# Patient Record
Sex: MALE | Race: WHITE
[De-identification: names, ages, dates, MRNs, and addresses within clinical notes are randomized per-mention and may not be internally consistent; named-entity substitution may affect disease eponyms.]

---

## 2019-02-05 ENCOUNTER — HOSPITAL ENCOUNTER (EMERGENCY)
Dept: HOSPITAL 33 - ED | Age: 74
Discharge: HOME | End: 2019-02-05
Payer: MEDICARE

## 2019-02-05 VITALS — OXYGEN SATURATION: 98 % | SYSTOLIC BLOOD PRESSURE: 132 MMHG | HEART RATE: 67 BPM | DIASTOLIC BLOOD PRESSURE: 67 MMHG

## 2019-02-05 DIAGNOSIS — R55: Primary | ICD-10-CM

## 2019-02-05 DIAGNOSIS — Z79.01: ICD-10-CM

## 2019-02-05 DIAGNOSIS — I25.10: ICD-10-CM

## 2019-02-05 DIAGNOSIS — M79.89: ICD-10-CM

## 2019-02-05 DIAGNOSIS — Z79.899: ICD-10-CM

## 2019-02-05 DIAGNOSIS — E11.9: ICD-10-CM

## 2019-02-05 DIAGNOSIS — F32.9: ICD-10-CM

## 2019-02-05 DIAGNOSIS — I10: ICD-10-CM

## 2019-02-05 DIAGNOSIS — S80.212A: ICD-10-CM
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
## 2019-02-05 DIAGNOSIS — J44.9: ICD-10-CM

## 2019-02-05 LAB
ALBUMIN SERPL-MCNC: 4.3 G/DL (ref 3.5–5)
ALP SERPL-CCNC: 96 U/L (ref 38–126)
ALT SERPL-CCNC: 23 U/L (ref 0–50)
ANION GAP SERPL CALC-SCNC: 15.3 MEQ/L (ref 5–15)
AST SERPL QL: 27 U/L (ref 17–59)
BASOPHILS # BLD AUTO: 0.04 10*3/UL (ref 0–0.4)
BASOPHILS NFR BLD AUTO: 0.5 % (ref 0–0.4)
BILIRUB BLD-MCNC: 0.9 MG/DL (ref 0.2–1.3)
BUN SERPL-MCNC: 12 MG/DL (ref 9–20)
CALCIUM SPEC-MCNC: 9.3 MG/DL (ref 8.4–10.2)
CHLORIDE SERPL-SCNC: 105 MMOL/L (ref 98–107)
CO2 SERPL-SCNC: 26 MMOL/L (ref 22–30)
CREAT SERPL-MCNC: 1.16 MG/DL (ref 0.66–1.25)
EOSINOPHIL # BLD AUTO: 0.33 10*3/UL (ref 0–0.5)
GLUCOSE SERPL-MCNC: 179 MG/DL (ref 74–106)
GRANULOCYTES # BLD AUTO: 4.47 10*3/UL (ref 1.4–6.9)
HCT VFR BLD AUTO: 36.5 % (ref 42–50)
HGB BLD-MCNC: 10.8 GM/DL (ref 12.5–18)
LYMPHOCYTES # SPEC AUTO: 1.64 10*3/UL (ref 1–4.6)
MCH RBC QN AUTO: 24.6 PG (ref 26–32)
MCHC RBC AUTO-ENTMCNC: 29.6 G/DL (ref 32–36)
MONOCYTES # BLD AUTO: 0.86 10*3/UL (ref 0–1.3)
NEUTROPHILS NFR BLD AUTO: 61 % (ref 36–66)
PLATELET # BLD AUTO: 302 K/MM3 (ref 150–450)
POTASSIUM SERPLBLD-SCNC: 3.9 MMOL/L (ref 3.5–5.1)
PROT SERPL-MCNC: 7.3 G/DL (ref 6.3–8.2)
RBC # BLD AUTO: 4.39 M/MM3 (ref 4.1–5.6)
SODIUM SERPL-SCNC: 143 MMOL/L (ref 137–145)
WBC # BLD AUTO: 7.3 K/MM3 (ref 4–10.5)

## 2019-02-05 PROCEDURE — 84484 ASSAY OF TROPONIN QUANT: CPT

## 2019-02-05 PROCEDURE — 80053 COMPREHEN METABOLIC PANEL: CPT

## 2019-02-05 PROCEDURE — 99284 EMERGENCY DEPT VISIT MOD MDM: CPT

## 2019-02-05 PROCEDURE — 96361 HYDRATE IV INFUSION ADD-ON: CPT

## 2019-02-05 PROCEDURE — 96360 HYDRATION IV INFUSION INIT: CPT

## 2019-02-05 PROCEDURE — 85025 COMPLETE CBC W/AUTO DIFF WBC: CPT

## 2019-02-05 PROCEDURE — 93041 RHYTHM ECG TRACING: CPT

## 2019-02-05 PROCEDURE — 36415 COLL VENOUS BLD VENIPUNCTURE: CPT

## 2019-02-05 PROCEDURE — 85379 FIBRIN DEGRADATION QUANT: CPT

## 2019-02-05 PROCEDURE — 93005 ELECTROCARDIOGRAM TRACING: CPT

## 2019-02-05 NOTE — ERPHSYRPT
- History of Present Illness


Time Seen by Provider: 02/05/19 10:25


Source: patient


Exam Limitations: no limitations


Patient Subjective Stated Complaint: pt arrived per ambulance from drs office 

for dizzines after getting up from a chair.he fell to the ground on knees, ems 

had pt walk from cot to bed, and pt denies any dizziness now.


Triage Nursing Assessment: pt alert, resp easy, skin w/d/p. chest clear, slight 

swelling to lower legs. pt has abrasion to left knee from fall. pt denies any 

other injury


Physician History: 





73-year-old white male with a history of syncopal episodes in the past.


Patient arrives with complaint that he was at his doctor's office apparently 

stepped off the scale went to walk into a room felt weak dizzy and felt to his 

hands and knees.


He did not have any problems speaking he states he did not have any loss of 

consciousness he has no chest pain no shortness of breath.


He is not having any focal weaknesses and is moving all of his extremities.  He 

does state that he had a syncopal episode in the distant past where he actually 

needed to be  resuscitated past medical history includes COPD, syncope, high 

blood pressure, diabetes type 2, coronary artery disease, hypertension, 

gallbladder disease, prostate problems, depression





Past surgical history includes left knee surgery, dental extraction, cataracts





Social history patient is a former smoker he denies alcohol or illicit drug use.














Timing/Duration: today


Severity: moderate


Modifying Factors: Improves With: nothing


Associated Symptoms: weakness, other (generalized weakness with near syncopal 

episode), No nausea, No vomiting, No abdominal pain, No shortness of breath, No 

heartburn, No diaphoresis, No cough, No chills, No chest pain, No fever, No 

headaches, No loss of appetite, No malaise, No rash, No syncope, No seizure


Allergies/Adverse Reactions: 








codeine Allergy (Verified 02/05/19 10:21)


 


Sulfa (Sulfonamide Antibiotics) Allergy (Verified 02/05/19 10:21)


 





Home Medications: 








Amlodipine Besylate 5 mg PO DAILY 01/17/16 [History]


Aspirin [Aspir-Low] 81 mg PO DAILY 01/17/16 [History]


Losartan Potassium 100 mg PO DAILY 01/17/16 [History]


Metformin HCl 500 mg*** [Glucophage 500 MG***] 500 mg PO HS 01/17/16 [History]


Metoprolol Succinate 50 mg PO HS 01/17/16 [History]


Multivitamin [Multi-Vitamin Daily] 1 each PO DAILY 01/17/16 [History]


Nabumetone [Relafen] 500 mg PO BID 01/17/16 [History]


Pyridoxine HCl [Vitamin B-6] 100 mg PO DAILY 01/17/16 [History]


Ranitidine HCl 150 mg PO HS 01/17/16 [History]


Senna 8.6 mg*** [Senokot 8.6 mg***] 8.6 mg PO DAILY PRN PRN 01/17/16 [History]


Tamsulosin HCl 0.4 mg*** [Flomax 0.4 MG***] 0.4 mg PO HS 01/17/16 [History]


Vitamin E 400 Units*** [Vitamin E 400 UNIT SOFTGEL***] 400 unit PO DAILY 01/17/ 16 [History]


Omeprazole 40 mg PO DAILY 02/29/16 [History]


Budesonide 0.5 mg/2 ml*** [Pulmicort 0.5 mg/2 ml Respules***] 0.5 ml IH  08/24 /16 [History]


Rivaroxaban [Xarelto] 15 mg PO DAILY 08/24/16 [History]





Hx Tetanus, Diphtheria Vaccination/Date Given: Yes (unknown)


Hx Influenza Vaccination/Date Given: Yes


Hx Pneumococcal Vaccination/Date Given: Yes


Immunizations Up to Date: Yes





- Review of Systems


Constitutional: No Fever, No Chills


Eyes: No Symptoms


Ears, Nose, & Throat: No Symptoms


Respiratory: No Cough, No Dyspnea


Cardiac: Other (generalized weakness with near syncopal episode and fall at 

physician's office), No Chest Pain, No Edema, No Syncope


Abdominal/Gastrointestinal: No Abdominal Pain, No Nausea, No Vomiting, No 

Diarrhea


Genitourinary Symptoms: No Dysuria


Musculoskeletal: Other (Slight pain left anterior knee), No Back Pain, No Neck 

Pain


Skin: Other (abrasion left anterior k)


Neurological: Dizziness, Other (generalized weakness with near syncopal episode)

, No Focal Weakness, No Gait Changes, No Headache, No Irritability, No Lethargy

, No Paralysis, No Parasthesia, No Seizure, No Sensory Changes, No Speech 

Changes, No Tics, No Tremors, No Vertigo


Psychological: No Symptoms


Endocrine: No Symptoms


All Other Systems: Reviewed and Negative





- Past Medical History


Pertinent Past Medical History: Yes


Neurological History: No Pertinent History


ENT History: No Pertinent History


Cardiac History: Coronary Artery Disease, Hypertension, Other


Respiratory History: COPD


Endocrine Medical History: Diabetes Type II


Musculoskeletal History: No Pertinent History


GI Medical History: Gallbladder Disease


 History: No Pertinent History


Psycho-Social History: Depression


Male Reproductive Disorders: Prostate Problems





- Past Surgical History


Past Surgical History: Yes


Neuro Surgical History: No Pertinent History


Cardiac: No Pertinent History


Respiratory: No Pertinent History


Gastrointestinal: Cholecystectomy


Musculoskeletal: Orthopedic Surgery


Male Surgical History: Other


Other Surgical History: dental surgical extractions,left knee feb after fall on 

ice,tear of tendons repaired.





- Social History


Smoking Status: Former smoker


Exposure to second hand smoke: Yes


Alcohol Use: None


Drug Use: none


Patient Lives Alone: No


Significant Family History: diabetes, hypertension





- Nursing Vital Signs


Nursing Vital Signs: 


 Initial Vital Signs











Temperature  97.0 F   02/05/19 10:14


 


Pulse Rate  77   02/05/19 10:14


 


Respiratory Rate  18   02/05/19 10:14


 


Blood Pressure  158/92   02/05/19 10:14


 


O2 Sat by Pulse Oximetry  96   02/05/19 10:14








 Pain Scale











Pain Intensity                 0

















- Physical Exam


General Appearance: no apparent distress, alert


Eye Exam: PERRL/EOMI, eyes nml inspection


Ears, Nose, Throat Exam: normal ENT inspection, TMs normal, pharynx normal, 

moist mucous membranes


Neck Exam: normal inspection, non-tender, supple, full range of motion


Respiratory Exam: normal breath sounds, lungs clear, No respiratory distress


Cardiovascular Exam: regular rate/rhythm, normal heart sounds, normal 

peripheral pulses, capillary refill <2 sec


Gastrointestinal/Abdomen Exam: soft, normal bowel sounds, No tenderness, No mass


Back Exam: normal inspection, normal range of motion, No CVA tenderness, No 

vertebral tenderness


Extremity Exam: normal range of motion, pelvis stable, other (slight edema left 

anterior knee,abrasion left anterior knee), No amputations, No burns, No 

contusions, No calf tenderness, No deformities, No lacerations, No penetrations

, No parasthesia, No paralysis, No shagufta's sign, No inflammation, No joint 

swelling, No limited range of motion


Neurologic Exam: alert, oriented x 3, cooperative, CNs II-XII nml as tested, 

normal mood/affect, nml cerebellar function, nml station & gait, sensation nml, 

other (patient alert, oriented 3, cranial nerves II through XII intact, speech 

normal, no facial droop, normal finger to nose, no pronator drift,  equal 

and symmetrical 5/5, full range of motion all extremities, sensation intact to 

all extremities GCS equals 15), No motor deficits, No sensory deficit, No 

disoriented, No confusion, No agitation, No uncooperative, No intoxicated 

appearance, No depressed mood/affect, No motor weakness, No facial droop, No 

slurred speech, No aphasia, No dysarthria, No abnormal gait, No abnormal 

cerebellar tests, No abnormal CNs II-XII, No EOM palsy


Skin Exam: other (abrasion  left anterior knee)


Lymphatic Exam: adenopathy


**SpO2 Interpretation**: normal (96%)


SpO2: 96





- Course


Nursing assessment & vital signs reviewed: Yes


EKG Interpreted by Me: RATE (65 bpm), Sinus Rhythm, NORMAL AXIS, Other (EKG: 

Sinus rhythm with PVC, 73 bpm, normal axis, complete right bundle block., No 

acute ST or T wave changes noted. Compared to February 29, 2016)


Ordered Tests: 


 Active Orders 24 hr











 Category Date Time Status


 


 Cardiac Monitor STAT Care  02/05/19 10:33 Active


 


 EKG-ER Only STAT Care  02/05/19 10:32 Active


 


 IV Insertion STAT Care  02/05/19 10:32 Active


 


 Orthostatic Vital Signs STAT Care  02/05/19 10:33 Active


 


 Pulse Oximetry (ED) STAT Care  02/05/19 10:32 Active


 


 Wound Care STAT Care  02/05/19 10:33 Active


 


 CBC W DIFF Stat Lab  02/05/19 11:00 Completed


 


 CMP Stat Lab  02/05/19 11:00 Completed


 


 D-DIMER QUANTITATION Stat Lab  02/05/19 11:00 Completed


 


 TROPONIN Q3H Lab  02/05/19 11:00 Completed


 


 TROPONIN Q3H Lab  02/05/19 13:50 Completed


 


 TROPONIN Q3H Lab  02/05/19 16:45 Ordered


 


 TROPONIN Q3H Lab  02/05/19 19:45 Ordered


 


 TROPONIN Q3H Lab  02/05/19 22:45 Ordered








Medication Summary











Generic Name Dose Route Start Last Admin





  Trade Name Freq  PRN Reason Stop Dose Admin


 


Sodium Chloride  1,000 mls @ 100 mls/hr  02/05/19 10:45  02/05/19 10:42





  Sodium Chloride 0.9% 1000 Ml  IV  03/07/19 10:44  100 mls/hr





  .Q10H JOYCELYN   Administration





     





     





     





     














Discontinued Medications














Generic Name Dose Route Start Last Admin





  Trade Name Cyndi  PRN Reason Stop Dose Admin


 


Bacitracin Zinc  0.9 gm  02/05/19 10:33  02/05/19 10:42





  Baciguent Packet***  TP  02/05/19 10:34  1 gm





  STAT ONE   Administration





     





     





     





     


 


Bacitracin Zinc  Confirm  02/05/19 10:40  





  Baciguent Packet***  Administered  02/05/19 10:41  





  Dose   





  1 gm   





  .ROUTE   





  .[place]   





     





     





     





     











Lab/Rad Data: 


 Laboratory Result Diagrams





 02/05/19 11:00 





 02/05/19 11:00 





 Laboratory Results











  02/05/19 02/05/19 02/05/19 Range/Units





  13:50 11:00 11:00 


 


WBC     (4.0-10.5)  K/mm3


 


RBC     (4.1-5.6)  M/mm3


 


Hgb     (12.5-18.0)  gm/dl


 


Hct     (42-50)  %


 


MCV     ()  fl


 


MCH     (26-32)  pg


 


MCHC     (32-36)  g/dl


 


RDW     (11.5-14.0)  %


 


Plt Count     (150-450)  K/mm3


 


MPV     (6-9.5)  fl


 


Gran %     (36.0-66.0)  %


 


Eos # (Auto)     (0-0.5)  


 


Absolute Lymphs (auto)     (1.0-4.6)  


 


Absolute Monos (auto)     (0.0-1.3)  


 


Lymphocytes %     (24.0-44.0)  %


 


Monocytes %     (0.0-12.0)  %


 


Eosinophils %     (0.00-5.0)  %


 


Basophils %     (0.0-0.4)  %


 


Absolute Granulocytes     (1.4-6.9)  


 


Basophils #     (0-0.4)  


 


D-Dimer    392  (215-500)  ng/mL


 


Sodium     (137-145)  mmol/L


 


Potassium     (3.5-5.1)  mmol/L


 


Chloride     ()  mmol/L


 


Carbon Dioxide     (22-30)  mmol/L


 


Anion Gap     (5-15)  MEQ/L


 


BUN     (9-20)  mg/dL


 


Creatinine     (0.66-1.25)  mg/dL


 


Estimated GFR     ML/MIN


 


Glucose     ()  mg/dL


 


Calcium     (8.4-10.2)  mg/dL


 


Total Bilirubin     (0.2-1.3)  mg/dL


 


AST     (17-59)  U/L


 


ALT     (0-50)  U/L


 


Alkaline Phosphatase     ()  U/L


 


Troponin I  < 0.012  < 0.012   (0.000-0.034)  ng/mL


 


Serum Total Protein     (6.3-8.2)  g/dL


 


Albumin     (3.5-5.0)  g/dL














  02/05/19 02/05/19 Range/Units





  11:00 11:00 


 


WBC   7.3  (4.0-10.5)  K/mm3


 


RBC   4.39  (4.1-5.6)  M/mm3


 


Hgb   10.8 L  (12.5-18.0)  gm/dl


 


Hct   36.5 L  (42-50)  %


 


MCV   83.1  ()  fl


 


MCH   24.6 L  (26-32)  pg


 


MCHC   29.6 L  (32-36)  g/dl


 


RDW   16.2 H  (11.5-14.0)  %


 


Plt Count   302  (150-450)  K/mm3


 


MPV   11.6 H  (6-9.5)  fl


 


Gran %   61.0  (36.0-66.0)  %


 


Eos # (Auto)   0.33  (0-0.5)  


 


Absolute Lymphs (auto)   1.64  (1.0-4.6)  


 


Absolute Monos (auto)   0.86  (0.0-1.3)  


 


Lymphocytes %   22.3 L  (24.0-44.0)  %


 


Monocytes %   11.7  (0.0-12.0)  %


 


Eosinophils %   4.5  (0.00-5.0)  %


 


Basophils %   0.5  (0.0-0.4)  %


 


Absolute Granulocytes   4.47  (1.4-6.9)  


 


Basophils #   0.04  (0-0.4)  


 


D-Dimer    (215-500)  ng/mL


 


Sodium  143   (137-145)  mmol/L


 


Potassium  3.9   (3.5-5.1)  mmol/L


 


Chloride  105   ()  mmol/L


 


Carbon Dioxide  26   (22-30)  mmol/L


 


Anion Gap  15.3 H   (5-15)  MEQ/L


 


BUN  12   (9-20)  mg/dL


 


Creatinine  1.16   (0.66-1.25)  mg/dL


 


Estimated GFR  > 60.0   ML/MIN


 


Glucose  179 H   ()  mg/dL


 


Calcium  9.3   (8.4-10.2)  mg/dL


 


Total Bilirubin  0.90   (0.2-1.3)  mg/dL


 


AST  27   (17-59)  U/L


 


ALT  23   (0-50)  U/L


 


Alkaline Phosphatase  96   ()  U/L


 


Troponin I    (0.000-0.034)  ng/mL


 


Serum Total Protein  7.3   (6.3-8.2)  g/dL


 


Albumin  4.3   (3.5-5.0)  g/dL














- Progress


Progress: improved


Progress Note: 





02/05/19 12:39


73-year-old white male arrives with complaint of a near syncopal episode at his 

physician's office today.


Patient apparently had stepped off the scale of walked into the other room and 

then felt "numb all over".


States he fell to his knees did not pass out did not hit his head no chest pain 

no shortness of breath.


Patient to with the abrasion to the left anterior knee.


Patient in no acute distress vitals are stable.


CBC CMP troponin and d-dimer are all normal EKG remarkable for sinus rhythm 

with PVC right bundle branch block no acute ST or T wave changes are noted


Orthostatic vital signs are stable.


I've discussed the patient's case with Dr. Chapman.


He would like to repeat troponins 3 hours after last draw.. Consider discharge 

if within normal limits however patient is to follow-up with Dr. Chapman 

tomorrow in the [place] office at 10:30 AM.


02/05/19 15:03


Patient's repeat troponin within normal limits.


Patient's vitals have been stable.


Patient in no distress.


Will discharge.





- Departure


Time of Disposition: 15:04


Departure Disposition: Home


Clinical Impression: 


 near syncopal episode, Abrasion, left knee, initial encounter





Condition: Fair


Critical Care Time: No


Referrals: 


RAJANI CHAPMAN MD [Primary Care Provider] - 


Additional Instructions: 


Return home rest.


No driving, no heights no hazardous activity.


Take showers instead of baths.


Do not engage in any activity that might harming yourself or others.


Follow-up with Dr. Chapman tomorrow morning at 10:30 AM.([place] office)


Return for acute distress severe symptoms or for any problems.

## 2020-06-15 ENCOUNTER — HOSPITAL ENCOUNTER (EMERGENCY)
Dept: HOSPITAL 33 - ED | Age: 75
Discharge: HOME | End: 2020-06-15
Payer: MEDICARE

## 2020-06-15 VITALS — SYSTOLIC BLOOD PRESSURE: 218 MMHG | DIASTOLIC BLOOD PRESSURE: 74 MMHG | HEART RATE: 60 BPM | OXYGEN SATURATION: 97 %

## 2020-06-15 DIAGNOSIS — J44.9: ICD-10-CM
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
## 2020-06-15 DIAGNOSIS — S61.211A: Primary | ICD-10-CM

## 2020-06-15 DIAGNOSIS — E11.9: ICD-10-CM

## 2020-06-15 DIAGNOSIS — I25.10: ICD-10-CM

## 2020-06-15 DIAGNOSIS — Z79.899: ICD-10-CM
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
## 2020-06-15 DIAGNOSIS — Z79.82: ICD-10-CM

## 2020-06-15 DIAGNOSIS — Z79.01: ICD-10-CM

## 2020-06-15 DIAGNOSIS — I10: ICD-10-CM

## 2020-06-15 PROCEDURE — 99284 EMERGENCY DEPT VISIT MOD MDM: CPT

## 2020-06-15 PROCEDURE — 12001 RPR S/N/AX/GEN/TRNK 2.5CM/<: CPT

## 2020-06-15 PROCEDURE — 96372 THER/PROPH/DIAG INJ SC/IM: CPT

## 2020-06-15 PROCEDURE — 90471 IMMUNIZATION ADMIN: CPT

## 2020-06-15 PROCEDURE — 90715 TDAP VACCINE 7 YRS/> IM: CPT

## 2020-06-15 NOTE — ERPHSYRPT
- History of Present Illness


Time Seen by Provider: 06/15/20 10:40


Source: patient


Exam Limitations: no limitations


Patient Subjective Stated Complaint: pt to ER with complaints of L 2nd digit 

laceration this morning from  knife. pt states he is on blood thinners.


Triage Nursing Assessment: pt with laceration to L 2nd digit. bleeding 

controlled. pt on blood thinners. pt A&Ox4.


Physician History: 





Is a 74-year-old male who was trying to on block a obstruction from in a 

icemaker with a  knife when it slipped and cut his right index finger.  

He is on Eliquis.  The laceration is at the distal phalanx radial aspect 2 cm 

in length.  No other injury this occurred just prior to arrival by private 

vehicle.


Timing/Duration: today


Quality: painful


Severity: moderate


Location: hands (Next finger)


Associated Symptoms: denies symptoms


Allergies/Adverse Reactions: 








codeine Allergy (Verified 06/15/20 10:34)


 


Sulfa (Sulfonamide Antibiotics) Allergy (Verified 06/15/20 10:34)


 





Home Medications: 








Amlodipine Besylate 5 mg PO DAILY 01/17/16 [History]


Aspirin [Aspir-Low] 81 mg PO DAILY 01/17/16 [History]


Losartan Potassium 100 mg PO DAILY 01/17/16 [History]


Metformin HCl 500 mg*** [Glucophage 500 MG***] 500 mg PO HS 01/17/16 [History]


Metoprolol Succinate 50 mg PO HS 01/17/16 [History]


Multivitamin [Multi-Vitamin Daily] 1 each PO DAILY 01/17/16 [History]


Nabumetone [Relafen] 500 mg PO BID 01/17/16 [History]


Pyridoxine HCl [Vitamin B-6] 100 mg PO DAILY 01/17/16 [History]


Ranitidine HCl 150 mg PO HS 01/17/16 [History]


Senna 8.6 mg*** [Senokot 8.6 mg***] 8.6 mg PO DAILY PRN PRN 01/17/16 [History]


Tamsulosin HCl 0.4 mg*** [Flomax 0.4 MG***] 0.4 mg PO HS 01/17/16 [History]


Vitamin E 400 Units*** [Vitamin E 400 UNIT SOFTGEL***] 400 unit PO DAILY 01/17/ 16 [History]


Omeprazole 40 mg PO DAILY 02/29/16 [History]


Budesonide 0.5 mg/2 ml*** [Pulmicort 0.5 mg/2 ml Respules***] 0.5 ml IH UD 08/24 /16 [History]


Rivaroxaban [Xarelto] 15 mg PO DAILY 08/24/16 [History]





Hx Tetanus, Diphtheria Vaccination/Date Given: No


Hx Influenza Vaccination/Date Given: Yes


Hx Pneumococcal Vaccination/Date Given: Yes


Immunizations Up to Date: Yes





Travel Risk





- International Travel


Have you traveled outside of the country in past 3 weeks: No (N)


If Yes, where;: N





- Coronavirus Screening


Close contact with a COVID-19 positive Pt in past 14-21 Days: No





- Review of Systems


Constitutional: No Fever, No Chills


Eyes: No Symptoms


Ears, Nose, & Throat: No Symptoms


Respiratory: No Cough, No Dyspnea


Cardiac: No Chest Pain, No Edema, No Syncope


Abdominal/Gastrointestinal: No Abdominal Pain, No Nausea, No Vomiting, No 

Diarrhea


Genitourinary Symptoms: No Dysuria


Musculoskeletal: No Back Pain, No Neck Pain


Skin: No Rash


Neurological: No Dizziness, No Focal Weakness, No Sensory Changes


Psychological: No Symptoms


Endocrine: No Symptoms


All Other Systems: Reviewed and Negative





- Past Medical History


Pertinent Past Medical History: Yes


Neurological History: No Pertinent History


ENT History: No Pertinent History


Cardiac History: Coronary Artery Disease, Hypertension, Other


Respiratory History: COPD


Endocrine Medical History: Diabetes Type II


Musculoskeletal History: No Pertinent History


GI Medical History: Gallbladder Disease


 History: No Pertinent History


Psycho-Social History: Depression


Male Reproductive Disorders: Prostate Problems





- Past Surgical History


Past Surgical History: Yes


Neuro Surgical History: No Pertinent History


Cardiac: No Pertinent History


Respiratory: No Pertinent History


Gastrointestinal: Cholecystectomy


Musculoskeletal: Orthopedic Surgery


Male Surgical History: Other


Other Surgical History: dental surgical extractions,left knee feb after fall on 

ice,tear of tendons repaired.





- Social History


Smoking Status: Former smoker


Exposure to second hand smoke: No


Alcohol Use: None


Drug Use: none


Patient Lives Alone: No


Significant Family History: diabetes, hypertension





- Nursing Vital Signs


Nursing Vital Signs: 





 Initial Vital Signs











Temperature  97.4 F   06/15/20 10:26


 


Pulse Rate  68   06/15/20 10:26


 


Respiratory Rate  16   06/15/20 10:26


 


Blood Pressure  150/81   06/15/20 10:26


 


O2 Sat by Pulse Oximetry  98   06/15/20 10:26








 Pain Scale











Pain Intensity                 2

















- Physical Exam


General Appearance: no apparent distress, alert


Eye Exam: PERRL/EOMI, eyes nml inspection


Ears, Nose, Throat Exam: normal ENT inspection, pharynx normal, moist mucous 

membranes


Neck Exam: normal inspection, non-tender, supple, full range of motion


Extremity Exam: lacerations, limited range of motion, other (Extremity 

examination shows normal inspection normal range of motion normal neurovascular 

tendon function the only abnormality is a 2 cm laceration distal phalanx left 

index finger radial aspect.)


SpO2: 98


Ordered Tests: 





Medication Summary














Discontinued Medications














Generic Name Dose Route Start Last Admin





  Trade Name Freq  PRN Reason Stop Dose Admin


 


Diphtheria/Tetanus/Acell Pertussis  0.5 ml  06/15/20 10:29  





  Adacel Vial***  IM  06/15/20 10:30  





  .ONCE ONE   





     





     





     





     


 


Diphtheria/Tetanus/Acell Pertussis  Confirm  06/15/20 10:45  





  Adacel Vial***  Administered  06/15/20 10:46  





  Dose   





  0.5 ml   





  IM   





  .STK-MED ONE   





     





     





     





     


 


Lidocaine HCl  5 ml  06/15/20 10:29  





  Xylocaine 1% Hcl 20 Ml Mdv***  IJ  06/15/20 10:30  





  STAT ONE   





     





     





     





     


 


Lidocaine HCl  Confirm  06/15/20 10:32  





  Xylocaine 1% Hcl 20 Ml Mdv***  Administered  06/15/20 10:33  





  Dose   





  5 ml   





  .ROUTE   





  .STK-MED ONE   





     





     





     





     














- Progress


Progress: improved





- Departure


Departure Disposition: Home


Clinical Impression: 


 Laceration





Condition: Stable


Critical Care Time: No


Referrals: 


RAJANI CHAPMAN MD [Primary Care Provider] - 


Instructions:  Laceration Repair With Stitches (DC)

## 2020-08-23 ENCOUNTER — HOSPITAL ENCOUNTER (EMERGENCY)
Dept: HOSPITAL 33 - ED | Age: 75
Discharge: HOME | End: 2020-08-23
Payer: MEDICARE

## 2020-08-23 VITALS — OXYGEN SATURATION: 98 %

## 2020-08-23 VITALS — HEART RATE: 72 BPM | DIASTOLIC BLOOD PRESSURE: 76 MMHG | SYSTOLIC BLOOD PRESSURE: 130 MMHG

## 2020-08-23 DIAGNOSIS — S00.412A: ICD-10-CM

## 2020-08-23 DIAGNOSIS — S40.012A: ICD-10-CM

## 2020-08-23 DIAGNOSIS — S00.432A: ICD-10-CM

## 2020-08-23 DIAGNOSIS — Z79.899: ICD-10-CM

## 2020-08-23 DIAGNOSIS — S09.90XA: Primary | ICD-10-CM

## 2020-08-23 DIAGNOSIS — I25.10: ICD-10-CM

## 2020-08-23 DIAGNOSIS — R55: ICD-10-CM

## 2020-08-23 DIAGNOSIS — J44.9: ICD-10-CM

## 2020-08-23 DIAGNOSIS — I10: ICD-10-CM

## 2020-08-23 DIAGNOSIS — E11.9: ICD-10-CM

## 2020-08-23 LAB
ALBUMIN SERPL-MCNC: 4.4 G/DL (ref 3.5–5)
ALP SERPL-CCNC: 61 U/L (ref 38–126)
ALT SERPL-CCNC: 20 U/L (ref 0–50)
ANION GAP SERPL CALC-SCNC: 13.9 MEQ/L (ref 5–15)
AST SERPL QL: 27 U/L (ref 17–59)
BASOPHILS # BLD AUTO: 0.03 10*3/UL (ref 0–0.4)
BASOPHILS NFR BLD AUTO: 0.4 % (ref 0–0.4)
BILIRUB BLD-MCNC: 1.5 MG/DL (ref 0.2–1.3)
BUN SERPL-MCNC: 15 MG/DL (ref 9–20)
CALCIUM SPEC-MCNC: 9.1 MG/DL (ref 8.4–10.2)
CHLORIDE SERPL-SCNC: 104 MMOL/L (ref 98–107)
CO2 SERPL-SCNC: 26 MMOL/L (ref 22–30)
CREAT SERPL-MCNC: 1.3 MG/DL (ref 0.66–1.25)
EOSINOPHIL # BLD AUTO: 0.2 10*3/UL (ref 0–0.5)
GLUCOSE SERPL-MCNC: 185 MG/DL (ref 74–106)
GLUCOSE UR-MCNC: >=500 MG/DL
HCT VFR BLD AUTO: 40.1 % (ref 42–50)
HGB BLD-MCNC: 12.1 GM/DL (ref 12.5–18)
LIPASE SERPL-CCNC: 144 U/L (ref 23–300)
LYMPHOCYTES # SPEC AUTO: 1.1 10*3/UL (ref 1–4.6)
MCH RBC QN AUTO: 28.5 PG (ref 26–32)
MCHC RBC AUTO-ENTMCNC: 30.2 G/DL (ref 32–36)
MONOCYTES # BLD AUTO: 0.73 10*3/UL (ref 0–1.3)
PLATELET # BLD AUTO: 235 K/MM3 (ref 150–450)
POTASSIUM SERPLBLD-SCNC: 4 MMOL/L (ref 3.5–5.1)
PROT SERPL-MCNC: 7 G/DL (ref 6.3–8.2)
PROT UR STRIP-MCNC: NEGATIVE MG/DL
RBC # BLD AUTO: 4.25 M/MM3 (ref 4.1–5.6)
RBC #/AREA URNS HPF: (no result) /HPF (ref 0–2)
SODIUM SERPL-SCNC: 140 MMOL/L (ref 137–145)
WBC # BLD AUTO: 7 K/MM3 (ref 4–10.5)
WBC #/AREA URNS HPF: (no result) /HPF (ref 0–5)

## 2020-08-23 PROCEDURE — 85025 COMPLETE CBC W/AUTO DIFF WBC: CPT

## 2020-08-23 PROCEDURE — 80076 HEPATIC FUNCTION PANEL: CPT

## 2020-08-23 PROCEDURE — 81001 URINALYSIS AUTO W/SCOPE: CPT

## 2020-08-23 PROCEDURE — 84484 ASSAY OF TROPONIN QUANT: CPT

## 2020-08-23 PROCEDURE — 83690 ASSAY OF LIPASE: CPT

## 2020-08-23 PROCEDURE — 36000 PLACE NEEDLE IN VEIN: CPT

## 2020-08-23 PROCEDURE — 70450 CT HEAD/BRAIN W/O DYE: CPT

## 2020-08-23 PROCEDURE — 36415 COLL VENOUS BLD VENIPUNCTURE: CPT

## 2020-08-23 PROCEDURE — 93005 ELECTROCARDIOGRAM TRACING: CPT

## 2020-08-23 PROCEDURE — 99284 EMERGENCY DEPT VISIT MOD MDM: CPT

## 2020-08-23 PROCEDURE — 80048 BASIC METABOLIC PNL TOTAL CA: CPT

## 2020-08-23 PROCEDURE — 73030 X-RAY EXAM OF SHOULDER: CPT

## 2020-08-23 NOTE — XRAY
Indication: Headache following fall.  Current blood thinner therapy.



Multiple contiguous axial images obtained through the head without contrast.



Comparison: None



Age-appropriate global atrophy and minimal periventricular degenerative

micro-ischemia bilaterally.  No acute intracranial hemorrhage, abnormal

extra-axial fluid collection, or mass effect.  Fourth ventricle is midline.

Bony calvarium intact.  Remaining visualized paranasal sinuses and mastoid air

cells are clear.



Impression: Nonacute senile brain.  Minimal paranasal sinus disease.



Comment: Preliminary interpretation was made by [facility].  No critical discrepancy.

## 2020-08-23 NOTE — XRAY
Pain following fall.



Comparison: None



3 view left shoulder demonstrates mild osteopenia and moderate degenerative

arthropathy of the AC and glenohumeral joints.  Incompletely visualized left

pacemaker and small left hilar calcified nodes.  Remaining shoulder

unremarkable.

## 2020-08-23 NOTE — ERPHSYRPT
- History of Present Illness


Time Seen by Provider: 08/23/20 11:21


Historian: patient


Exam Limitations: no limitations


Physician History: 





The patient is a 74-year-old male with a past medical history significant for 

CAD, atrial fibrillation who currently takes apixaban for anticoagulation 

presents with a chief complaint of a headache and persistent nausea in addition 

to a left shoulder injury that started last Tuesday after he had a syncopal 

episode.


The patient reportedly was working outside and moving a swing set when he 

suddenly felt dizzy and felt as if he was going to pass out before losing 

consciousness and impacting the ground.


Patient states that he lost control of his bowels during the syncopal episode 

but as soon as he hit the ground he immediately awoke and got up and continue to

work.


He denies chest pain, shortness of breath but reportedly over the ongoing week 

he is continued to feel nauseous in addition to having a dull headache that is 

diffuse nonradiating and constant.


He endorsed obtaining some bruising and a superficial abrasion to the left ear 

and a abrasion to the left shoulder as a result of falling.


He denies vomiting, fever, chills, chest pain, palpitations, shortness of 

breath, neck pain and has been able to walk without difficulty since that time.


The reason he comes to the emergency department today was due to his daughter 

concerned of his ongoing headache and nausea with a recently reported head 

injury in the context of taking apixaban and was concerned that he might have 

some "bleeding" in his brain.


Has not taken anything for his symptoms prior to arrival.


He was offered Tylenol for his headache in addition to antiemetics but declined.


Allergies/Adverse Reactions: 








Sulfa (Sulfonamide Antibiotics) Allergy (Severe, Verified 08/23/20 11:30)


   


   hallucinations 


codeine Allergy (Intermediate, Verified 08/23/20 11:30)


   Nausea and Vomiting





Home Medications: 








Aspirin [Aspir-Low] 81 mg PO DAILY 01/17/16 [History]


Losartan Potassium 100 mg PO DAILY 01/17/16 [History]


Metformin HCl 500 mg*** [Glucophage 500 MG***] 500 mg PO HS 01/17/16 [History]


Metoprolol Succinate 50 mg PO HS 01/17/16 [History]


Multivitamin [Multi-Vitamin Daily] 1 each PO DAILY 01/17/16 [History]


Nabumetone [Relafen] 500 mg PO BID 01/17/16 [History]


Pyridoxine HCl [Vitamin B-6] 100 mg PO DAILY 01/17/16 [History]


Senna 8.6 mg*** [Senokot 8.6 mg***] 8.6 mg PO DAILY PRN PRN 01/17/16 [History]


Tamsulosin HCl 0.4 mg*** [Flomax 0.4 MG***] 0.4 mg PO HS 01/17/16 [History]


Vitamin E 400 Units*** [Vitamin E 400 UNIT SOFTGEL***] 400 unit PO DAILY 

01/17/16 [History]


Omeprazole 40 mg PO DAILY 02/29/16 [History]


Budesonide 0.5 mg/2 ml*** [Pulmicort 0.5 mg/2 ml Respules***] 0.5 ml IH UD 

08/24/16 [History]


Apixaban [Eliquis] 5 mg PO BID 06/26/20 [History]


Famotidine 20 mg*** [Pepcid 20 MG***] 20 mg PO DAILY 07/03/20 [History]





Hx Tetanus, Diphtheria Vaccination/Date Given: No


Hx Influenza Vaccination/Date Given: Yes


Hx Pneumococcal Vaccination/Date Given: Yes





- Review of Systems


Constitutional: No Symptoms


Eyes: No Symptoms


Ears, Nose, & Throat: Other (Abrasion and contusion to left ear)


Respiratory: No Symptoms


Cardiac: Syncope


Abdominal/Gastrointestinal: Nausea, No Abdominal Pain, No Vomiting, No Diarrhea,

 No Constipation


Genitourinary Symptoms: No Symptoms


Musculoskeletal: Fall, Other (Left shoulder pain)


Neurological: Headache, No Focal Weakness, No Gait Changes, No Seizure, No 

Sensory Changes, No Speech Changes, No Vertigo


Psychological: No Symptoms


Endocrine: No Symptoms


Hematologic/Lymphatic: Anemia


All Other Systems: Reviewed and Negative





- Past Medical History


Pertinent Past Medical History: Yes


Neurological History: No Pertinent History


ENT History: No Pertinent History


Cardiac History: Arrhythmia, Coronary Artery Disease, Hypertension, Other


Respiratory History: COPD


Endocrine Medical History: Diabetes Type II


Musculoskeletal History: No Pertinent History


GI Medical History: Gallbladder Disease


 History: No Pertinent History


Psycho-Social History: Depression


Male Reproductive Disorders: Prostate Problems


Other Medical History: a fib, pacer/defib





- Past Surgical History


Past Surgical History: Yes


Neuro Surgical History: No Pertinent History


Cardiac: No Pertinent History


Respiratory: No Pertinent History


Gastrointestinal: Cholecystectomy


Genitourinary: No Pertinent History


Musculoskeletal: Orthopedic Surgery


Male Surgical History: Other


Other Surgical History: dental surgical extractions,left knee feb after fall on 

ice,tear of tendons repaired.





- Social History


Smoking Status: Former smoker


How long have you smoked: 42 years


Exposure to second hand smoke: No


Alcohol Use: None


Drug Use: none


Patient Lives Alone: No


Significant Family History: diabetes, hypertension





- Nursing Vital Signs


Nursing Vital Signs: 


                               Initial Vital Signs











Temperature  98.1 F   08/23/20 11:19


 


Pulse Rate  69   08/23/20 11:19


 


Respiratory Rate  18   08/23/20 11:19


 


O2 Sat by Pulse Oximetry  98   08/23/20 11:19








                                   Pain Scale











Pain Intensity                 0

















- Physical Exam


General Appearance: no apparent distress, alert, obese


Eye Exam: PERRL/EOMI, eyes nml inspection, No scleral icterus, No pale 

conjunctivae, No photophobia, No EOM palsy/anisocoria


Ears, Nose, Throat Exam: normal ENT inspection, TMs normal, pharynx normal, 

moist mucous membranes, pharyngeal erythema, other (Superficial abrasion and 

contusion noted to the left external ear that appeared to be healing with no 

evidence of cauliflower ear)


Neck Exam: normal inspection, non-tender, supple, other (No midline cervical 

spine tenderness, crepitus, or step-off)


Respiratory Exam: normal breath sounds, lungs clear, airway intact, No chest 

tenderness, No respiratory distress, No accessory muscle use


Cardiovascular Exam: regular rate/rhythm, normal peripheral pulses, capillary 

refill <2 sec, other (Pacemaker noted to the anterior left chest wall), No 

murmur, No friction rub, No gallop


Gastrointestinal/Abdomen Exam: soft, No tenderness, No mass, No guarding


Rectal Exam: deferred


Back Exam: normal inspection, No CVA tenderness, No vertebral tenderness, No 

point tenderness


Extremity Exam: tenderness, other (Contusion and tenderness noted to the left 

shoulder), No deformities


Neurologic Exam: alert, oriented x 3, cooperative, other (GCS 15)


Skin Exam: warm, dry, other (Contusion/ecchymosis noted to the left shouled), No

 petechiae, No jaundice





- Course


Nursing assessment & vital signs reviewed: Yes


EKG Interpreted by Me: RATE, Other (Ventricular-paced complexes, Vent rate 64 

bpm, WV interval 209 ms, QRS duration 152 ms, QT/QTc 505/521 ms)





- Radiology Exams


  ** Shoulder


X-ray Interpretation: Interpreted by me, Reviewed by me, Negative





- CT Exams


  ** Head


CT Interpretation: Negative, Tele-radiologist Report, Other (No acute 

intracranial process)


Ordered Tests: 


                               Active Orders 24 hr











 Category Date Time Status


 


 EKG-ER Only STAT Care  08/23/20 11:21 Completed


 


 IV Insertion STAT Care  08/23/20 11:21 Completed


 


 HEAD WITHOUT CONTRAST [CT] Stat Exams  08/23/20 12:28 Completed


 


 SHOULDER Stat Exams  08/23/20 12:44 Completed


 


 BMP Stat Lab  08/23/20 12:00 Completed


 


 CBC W DIFF Stat Lab  08/23/20 12:00 Completed


 


 Hepatic Function Panel Stat Lab  08/23/20 12:00 Completed


 


 LIPASE Stat Lab  08/23/20 12:00 Completed


 


 TROPONIN Q3H Lab  08/23/20 12:00 Completed


 


 UA W/RFX UR CULTURE Stat Lab  08/23/20 13:36 Completed











Lab/Rad Data: 


                           Laboratory Result Diagrams





                                 08/23/20 12:00 





                                 08/23/20 12:00 





                               Laboratory Results











  08/23/20 08/23/20 08/23/20 Range/Units





  13:36 12:00 12:00 


 


WBC     (4.0-10.5)  K/mm3


 


RBC     (4.1-5.6)  M/mm3


 


Hgb     (12.5-18.0)  gm/dl


 


Hct     (42-50)  %


 


MCV     ()  fl


 


MCH     (26-32)  pg


 


MCHC     (32-36)  g/dl


 


RDW     (11.5-14.0)  %


 


Plt Count     (150-450)  K/mm3


 


MPV     (7.5-11.0)  fl


 


Gran %     (36.0-66.0)  %


 


Eos # (Auto)     (0-0.5)  


 


Absolute Lymphs (auto)     (1.0-4.6)  


 


Absolute Monos (auto)     (0.0-1.3)  


 


Lymphocytes %     (24.0-44.0)  %


 


Monocytes %     (0.0-12.0)  %


 


Eosinophils %     (0.00-5.0)  %


 


Basophils %     (0.0-0.4)  %


 


Absolute Granulocytes     (1.4-6.9)  


 


Basophils #     (0-0.4)  


 


Sodium    140  (137-145)  mmol/L


 


Potassium    4.0  (3.5-5.1)  mmol/L


 


Chloride    104  ()  mmol/L


 


Carbon Dioxide    26  (22-30)  mmol/L


 


Anion Gap    13.9  (5-15)  MEQ/L


 


BUN    15  (9-20)  mg/dL


 


Creatinine    1.30 H  (0.66-1.25)  mg/dL


 


Estimated GFR    57.4  ML/MIN


 


Glucose    185 H  ()  mg/dL


 


Calcium    9.1  (8.4-10.2)  mg/dL


 


Total Bilirubin    1.50 H  (0.2-1.3)  mg/dL


 


Direct Bilirubin    0.1  (0.0-0.4)  mg/dL


 


AST    27  (17-59)  U/L


 


ALT    20  (0-50)  U/L


 


Alkaline Phosphatase    61  ()  U/L


 


Troponin I   < 0.012   (0.000-0.034)  ng/mL


 


Serum Total Protein    7.0  (6.3-8.2)  g/dL


 


Albumin    4.4  (3.5-5.0)  g/dL


 


Lipase    144  ()  U/L


 


Urine Color  [place]    (YELLOW)  


 


Urine Appearance  SLIGHTLY CLOUDY    (CLEAR)  


 


Urine pH  6.0    (5-6)  


 


Ur Specific Gravity  1.013    (1.005-1.025)  


 


Urine Protein  NEGATIVE    (Negative)  


 


Urine Ketones  NEGATIVE    (NEGATIVE)  


 


Urine Blood  NEGATIVE    (0-5)  Jose F/ul


 


Urine Nitrite  NEGATIVE    (NEGATIVE)  


 


Urine Bilirubin  NEGATIVE    (NEGATIVE)  


 


Urine Urobilinogen  NEGATIVE    (0-1)  mg/dL


 


Ur Leukocyte Esterase  NEGATIVE    (NEGATIVE)  


 


Urine WBC (Auto)  NONE    (0-5)  /HPF


 


Urine RBC (Auto)  NONE    (0-2)  /HPF


 


U Epithel Cells (Auto)  NONE    (FEW)  /HPF


 


Urine Bacteria (Auto)  NONE    (NEGATIVE)  /HPF


 


Urine Mucus (Auto)  SLIGHT    (NEGATIVE)  /HPF


 


Urine Culture Reflexed  NO    (NO)  


 


Urine Glucose  >=500    (NEGATIVE)  mg/dL














  08/23/20 Range/Units





  12:00 


 


WBC  7.0  (4.0-10.5)  K/mm3


 


RBC  4.25  (4.1-5.6)  M/mm3


 


Hgb  12.1 L  (12.5-18.0)  gm/dl


 


Hct  40.1 L  (42-50)  %


 


MCV  94.4  ()  fl


 


MCH  28.5  (26-32)  pg


 


MCHC  30.2 L  (32-36)  g/dl


 


RDW  15.7 H  (11.5-14.0)  %


 


Plt Count  235  (150-450)  K/mm3


 


MPV  11.9 H  (7.5-11.0)  fl


 


Gran %  70.6 H  (36.0-66.0)  %


 


Eos # (Auto)  0.20  (0-0.5)  


 


Absolute Lymphs (auto)  1.10  (1.0-4.6)  


 


Absolute Monos (auto)  0.73  (0.0-1.3)  


 


Lymphocytes %  15.7 L  (24.0-44.0)  %


 


Monocytes %  10.4  (0.0-12.0)  %


 


Eosinophils %  2.9  (0.00-5.0)  %


 


Basophils %  0.4  (0.0-0.4)  %


 


Absolute Granulocytes  4.93  (1.4-6.9)  


 


Basophils #  0.03  (0-0.4)  


 


Sodium   (137-145)  mmol/L


 


Potassium   (3.5-5.1)  mmol/L


 


Chloride   ()  mmol/L


 


Carbon Dioxide   (22-30)  mmol/L


 


Anion Gap   (5-15)  MEQ/L


 


BUN   (9-20)  mg/dL


 


Creatinine   (0.66-1.25)  mg/dL


 


Estimated GFR   ML/MIN


 


Glucose   ()  mg/dL


 


Calcium   (8.4-10.2)  mg/dL


 


Total Bilirubin   (0.2-1.3)  mg/dL


 


Direct Bilirubin   (0.0-0.4)  mg/dL


 


AST   (17-59)  U/L


 


ALT   (0-50)  U/L


 


Alkaline Phosphatase   ()  U/L


 


Troponin I   (0.000-0.034)  ng/mL


 


Serum Total Protein   (6.3-8.2)  g/dL


 


Albumin   (3.5-5.0)  g/dL


 


Lipase   ()  U/L


 


Urine Color   (YELLOW)  


 


Urine Appearance   (CLEAR)  


 


Urine pH   (5-6)  


 


Ur Specific Gravity   (1.005-1.025)  


 


Urine Protein   (Negative)  


 


Urine Ketones   (NEGATIVE)  


 


Urine Blood   (0-5)  Jose F/ul


 


Urine Nitrite   (NEGATIVE)  


 


Urine Bilirubin   (NEGATIVE)  


 


Urine Urobilinogen   (0-1)  mg/dL


 


Ur Leukocyte Esterase   (NEGATIVE)  


 


Urine WBC (Auto)   (0-5)  /HPF


 


Urine RBC (Auto)   (0-2)  /HPF


 


U Epithel Cells (Auto)   (FEW)  /HPF


 


Urine Bacteria (Auto)   (NEGATIVE)  /HPF


 


Urine Mucus (Auto)   (NEGATIVE)  /HPF


 


Urine Culture Reflexed   (NO)  


 


Urine Glucose   (NEGATIVE)  mg/dL














- Progress


Progress: unchanged


Progress Note: 





08/23/20 13:29


Troponin was reportedly negative according to the lab.


08/23/20 16:10


The patient presents with a chief complaint of a syncopal episode that occurred 

on Tuesday that sounds vasovagal in etiology given that he reportedly had a 

prodrome before briefly losing consciousness and impacting his head.  Given that

 he had a head injury in the context of taking Eliquis he was deemed high 

probability of a potential subdural hematoma and therefore a CT scan of his head

 was obtained and showed no evidence of acute cranial pathology.  Remaining 

laboratory work-up was relatively benign and his kidney function noted today is 

his baseline kidney function.  Possible the patient may have a mild concussion 

given his reported head injury after his syncopal episode but appears well with 

no cranial deficits or neuro deficits at this time and was able to ambulate 

without difficulty.  He was offered Tylenol in addition antiemetics in the 

emergency department and declined such.  His cardiac markers were within normal 

limits and I currently have a low suspicion for PE at this time given that the 

patient is taking a DOAC.  Mainly, the patient was discharged home with 

instructions to take Tylenol as needed for any ongoing pain and was offered 

antiemetics to take as an outpatient but declined.  He was instructed to follow-

up with his primary care provider for further evaluation and management.  He 

agreed with and verbally understood the discharge plan.


Counseled pt/family regarding: lab results, diagnosis, need for follow-up, rad 

results





- Departure


Departure Disposition: Home


Clinical Impression: 


 Head injury, Shoulder contusion, Syncope, Abrasion of left ear, Contusion of 

left ear





Condition: Stable


Critical Care Time: No


Referrals: 


RAJANI CHAPMAN MD [Primary Care Provider] - 


Additional Instructions: 


Please take Tylenol as needed for any headaches aches or pains.  You can 

purchase this medication over-the-counter.  Please take this medication as 

instructed.

## 2021-03-15 ENCOUNTER — HOSPITAL ENCOUNTER (EMERGENCY)
Dept: HOSPITAL 33 - ED | Age: 76
Discharge: ANOTHER HOSPITAL | End: 2021-03-15
Payer: MEDICARE

## 2021-03-15 VITALS — OXYGEN SATURATION: 96 % | HEART RATE: 82 BPM

## 2021-03-15 VITALS — SYSTOLIC BLOOD PRESSURE: 135 MMHG | DIASTOLIC BLOOD PRESSURE: 82 MMHG

## 2021-03-15 DIAGNOSIS — I25.10: ICD-10-CM

## 2021-03-15 DIAGNOSIS — J44.9: ICD-10-CM

## 2021-03-15 DIAGNOSIS — I10: ICD-10-CM

## 2021-03-15 DIAGNOSIS — Z79.899: ICD-10-CM

## 2021-03-15 DIAGNOSIS — E11.9: ICD-10-CM

## 2021-03-15 DIAGNOSIS — R55: Primary | ICD-10-CM

## 2021-03-15 LAB
ALBUMIN SERPL-MCNC: 4.3 G/DL (ref 3.5–5)
ALP SERPL-CCNC: 64 U/L (ref 38–126)
ALT SERPL-CCNC: 32 U/L (ref 0–50)
AMPHETAMINES UR QL: NEGATIVE
ANION GAP SERPL CALC-SCNC: 14.7 MEQ/L (ref 5–15)
AST SERPL QL: 38 U/L (ref 17–59)
BARBITURATES UR QL: NEGATIVE
BASOPHILS # BLD AUTO: 0.06 10*3/UL (ref 0–0.4)
BASOPHILS NFR BLD AUTO: 1 % (ref 0–0.4)
BENZODIAZ UR QL SCN: NEGATIVE
BILIRUB BLD-MCNC: 1 MG/DL (ref 0.2–1.3)
BNP SERPL-MCNC: 832 PG/ML (ref 0–1800)
BUN SERPL-MCNC: 20 MG/DL (ref 9–20)
CALCIUM SPEC-MCNC: 9.2 MG/DL (ref 8.4–10.2)
CHLORIDE SERPL-SCNC: 103 MMOL/L (ref 98–107)
CO2 SERPL-SCNC: 28 MMOL/L (ref 22–30)
COCAINE UR QL SCN: NEGATIVE
CREAT SERPL-MCNC: 1.39 MG/DL (ref 0.66–1.25)
EOSINOPHIL # BLD AUTO: 0.25 10*3/UL (ref 0–0.5)
ETHANOL SERPL-MCNC: < 10 MG/DL (ref 0–10)
GFR SERPLBLD BASED ON 1.73 SQ M-ARVRAT: 52.9 ML/MIN
GLUCOSE SERPL-MCNC: 132 MG/DL (ref 74–106)
GLUCOSE UR-MCNC: 50 MG/DL
HCT VFR BLD AUTO: 42 % (ref 42–50)
HGB BLD-MCNC: 13 GM/DL (ref 12.5–18)
LYMPHOCYTES # SPEC AUTO: 1.4 10*3/UL (ref 1–4.6)
MCH RBC QN AUTO: 28.6 PG (ref 26–32)
MCHC RBC AUTO-ENTMCNC: 31 G/DL (ref 32–36)
METHADONE UR QL: NEGATIVE
MONOCYTES # BLD AUTO: 0.71 10*3/UL (ref 0–1.3)
OPIATES UR QL: NEGATIVE
PCP UR QL CFM>20 NG/ML: NEGATIVE
PLATELET # BLD AUTO: 227 K/MM3 (ref 150–450)
POTASSIUM SERPLBLD-SCNC: 4.3 MMOL/L (ref 3.5–5.1)
PROT SERPL-MCNC: 7.1 G/DL (ref 6.3–8.2)
PROT UR STRIP-MCNC: NEGATIVE MG/DL
RBC # BLD AUTO: 4.55 M/MM3 (ref 4.1–5.6)
RBC #/AREA URNS HPF: (no result) /HPF (ref 0–2)
SODIUM SERPL-SCNC: 141 MMOL/L (ref 137–145)
THC UR QL SCN: NEGATIVE
WBC # BLD AUTO: 6.1 K/MM3 (ref 4–10.5)
WBC #/AREA URNS HPF: (no result) /HPF (ref 0–5)

## 2021-03-15 PROCEDURE — 83880 ASSAY OF NATRIURETIC PEPTIDE: CPT

## 2021-03-15 PROCEDURE — 81001 URINALYSIS AUTO W/SCOPE: CPT

## 2021-03-15 PROCEDURE — 84484 ASSAY OF TROPONIN QUANT: CPT

## 2021-03-15 PROCEDURE — 94760 N-INVAS EAR/PLS OXIMETRY 1: CPT

## 2021-03-15 PROCEDURE — 93041 RHYTHM ECG TRACING: CPT

## 2021-03-15 PROCEDURE — 80053 COMPREHEN METABOLIC PANEL: CPT

## 2021-03-15 PROCEDURE — 36415 COLL VENOUS BLD VENIPUNCTURE: CPT

## 2021-03-15 PROCEDURE — G0480 DRUG TEST DEF 1-7 CLASSES: HCPCS

## 2021-03-15 PROCEDURE — 36000 PLACE NEEDLE IN VEIN: CPT

## 2021-03-15 PROCEDURE — 85025 COMPLETE CBC W/AUTO DIFF WBC: CPT

## 2021-03-15 PROCEDURE — 93005 ELECTROCARDIOGRAM TRACING: CPT

## 2021-03-15 PROCEDURE — 80307 DRUG TEST PRSMV CHEM ANLYZR: CPT

## 2021-03-15 PROCEDURE — 70450 CT HEAD/BRAIN W/O DYE: CPT

## 2021-03-15 PROCEDURE — 71045 X-RAY EXAM CHEST 1 VIEW: CPT

## 2021-03-15 PROCEDURE — 99285 EMERGENCY DEPT VISIT HI MDM: CPT

## 2021-03-15 NOTE — ERPHSYRPT
- History of Present Illness


Time Seen by Provider: 03/15/21 17:20


Source: patient


Exam Limitations: no limitations


Patient Subjective Stated Complaint: syncope


Triage Nursing Assessment: pt to ED c/o syncopal episode at home while reaching 

for something in kitchen. family states he did lose consciousness for approx 1 

min but never became apnic per EMS. pt is on eliquis. A&Ox3. pt denies pain at 

this time. reports his cardiologist adjusted his pacemaker rates approx 2 weeks 

ago.


Physician History: 





Patient is a 75-year-old male presents to our ED via EMS for evaluation of 

syncope.  Syncope occurred just prior to arrival.  Per report patient was at his

home.  Patient was reaching while in the kitchen and experienced a syncopal 

event.  The event was witnessed per family.  Patient was unresponsive for 

approximately 1 minute.  Patient was not apneic.  Family states patient postured

slightly prior to recovery.  Family reports that patient has a history of 

syncope.  He has a pacemaker.  Per report patient's cardiologist recently 

increased the rate of the pacemaker from 70 to 80 bpm.  This change in rate 

occurred to address patient's syncopal episodes.  Patient is on Eliquis.  No 

chest pain.  No nausea vomiting or diaphoresis.  Patient feels well at this 

time.  He is alert and oriented x3.  Patient voices no other complaints at this 

time.


Timing/Duration: today


Severity: moderate


Modifying Factors: Improves With: nothing


Associated Symptoms: denies symptoms


Allergies/Adverse Reactions: 








Sulfa (Sulfonamide Antibiotics) Allergy (Severe, Verified 03/15/21 17:24)


   


   hallucinations 


codeine Allergy (Intermediate, Verified 03/15/21 17:24)


   Nausea and Vomiting


cephalexin [From Keflex] Allergy (Unknown, Verified 03/15/21 17:24)


   





Home Medications: 








Aspirin [Aspir-Low] 81 mg PO DAILY 01/17/16 [History]


Losartan Potassium 50 mg PO DAILY 01/17/16 [History]


Metformin HCl 500 mg*** [Glucophage 500 MG***] 500 mg PO HS 01/17/16 [History]


Metoprolol Succinate 75 mg PO BID 01/17/16 [History]


Multivitamin [Multi-Vitamin Daily] 1 each PO DAILY 01/17/16 [History]


Nabumetone [Relafen] 500 mg PO BID 01/17/16 [History]


Pyridoxine HCl [Vitamin B-6] 100 mg PO DAILY 01/17/16 [History]


Senna 8.6 mg*** [Senokot 8.6 mg***] 8.6 mg PO DAILY PRN PRN 01/17/16 [History]


Tamsulosin HCl 0.4 mg*** [Flomax 0.4 MG***] 0.8 mg PO HS 01/17/16 [History]


Vitamin E 400 Units*** [Vitamin E 400 UNIT SOFTGEL***] 400 unit PO DAILY 

01/17/16 [History]


Omeprazole 20 mg PO DAILY 02/29/16 [History]


Budesonide 0.5 mg/2 ml*** [Pulmicort 0.5 mg/2 ml Respules***] 0.5 ml IH UD 

08/24/16 [History]


Apixaban [Eliquis] 5 mg PO BID 06/26/20 [History]


Famotidine 20 mg*** [Pepcid 20 MG***] 20 mg PO DAILY 07/03/20 [History]


Amiodarone HCl 200 mg PO DAILY 02/02/21 [History]


Cyanocobalamin (Vitamin B-12) [Vitamin B12] 1,000 mcg PO DAILY 02/02/21 

[History]


PARoxetine HCL [Paroxetine HCl] 20 mg PO DAILY 02/02/21 [History]


Rosuvastatin Calcium 10 mg PO HS 02/02/21 [History]


Ubidecarenone [Co Q-10] 200 mg PO DAILY 02/02/21 [History]





Hx Tetanus, Diphtheria Vaccination/Date Given: No


Hx Influenza Vaccination/Date Given: Yes


Hx Pneumococcal Vaccination/Date Given: Yes





Travel Risk





- International Travel


Have you traveled outside of the country in past 3 weeks: No





- Coronavirus Screening


Are you exhibiting any of the following symptoms?: No


Close contact with a COVID-19 positive Pt in past 14-21 Days: No





- Review of Systems


Constitutional: No Symptoms, No Fever, No Chills


Eyes: No Symptoms


Ears, Nose, & Throat: No Symptoms


Respiratory: No Symptoms, No Cough, No Dyspnea


Cardiac: No Symptoms, No Chest Pain, No Edema, No Syncope


Abdominal/Gastrointestinal: No Symptoms, No Abdominal Pain, No Nausea, No 

Vomiting, No Diarrhea


Genitourinary Symptoms: No Symptoms, No Dysuria


Musculoskeletal: No Symptoms, No Back Pain, No Neck Pain


Skin: No Symptoms, No Rash


Neurological: No Symptoms, No Dizziness, No Focal Weakness, No Sensory Changes


Psychological: No Symptoms


Endocrine: No Symptoms


Hematologic/Lymphatic: No Symptoms


Immunological/Allergic: No Symptoms


All Other Systems: Reviewed and Negative





- Past Medical History


Pertinent Past Medical History: Yes


Neurological History: No Pertinent History


ENT History: No Pertinent History


Cardiac History: Arrhythmia, Coronary Artery Disease, Hypertension, Other


Respiratory History: COPD


Endocrine Medical History: Diabetes Type II


Musculoskeletal History: No Pertinent History


GI Medical History: Gallbladder Disease


 History: No Pertinent History


Psycho-Social History: Depression


Male Reproductive Disorders: Prostate Problems


Other Medical History: a fib, pacer/defib, anemia,





- Past Surgical History


Past Surgical History: Yes


Neuro Surgical History: No Pertinent History


Cardiac: Pacemaker


Respiratory: No Pertinent History


Gastrointestinal: Cholecystectomy


Genitourinary: No Pertinent History


Musculoskeletal: Orthopedic Surgery


Male Surgical History: Other


Other Surgical History: dental surgical extractions,left knee feb after fall on 

ice,tear of tendons repaired.





- Social History


Smoking Status: Former smoker


How long have you smoked: 42 years


Exposure to second hand smoke: No


Alcohol Use: None


Drug Use: none


Patient Lives Alone: No


Significant Family History: diabetes, hypertension





- Nursing Vital Signs


Nursing Vital Signs: 


                               Initial Vital Signs











Temperature  97.0 F   03/15/21 17:13


 


Pulse Rate  80   03/15/21 17:13


 


Respiratory Rate  18   03/15/21 17:13


 


Blood Pressure  132/75   03/15/21 17:13


 


O2 Sat by Pulse Oximetry  93 L  03/15/21 17:13








                                   Pain Scale











Pain Intensity                 0

















- Physical Exam


General Appearance: no apparent distress, alert


Eye Exam: PERRL/EOMI, eyes nml inspection


Ears, Nose, Throat Exam: normal ENT inspection, TMs normal, pharynx normal, 

moist mucous membranes


Neck Exam: normal inspection, non-tender, supple, full range of motion


Respiratory Exam: normal breath sounds, lungs clear, No respiratory distress


Cardiovascular Exam: regular rate/rhythm, normal heart sounds, normal peripheral

 pulses


Gastrointestinal/Abdomen Exam: soft, normal bowel sounds, No tenderness, No mass


Back Exam: normal inspection, normal range of motion, No CVA tenderness, No 

vertebral tenderness


Extremity Exam: normal inspection, normal range of motion, pelvis stable


Neurologic Exam: alert, oriented x 3, cooperative, normal mood/affect, nml 

cerebellar function, sensation nml, No motor deficits


Skin Exam: normal color, warm, dry, No rash


Lymphatic Exam: No adenopathy


**SpO2 Interpretation**: normal


SpO2: 96


O2 Delivery: Room Air





- Course


Nursing assessment & vital signs reviewed: Yes


EKG Interpreted by Me: RATE (80), Sinus Rhythm, NORMAL AXIS, NORMAL INTERVALS





- Radiology Exams


  ** Chest


X-ray Interpretation: Interpreted by me (Portable chest is clear.  Heart and 

mediastinal structures intact.  Bony thorax intact.  Nonacute chest.  Pacemaker 

observed)





- CT Exams


  ** Head


CT Interpretation: Tele-radiologist Report (Nonacute senile brain.)


Ordered Tests: 


                               Active Orders 24 hr











 Category Date Time Status


 


 Cardiac Monitor STAT Care  03/15/21 17:11 Active


 


 EKG-ER Only STAT Care  03/15/21 17:10 Active


 


 IV Insertion STAT Care  03/15/21 17:10 Active


 


 Pulse Oximetry (ED) STAT Care  03/15/21 17:10 Active


 


 CHEST 1 VIEW (PORTABLE) Stat Exams  03/15/21 17:11 Taken


 


 HEAD WITHOUT CONTRAST [CT] Stat Exams  03/15/21 17:12 Taken


 


 CBC W DIFF Stat Lab  03/15/21 17:26 Completed


 


 CMP Stat Lab  03/15/21 17:26 Completed


 


 ETHYL ALCOHOL Stat Lab  03/15/21 17:26 Completed


 


 NT PRO BNP Stat Lab  03/15/21 17:26 Completed


 


 TROPONIN Q3H Lab  03/15/21 17:26 Completed


 


 TROPONIN Q3H Lab  03/15/21 20:22 Received


 


 TROPONIN Q3H Lab  03/15/21 23:15 Ordered


 


 TROPONIN Q3H Lab  03/16/21 02:15 Ordered


 


 TROPONIN Q3H Lab  03/16/21 05:15 Ordered


 


 UA W/RFX UR CULTURE Stat Lab  03/15/21 19:34 Completed


 


 Urine Triage Profile Stat Lab  03/15/21 19:34 Completed











Lab/Rad Data: 


                           Laboratory Result Diagrams





                                 03/15/21 17:26 





                                 03/15/21 17:26 





                               Laboratory Results











  03/15/21 03/15/21 03/15/21 Range/Units





  20:22 19:34 19:34 


 


WBC     (4.0-10.5)  K/mm3


 


RBC     (4.1-5.6)  M/mm3


 


Hgb     (12.5-18.0)  gm/dl


 


Hct     (42-50)  %


 


MCV     ()  fl


 


MCH     (26-32)  pg


 


MCHC     (32-36)  g/dl


 


RDW     (11.5-14.0)  %


 


Plt Count     (150-450)  K/mm3


 


MPV     (7.5-11.0)  fl


 


Gran %     (36.0-66.0)  %


 


Eos # (Auto)     (0-0.5)  


 


Absolute Lymphs (auto)     (1.0-4.6)  


 


Absolute Monos (auto)     (0.0-1.3)  


 


Lymphocytes %     (24.0-44.0)  %


 


Monocytes %     (0.0-12.0)  %


 


Eosinophils %     (0.00-5.0)  %


 


Basophils %     (0.0-0.4)  %


 


Absolute Granulocytes     (1.4-6.9)  


 


Basophils #     (0-0.4)  


 


Sodium     (137-145)  mmol/L


 


Potassium     (3.5-5.1)  mmol/L


 


Chloride     ()  mmol/L


 


Carbon Dioxide     (22-30)  mmol/L


 


Anion Gap     (5-15)  MEQ/L


 


BUN     (9-20)  mg/dL


 


Creatinine     (0.66-1.25)  mg/dL


 


Estimated GFR     ML/MIN


 


Glucose     ()  mg/dL


 


Calcium     (8.4-10.2)  mg/dL


 


Total Bilirubin     (0.2-1.3)  mg/dL


 


AST     (17-59)  U/L


 


ALT     (0-50)  U/L


 


Alkaline Phosphatase     ()  U/L


 


Troponin I  0.016    (0.000-0.034)  ng/mL


 


NT-Pro-B Natriuret Pep     (0-1800)  pg/mL


 


Serum Total Protein     (6.3-8.2)  g/dL


 


Albumin     (3.5-5.0)  g/dL


 


Urine Color    YELLOW  (YELLOW)  


 


Urine Appearance    SLIGHTLY CLOUDY  (CLEAR)  


 


Urine pH    5.0  (5-6)  


 


Ur Specific Gravity    1.023  (1.005-1.025)  


 


Urine Protein    NEGATIVE  (Negative)  


 


Urine Ketones    NEGATIVE  (NEGATIVE)  


 


Urine Blood    NEGATIVE  (0-5)  Jose F/ul


 


Urine Nitrite    NEGATIVE  (NEGATIVE)  


 


Urine Bilirubin    NEGATIVE  (NEGATIVE)  


 


Urine Urobilinogen    NEGATIVE  (0-1)  mg/dL


 


Ur Leukocyte Esterase    NEGATIVE  (NEGATIVE)  


 


Urine WBC (Auto)    0-2  (0-5)  /HPF


 


Urine RBC (Auto)    0-2  (0-2)  /HPF


 


U Epithel Cells (Auto)    NONE  (FEW)  /HPF


 


Urine Bacteria (Auto)    NONE SEEN  (NEGATIVE)  /HPF


 


Urine Mucus (Auto)    SLIGHT  (NEGATIVE)  /HPF


 


Urine Culture Reflexed    NO  (NO)  


 


Urine Glucose    50  (NEGATIVE)  mg/dL


 


Urine Opiates Level   NEGATIVE   (NEGATIVE)  


 


Ur Methadone   NEGATIVE   (NEGATIVE)  


 


Urine Barbiturates   NEGATIVE   (NEGATIVE)  


 


Ur Phencyclidine (PCP)   NEGATIVE   (NEGATIVE)  


 


Urine Amphetamine   NEGATIVE   (NEGATIVE)  


 


U Benzodiazepine Level   NEGATIVE   (NEGATIVE)  


 


Urine Cocaine   NEGATIVE   (NEGATIVE)  


 


Urine Marijuana (THC)   NEGATIVE   (NEGATIVE)  


 


Ethyl Alcohol     (0-10)  mg/dL














  03/15/21 03/15/21 03/15/21 Range/Units





  17:26 17:26 17:26 


 


WBC    6.1  (4.0-10.5)  K/mm3


 


RBC    4.55  (4.1-5.6)  M/mm3


 


Hgb    13.0  (12.5-18.0)  gm/dl


 


Hct    42.0  (42-50)  %


 


MCV    92.3  ()  fl


 


MCH    28.6  (26-32)  pg


 


MCHC    31.0 L  (32-36)  g/dl


 


RDW    14.9 H  (11.5-14.0)  %


 


Plt Count    227  (150-450)  K/mm3


 


MPV    11.4 H  (7.5-11.0)  fl


 


Gran %    60.2  (36.0-66.0)  %


 


Eos # (Auto)    0.25  (0-0.5)  


 


Absolute Lymphs (auto)    1.40  (1.0-4.6)  


 


Absolute Monos (auto)    0.71  (0.0-1.3)  


 


Lymphocytes %    23.0 L  (24.0-44.0)  %


 


Monocytes %    11.7  (0.0-12.0)  %


 


Eosinophils %    4.1  (0.00-5.0)  %


 


Basophils %    1.0  (0.0-0.4)  %


 


Absolute Granulocytes    3.67  (1.4-6.9)  


 


Basophils #    0.06  (0-0.4)  


 


Sodium   141   (137-145)  mmol/L


 


Potassium   4.3   (3.5-5.1)  mmol/L


 


Chloride   103   ()  mmol/L


 


Carbon Dioxide   28   (22-30)  mmol/L


 


Anion Gap   14.7   (5-15)  MEQ/L


 


BUN   20   (9-20)  mg/dL


 


Creatinine   1.39 H   (0.66-1.25)  mg/dL


 


Estimated GFR   52.9   ML/MIN


 


Glucose   132 H   ()  mg/dL


 


Calcium   9.2   (8.4-10.2)  mg/dL


 


Total Bilirubin   1.00   (0.2-1.3)  mg/dL


 


AST   38   (17-59)  U/L


 


ALT   32   (0-50)  U/L


 


Alkaline Phosphatase   64   ()  U/L


 


Troponin I  < 0.012    (0.000-0.034)  ng/mL


 


NT-Pro-B Natriuret Pep   832   (0-1800)  pg/mL


 


Serum Total Protein   7.1   (6.3-8.2)  g/dL


 


Albumin   4.3   (3.5-5.0)  g/dL


 


Urine Color     (YELLOW)  


 


Urine Appearance     (CLEAR)  


 


Urine pH     (5-6)  


 


Ur Specific Gravity     (1.005-1.025)  


 


Urine Protein     (Negative)  


 


Urine Ketones     (NEGATIVE)  


 


Urine Blood     (0-5)  Joes F/ul


 


Urine Nitrite     (NEGATIVE)  


 


Urine Bilirubin     (NEGATIVE)  


 


Urine Urobilinogen     (0-1)  mg/dL


 


Ur Leukocyte Esterase     (NEGATIVE)  


 


Urine WBC (Auto)     (0-5)  /HPF


 


Urine RBC (Auto)     (0-2)  /HPF


 


U Epithel Cells (Auto)     (FEW)  /HPF


 


Urine Bacteria (Auto)     (NEGATIVE)  /HPF


 


Urine Mucus (Auto)     (NEGATIVE)  /HPF


 


Urine Culture Reflexed     (NO)  


 


Urine Glucose     (NEGATIVE)  mg/dL


 


Urine Opiates Level     (NEGATIVE)  


 


Ur Methadone     (NEGATIVE)  


 


Urine Barbiturates     (NEGATIVE)  


 


Ur Phencyclidine (PCP)     (NEGATIVE)  


 


Urine Amphetamine     (NEGATIVE)  


 


U Benzodiazepine Level     (NEGATIVE)  


 


Urine Cocaine     (NEGATIVE)  


 


Urine Marijuana (THC)     (NEGATIVE)  


 


Ethyl Alcohol   < 10   (0-10)  mg/dL














- Progress


Progress: improved


Progress Note: 


Patient is 75-year-old male with a history of syncope.  Patient presents to our 

ED with syncope.  Preliminary work-up negative.  Case discussed with patient's 

primary care physician Dr. Chapman who advised transferring patient to [facility].  Per Dr Chapman, patient will likely require a cardiac ablation for 

recurrent syncope episodes per patient's cardiologist.  Case discussed with Dr. Hart ED physician covering [facility] accepts transfer.  Plan of care 

discussed with patient.  He agrees to transfer to [facility] for further 

evaluation and treatment.  Patient daughter updated.  Of note staff performed 

orthostatics to assess for orthostatic hypotension and normal orthostatic 

hypotension was observed.


03/15/21 21:07





03/15/21 21:11





Discussed with : Tommy


Will see patient in: other


Counseled pt/family regarding: lab results, diagnosis, rad results





- Departure


Departure Disposition: Transfer


Clinical Impression: 


 Syncope and collapse





Condition: Stable


Critical Care Time: No


Referrals: 


RAJANI CHAPMAN MD [Primary Care Provider] -

## 2021-03-16 NOTE — XRAY
Indication: Syncope and chest pain.



Comparison: February 29, 2016.



Portable chest does not completely include the right costophrenic angle.  New

minimal left base subsegmental atelectasis/scarring.  Remaining lungs clear.

Heart is not enlarged for AP portable technique with new left dual-lead

pacemaker.  Bony thorax intact again with mild osteopenia and degenerative

changes.



Impression: Nonacute limited chest with chronic features.

## 2021-03-16 NOTE — XRAY
Indication: Syncope and chest pain.  Blood thinner therapy.



Multiple contiguous images obtained through the head without contrast.



Comparison: August 23, 2020.



Stable age-appropriate global atrophy and minimal periventricular degenerative

micro-ischemia.  No acute intracranial hemorrhage, abnormal extra-axial fluid

collection, or mass effect.  Fourth ventricle is midline without

hydrocephalus.  Bony calvarium intact.  Again 1.5 cm right sphenoid sinus

polyp/retention cyst.  Remaining visualized paranasal sinuses and mastoid air

cells are clear.



Impression: Continued nonacute senile brain with incidental right sphenoid

sinus polyp/retention cyst.

## 2022-09-19 ENCOUNTER — HOSPITAL ENCOUNTER (INPATIENT)
Dept: HOSPITAL 33 - ED | Age: 77
LOS: 6 days | Discharge: HOME | DRG: 388 | End: 2022-09-25
Attending: INTERNAL MEDICINE | Admitting: INTERNAL MEDICINE
Payer: MEDICARE

## 2022-09-19 DIAGNOSIS — Z79.899: ICD-10-CM

## 2022-09-19 DIAGNOSIS — R10.84: ICD-10-CM

## 2022-09-19 DIAGNOSIS — Z20.828: ICD-10-CM

## 2022-09-19 DIAGNOSIS — Z79.01: ICD-10-CM

## 2022-09-19 DIAGNOSIS — K85.90: ICD-10-CM

## 2022-09-19 DIAGNOSIS — K52.9: ICD-10-CM

## 2022-09-19 DIAGNOSIS — I11.0: ICD-10-CM

## 2022-09-19 DIAGNOSIS — E11.9: ICD-10-CM

## 2022-09-19 DIAGNOSIS — I50.9: ICD-10-CM

## 2022-09-19 DIAGNOSIS — K56.0: Primary | ICD-10-CM

## 2022-09-19 LAB
ALBUMIN SERPL-MCNC: 4.6 G/DL (ref 3.5–5)
ALP SERPL-CCNC: 96 U/L (ref 38–126)
ALT SERPL-CCNC: 30 U/L (ref 0–50)
AMYLASE SERPL-CCNC: 122 U/L (ref 30–110)
ANION GAP SERPL CALC-SCNC: 15.9 MEQ/L (ref 5–15)
AST SERPL QL: 37 U/L (ref 17–59)
BASOPHILS # BLD AUTO: 0.1 X10^3/UL (ref 0–0.4)
BILIRUB BLD-MCNC: 1.2 MG/DL (ref 0.2–1.3)
BUN SERPL-MCNC: 21 MG/DL (ref 9–20)
CALCIUM SPEC-MCNC: 9.2 MG/DL (ref 8.4–10.2)
CHLORIDE SERPL-SCNC: 103 MMOL/L (ref 98–107)
CO2 SERPL-SCNC: 25 MMOL/L (ref 22–30)
CREAT SERPL-MCNC: 1.3 MG/DL (ref 0.66–1.25)
EOSINOPHIL # BLD AUTO: 0.28 X10^3/UL (ref 0–0.5)
FLUAV AG NPH QL IA: NEGATIVE
FLUBV AG NPH QL IA: NEGATIVE
GFR SERPLBLD BASED ON 1.73 SQ M-ARVRAT: 57 ML/MIN
GLUCOSE SERPL-MCNC: 235 MG/DL (ref 74–106)
GLUCOSE UR-MCNC: >=1000 MG/DL
HCT VFR BLD AUTO: 42.6 % (ref 42–50)
HGB BLD-MCNC: 13 G/DL (ref 12.5–18)
LIPASE SERPL-CCNC: 549 U/L (ref 23–300)
LYMPHOCYTES # SPEC AUTO: 1.05 X10^3/UL (ref 1–4.6)
MCH RBC QN AUTO: 27.3 PG (ref 26–32)
MCHC RBC AUTO-ENTMCNC: 30.5 G/DL (ref 32–36)
MONOCYTES # BLD AUTO: 0.74 X10^3/UL (ref 0–1.3)
PLATELET # BLD AUTO: 303 X10^3/UL (ref 150–450)
POTASSIUM SERPLBLD-SCNC: 4.2 MMOL/L (ref 3.5–5.1)
PROT SERPL-MCNC: 7.7 G/DL (ref 6.3–8.2)
PROT UR STRIP-MCNC: NEGATIVE MG/DL
RBC # BLD AUTO: 4.76 X10^6/UL (ref 4.1–5.6)
RBC # UR AUTO: NEGATIVE ERY/UL (ref 0–5)
RBC #/AREA URNS HPF: (no result) /HPF (ref 0–2)
RSV AG SPEC QL IA: NEGATIVE
SARS-COV-2 AG RESP QL IA.RAPID: NEGATIVE
SODIUM SERPL-SCNC: 140 MMOL/L (ref 137–145)
UA DIPSTICK PNL UR: (no result)
URINE CULTURED INDICATED?: NO
WBC # BLD AUTO: 14 X10^3/UL (ref 4–10.5)
WBC #/AREA URNS HPF: (no result) /HPF (ref 0–5)

## 2022-09-19 PROCEDURE — 81015 MICROSCOPIC EXAM OF URINE: CPT

## 2022-09-19 PROCEDURE — 0241U: CPT

## 2022-09-19 PROCEDURE — 99285 EMERGENCY DEPT VISIT HI MDM: CPT

## 2022-09-19 PROCEDURE — 71045 X-RAY EXAM CHEST 1 VIEW: CPT

## 2022-09-19 PROCEDURE — 85027 COMPLETE CBC AUTOMATED: CPT

## 2022-09-19 PROCEDURE — 83036 HEMOGLOBIN GLYCOSYLATED A1C: CPT

## 2022-09-19 PROCEDURE — 83735 ASSAY OF MAGNESIUM: CPT

## 2022-09-19 PROCEDURE — 94667 MNPJ CHEST WALL 1ST: CPT

## 2022-09-19 PROCEDURE — 80053 COMPREHEN METABOLIC PANEL: CPT

## 2022-09-19 PROCEDURE — 74250 X-RAY XM SM INT 1CNTRST STD: CPT

## 2022-09-19 PROCEDURE — 94640 AIRWAY INHALATION TREATMENT: CPT

## 2022-09-19 PROCEDURE — 80048 BASIC METABOLIC PNL TOTAL CA: CPT

## 2022-09-19 PROCEDURE — 36415 COLL VENOUS BLD VENIPUNCTURE: CPT

## 2022-09-19 PROCEDURE — 83605 ASSAY OF LACTIC ACID: CPT

## 2022-09-19 PROCEDURE — 83690 ASSAY OF LIPASE: CPT

## 2022-09-19 PROCEDURE — 96374 THER/PROPH/DIAG INJ IV PUSH: CPT

## 2022-09-19 PROCEDURE — 82947 ASSAY GLUCOSE BLOOD QUANT: CPT

## 2022-09-19 PROCEDURE — 82150 ASSAY OF AMYLASE: CPT

## 2022-09-19 PROCEDURE — 85025 COMPLETE CBC W/AUTO DIFF WBC: CPT

## 2022-09-19 PROCEDURE — 94668 MNPJ CHEST WALL SBSQ: CPT

## 2022-09-19 PROCEDURE — G0378 HOSPITAL OBSERVATION PER HR: HCPCS

## 2022-09-19 PROCEDURE — 94760 N-INVAS EAR/PLS OXIMETRY 1: CPT

## 2022-09-19 PROCEDURE — 36000 PLACE NEEDLE IN VEIN: CPT

## 2022-09-19 PROCEDURE — 96375 TX/PRO/DX INJ NEW DRUG ADDON: CPT

## 2022-09-19 PROCEDURE — 74176 CT ABD & PELVIS W/O CONTRAST: CPT

## 2022-09-19 PROCEDURE — 94660 CPAP INITIATION&MGMT: CPT

## 2022-09-19 RX ADMIN — ALBUTEROL SULFATE SCH PUFF: 90 AEROSOL, METERED RESPIRATORY (INHALATION) at 23:25

## 2022-09-19 NOTE — ERPHSYRPT
- History of Present Illness


Time Seen by Provider: 09/19/22 19:42


Historian: patient


Exam Limitations: no limitations


Physician History: 





This is a 76-year-old obese white male patient of Dr. Chapman and presents to 

the emergency department from home with left-sided abdominal pain which radiates

into his left flank that began this afternoon.  He did have a small bowel 

movement today but it was not much of 1 per his report.  He has had mild nausea 

but no vomiting or diarrhea.  Patient has a history of arrhythmias, 

hypertension, peripheral neuropathy, COPD, pulmonary embolism, diabetes type 2, 

pacemaker defibrillator placement and prostate issues.  Patient denies chest 

pain and he denies shortness of breath.


Timing/Duration: today


Activities at Onset: none


Quality: dullness, pressure


Abdominal Pain Onset Location: [facility], [facility]


Pain Radiation: flank (Left)


Severity of Pain-Max: mild (To moderate)


Severity of Pain-Current: mild (To moderate)


Associated Symptoms: nausea, No chest pain, No diarrhea, No fever/chills, No 

shortness of breath, No vomiting


Previous symptoms: no prior history


Allergies/Adverse Reactions: 








Sulfa (Sulfonamide Antibiotics) Allergy (Severe, Verified 09/19/22 19:49)


   


   hallucinations 


codeine Allergy (Intermediate, Verified 09/19/22 19:49)


   Nausea and Vomiting


cephalexin [From Keflex] Allergy (Unknown, Verified 09/19/22 19:49)


   





Home Medications: 








Losartan Potassium 50 mg PO DAILY 01/17/16 [History]


Metformin HCl 500 mg*** [Glucophage 500 MG***] 500 mg PO HS 01/17/16 [History]


Metoprolol Succinate 75 mg PO BID 01/17/16 [History]


Multivitamin [Multi-Vitamin Daily] 1 each PO DAILY 01/17/16 [History]


Nabumetone [Relafen] 500 mg PO BID 01/17/16 [History]


Pyridoxine HCl (Vitamin B6) [Vitamin B-6] 100 mg PO DAILY 01/17/16 [History]


Senna 8.6 mg*** [Senokot 8.6 mg***] 8.6 mg PO DAILY PRN PRN 01/17/16 [History]


Tamsulosin HCl 0.4 mg*** [Flomax 0.4 MG***] 0.8 mg PO HS 01/17/16 [History]


Vitamin E 400 Units*** [Vitamin E 400 UNIT SOFTGEL***] 400 unit PO DAILY 

01/17/16 [History]


Omeprazole 20 mg PO DAILY 02/29/16 [History]


Apixaban [Eliquis] 5 mg PO BID 06/26/20 [History]


Famotidine 20 mg*** [Pepcid 20 MG***] 20 mg PO DAILY 07/03/20 [History]


Cyanocobalamin (Vitamin B-12) [Vitamin B12] 1,000 mcg PO DAILY 02/02/21 

[History]


PARoxetine HCL [Paroxetine HCl] 20 mg PO DAILY 02/02/21 [History]


Rosuvastatin Calcium 10 mg PO HS 02/02/21 [History]


Ubidecarenone [Co Q-10] 200 mg PO DAILY 02/02/21 [History]


Albuterol Sulfate [Proair Hfa] 8.5 gm IH Q4-6HPRN PRN 09/19/22 [History]


Amlodipine Besylate [Norvasc] 2.5 mg PO DAILY 09/19/22 [History]


Omega-3/Dha/Epa/Fish Oil [Fish Oil 1,000 mg Softgel] 4,000 mg PO BID 09/19/22 

[History]





Hx Tetanus, Diphtheria Vaccination/Date Given: No


Hx Influenza Vaccination/Date Given: Yes


Hx Pneumococcal Vaccination/Date Given: Yes





Travel Risk





- International Travel


Have you traveled outside of the country in past 3 weeks: No





- Coronavirus Screening


Are you exhibiting any of the following symptoms?: No


Close contact with a COVID-19 positive Pt in past 14-21 Days: No





- Review of Systems


Constitutional: No Symptoms


Eyes: No Symptoms


Ears, Nose, & Throat: No Symptoms


Respiratory: No Symptoms


Cardiac: No Symptoms


Abdominal/Gastrointestinal: Abdominal Pain (Left side), Nausea, No Vomiting, No 

Diarrhea, No Constipation


Genitourinary Symptoms: No Symptoms


Musculoskeletal: No Symptoms


Skin: No Symptoms


Neurological: No Symptoms


Psychological: No Symptoms


Endocrine: No Symptoms


Hematologic/Lymphatic: No Symptoms


Immunological/Allergic: No Symptoms


All Other Systems: Reviewed and Negative





- Past Medical History


Pertinent Past Medical History: Yes


Neurological History: Peripheral Neuropathy


ENT History: No Pertinent History


Cardiac History: Arrhythmia, Hypertension


Respiratory History: COPD, Pulmonary Embolism, Sleep Apnea


Endocrine Medical History: Diabetes Type II


Musculoskeletal History: Osteoarthritis


GI Medical History: Gallbladder Disease


 History: No Pertinent History


Psycho-Social History: Depression


Male Reproductive Disorders: Prostate Problems


Other Medical History: SURGERY ON L QUAD AFTER A TEAR, CODED AT REGIONAL DUE TO 

A BLOOD CLOT. RECENT BRAIN SCAN BY DR. SAENZ, WAS PASSING OUT DUE TO SYCOPE. 

ABLASION OF HEART TO TX AFIB 1 MONTH AGO. PACEMAKER, DEFIBRILLATOR.





- Past Surgical History


Past Surgical History: Yes


Neuro Surgical History: No Pertinent History


Cardiac: Pacemaker


Respiratory: No Pertinent History


Gastrointestinal: Cholecystectomy


Genitourinary: No Pertinent History


Musculoskeletal: Orthopedic Surgery


Male Surgical History: Other


Other Surgical History: dental surgical extractions,left knee feb after fall on 

ice,tear of tendons repaired.





- Social History


Smoking Status: Former smoker


How long have you smoked: 42 years


Exposure to second hand smoke: No


Alcohol Use: None


Drug Use: none


Patient Lives Alone: No


Significant Family History: diabetes, hypertension





- Nursing Vital Signs


Nursing Vital Signs: 


                               Initial Vital Signs











Temperature  97.1 F   09/19/22 19:33


 


Pulse Rate  60   09/19/22 19:33


 


Respiratory Rate  20   09/19/22 19:33


 


Blood Pressure  175/85   09/19/22 19:33


 


O2 Sat by Pulse Oximetry  97   09/19/22 19:33








                                   Pain Scale











Pain Intensity                 7

















- Physical Exam


General Appearance: no apparent distress, alert, obese


Eye Exam: PERRL/EOMI, eyes nml inspection


Ears, Nose, Throat Exam: normal ENT inspection, moist mucous membranes


Neck Exam: normal inspection, non-tender, supple, full range of motion


Respiratory Exam: normal breath sounds, lungs clear, No chest tenderness, No 

respiratory distress


Cardiovascular Exam: regular rate/rhythm, normal heart sounds, normal peripheral

 pulses


Gastrointestinal/Abdomen Exam: soft, normal bowel sounds, tenderness (Mild left 

side), guarding ( tenderness to palpation mild left side), No rebound ( 

tenderness to palpation)


Rectal Exam: not done


Back Exam: normal inspection, normal range of motion, No CVA tenderness, No 

vertebral tenderness


Extremity Exam: normal inspection, normal range of motion, pelvis stable


Neurologic Exam: alert, oriented x 3, cooperative, CNs II-XII nml as tested, 

normal mood/affect, nml cerebellar function, nml station & gait, sensation nml


Skin Exam: normal color, warm, dry


Lymphatic Exam: No adenopathy


**SpO2 Interpretation**: normal


SpO2: 97


O2 Delivery: Room Air





- Course


Nursing assessment & vital signs reviewed: Yes


Ordered Tests: 


                               Active Orders 24 hr











 Category Date Time Status


 


 IV Insertion STAT Care  09/19/22 20:32 Active


 


 ABDOMEN AND PELVIS W/0 CONTRAS [CT] Stat Exams  09/19/22 20:30 Taken


 


 AMYLASE Stat Lab  09/19/22 20:36 Completed


 


 CBC W DIFF Stat Lab  09/19/22 20:36 Completed


 


 CMP Stat Lab  09/19/22 20:36 Completed


 


 LIPASE Stat Lab  09/19/22 20:36 Completed


 


 Lactic Acid Stat Lab  09/19/22 20:38 Completed


 


 UA W/RFX CULTURE Stat Lab  09/19/22 Ordered








Medication Summary











Generic Name Dose Route Start Last Admin





  Trade Name Freq  PRN Reason Stop Dose Admin


 


Metronidazole  500 mg in 100 mls @ 200 mls/hr  09/19/22 22:16 





  Flagyl 500 Mg Ivpb  IV  09/19/22 22:45 





  STAT STA  














Discontinued Medications














Generic Name Dose Route Start Last Admin





  Trade Name Freq  PRN Reason Stop Dose Admin


 


Sodium Chloride  1,000 mls @ 999 mls/hr  09/19/22 20:59  09/19/22 21:02





  Sodium Chloride 0.9% 1000 Ml  IV  09/19/22 21:59  999 mls/hr





  .Q1H1M STA   Administration


 


Sodium Chloride  Confirm  09/19/22 21:01 





  Sodium Chloride 0.9% 1000 Ml  Administered  09/19/22 21:02 





  Dose  





  1,000 mls @ ud  





  .ROUTE  





  .STK-MED ONE  


 


Morphine Sulfate  4 mg  09/19/22 22:12 





  Morphine Sulfate 4 Mg/Ml Injection  IV  09/19/22 22:13 





  STAT ONE  


 


Ondansetron HCl  4 mg  09/19/22 22:12 





  Ondansetron Hcl 4 Mg/2 Ml Vial  IV  09/19/22 22:13 





  STAT ONE  











Lab/Rad Data: 


                           Laboratory Result Diagrams





                                 09/19/22 20:36 





                                 09/19/22 20:36 





                               Laboratory Results











  09/19/22 09/19/22 09/19/22 Range/Units





  20:38 20:36 20:36 


 


WBC    14.0 H  (4.0-10.5)  x10^3/uL


 


RBC    4.76  (4.1-5.6)  x10^6/uL


 


Hgb    13.0  (12.5-18.0)  g/dL


 


Hct    42.6  (42-50)  %


 


MCV    89.5  ()  fL


 


MCH    27.3  (26-32)  pg


 


MCHC    30.5 L  (32-36)  g/dL


 


RDW    14.0  (11.5-14.0)  %


 


Plt Count    303  (150-450)  x10^3/uL


 


MPV    11.9 H  (7.5-11.0)  fL


 


Gran %    84.0 H  (36.0-66.0)  %


 


Immature Gran % (Auto)    0.5 H  (0.00-0.4)  %


 


Nucleat RBC Rel Count    0.0  (0.00-0.1)  %


 


Eos # (Auto)    0.28  (0-0.5)  x10^3/uL


 


Immature Gran # (Auto)    0.07 H  (0.00-0.03)  x10^3u/L


 


Absolute Lymphs (auto)    1.05  (1.0-4.6)  x10^3/uL


 


Absolute Monos (auto)    0.74  (0.0-1.3)  x10^3/uL


 


Absolute Nucleated RBC    0.00  (0.00-0.01)  x10^3u/L


 


Lymphocytes %    7.5 L  (24.0-44.0)  %


 


Monocytes %    5.3  (0.0-12.0)  %


 


Eosinophils %    2.0  (0.00-5.0)  %


 


Basophils %    0.7  (0.0-0.4)  %


 


Absolute Granulocytes    11.72 H  (1.4-6.9)  x10^3/uL


 


Basophils #    0.10  (0-0.4)  x10^3/uL


 


Sodium   140   (137-145)  mmol/L


 


Potassium   4.2   (3.5-5.1)  mmol/L


 


Chloride   103   ()  mmol/L


 


Carbon Dioxide   25   (22-30)  mmol/L


 


Anion Gap   15.9 H   (5-15)  MEQ/L


 


BUN   21 H   (9-20)  mg/dL


 


Creatinine   1.30 H   (0.66-1.25)  mg/dL


 


Estimated GFR   57.0   ML/MIN


 


Glucose   235 H   ()  mg/dL


 


Lactic Acid  3.1 H    (0.4-2.0)  


 


Calcium   9.2   (8.4-10.2)  mg/dL


 


Total Bilirubin   1.20   (0.2-1.3)  mg/dL


 


AST   37   (17-59)  U/L


 


ALT   30   (0-50)  U/L


 


Alkaline Phosphatase   96   ()  U/L


 


Serum Total Protein   7.7   (6.3-8.2)  g/dL


 


Albumin   4.6   (3.5-5.0)  g/dL


 


Amylase   122 H   ()  U/L


 


Lipase   549 H   ()  U/L














- Progress


Progress: improved, pain not gone completely, re-examined


Progress Note: 





09/19/22 22:18


CAT scan of the abdomen pelvis without contrast shows fluid distended stomach 

and small bowel with fluid levels.  Ileus versus gastroenteritis.





Medical decision making: This patient has at least an ileus versus 

gastroenteritis.  He has significant of pain and he requested pain medication.  

We are awaiting his urinalysis study to determine the degree of dehydration if 

present and also to see if there is UTI.  Patient also had an elevated lactic 

acid level at 3.1.  He has at least a mild pancreatitis as well.  I spoke with 

his primary care doctor Dr. Chapman.  We will place him in observation and 

provide him with IV hydration, intravenous antibiotics of at least Flagyl, 

antiemetics and intravenous pain medication.  I will repeat labs tomorrow 

morning.


Discussed with : Tommy


Counseled pt/family regarding: lab results, diagnosis, rad results





- Departure


Departure Disposition: Observation


Clinical Impression: 


 Abdominal pain, Pancreatitis, Gastroenteritis, Leukocytosis





Condition: Stable


Critical Care Time: No


Referrals: 


RAJANI CHAPMAN MD [Primary Care Provider] - Follow up/PCP as directed

## 2022-09-20 LAB
ALBUMIN SERPL-MCNC: 4.5 G/DL (ref 3.5–5)
ALP SERPL-CCNC: 90 U/L (ref 38–126)
ALT SERPL-CCNC: 30 U/L (ref 0–50)
AMYLASE SERPL-CCNC: 95 U/L (ref 30–110)
ANION GAP SERPL CALC-SCNC: 18.5 MEQ/L (ref 5–15)
AST SERPL QL: 34 U/L (ref 17–59)
BASOPHILS # BLD AUTO: 0.06 X10^3/UL (ref 0–0.4)
BILIRUB BLD-MCNC: 1.6 MG/DL (ref 0.2–1.3)
BLD SMEAR INTERP: YES
BUN SERPL-MCNC: 22 MG/DL (ref 9–20)
CALCIUM SPEC-MCNC: 8.7 MG/DL (ref 8.4–10.2)
CHLORIDE SERPL-SCNC: 104 MMOL/L (ref 98–107)
CO2 SERPL-SCNC: 22 MMOL/L (ref 22–30)
CREAT SERPL-MCNC: 1.37 MG/DL (ref 0.66–1.25)
EOSINOPHIL # BLD AUTO: 0.03 X10^3/UL (ref 0–0.5)
GFR SERPLBLD BASED ON 1.73 SQ M-ARVRAT: 53.6 ML/MIN
GLUCOSE SERPL-MCNC: 285 MG/DL (ref 74–106)
HCT VFR BLD AUTO: 40.6 % (ref 42–50)
HGB BLD-MCNC: 12.8 G/DL (ref 12.5–18)
LIPASE SERPL-CCNC: 194 U/L (ref 23–300)
LYMPHOCYTES # SPEC AUTO: 0.4 X10^3/UL (ref 1–4.6)
MCH RBC QN AUTO: 27.8 PG (ref 26–32)
MCHC RBC AUTO-ENTMCNC: 31.5 G/DL (ref 32–36)
MONOCYTES # BLD AUTO: 1.33 X10^3/UL (ref 0–1.3)
PLATELET # BLD AUTO: 312 X10^3/UL (ref 150–450)
POTASSIUM SERPLBLD-SCNC: 5.1 MMOL/L (ref 3.5–5.1)
PROT SERPL-MCNC: 7.3 G/DL (ref 6.3–8.2)
RBC # BLD AUTO: 4.6 X10^6/UL (ref 4.1–5.6)
SODIUM SERPL-SCNC: 140 MMOL/L (ref 137–145)
WBC # BLD AUTO: 19.4 X10^3/UL (ref 4–10.5)

## 2022-09-20 RX ADMIN — METRONIDAZOLE SCH MLS/HR: 500 INJECTION, SOLUTION INTRAVENOUS at 11:42

## 2022-09-20 RX ADMIN — METOPROLOL SUCCINATE SCH: 50 TABLET, EXTENDED RELEASE ORAL at 13:42

## 2022-09-20 RX ADMIN — LEVOFLOXACIN SCH MLS/HR: 5 INJECTION, SOLUTION INTRAVENOUS at 14:55

## 2022-09-20 RX ADMIN — LOSARTAN POTASSIUM SCH: 50 TABLET, FILM COATED ORAL at 13:41

## 2022-09-20 RX ADMIN — ALBUTEROL SULFATE SCH PUFF: 90 AEROSOL, METERED RESPIRATORY (INHALATION) at 15:28

## 2022-09-20 RX ADMIN — ALBUTEROL SULFATE SCH PUFF: 90 AEROSOL, METERED RESPIRATORY (INHALATION) at 11:11

## 2022-09-20 RX ADMIN — METRONIDAZOLE SCH MLS/HR: 500 INJECTION, SOLUTION INTRAVENOUS at 18:38

## 2022-09-20 RX ADMIN — THERA TABS SCH: TAB at 13:42

## 2022-09-20 RX ADMIN — PAROXETINE HYDROCHLORIDE SCH: 20 TABLET, FILM COATED ORAL at 13:41

## 2022-09-20 RX ADMIN — ENOXAPARIN SODIUM SCH MG: 100 INJECTION SUBCUTANEOUS at 21:40

## 2022-09-20 RX ADMIN — Medication SCH: at 13:49

## 2022-09-20 RX ADMIN — METOPROLOL SUCCINATE SCH MG: 50 TABLET, EXTENDED RELEASE ORAL at 21:26

## 2022-09-20 RX ADMIN — PANTOPRAZOLE SODIUM SCH: 40 TABLET, DELAYED RELEASE ORAL at 13:42

## 2022-09-20 RX ADMIN — ALBUTEROL SULFATE SCH PUFF: 90 AEROSOL, METERED RESPIRATORY (INHALATION) at 06:36

## 2022-09-20 RX ADMIN — METRONIDAZOLE SCH: 500 INJECTION, SOLUTION INTRAVENOUS at 00:28

## 2022-09-20 RX ADMIN — METOPROLOL SUCCINATE SCH MG: 25 TABLET, EXTENDED RELEASE ORAL at 21:27

## 2022-09-20 RX ADMIN — ONDANSETRON PRN MG: 2 INJECTION, SOLUTION INTRAMUSCULAR; INTRAVENOUS at 05:37

## 2022-09-20 RX ADMIN — FAMOTIDINE SCH: 20 TABLET, FILM COATED ORAL at 13:41

## 2022-09-20 RX ADMIN — ONDANSETRON PRN MG: 2 INJECTION, SOLUTION INTRAMUSCULAR; INTRAVENOUS at 09:30

## 2022-09-20 RX ADMIN — Medication SCH MG: at 21:27

## 2022-09-20 RX ADMIN — TAMSULOSIN HYDROCHLORIDE SCH MG: 0.4 CAPSULE ORAL at 21:25

## 2022-09-20 RX ADMIN — AMLODIPINE BESYLATE SCH: 5 TABLET ORAL at 13:41

## 2022-09-20 RX ADMIN — Medication SCH: at 13:41

## 2022-09-20 RX ADMIN — METOPROLOL SUCCINATE SCH: 25 TABLET, EXTENDED RELEASE ORAL at 13:42

## 2022-09-20 RX ADMIN — SIMVASTATIN SCH MG: 20 TABLET, FILM COATED ORAL at 21:26

## 2022-09-20 RX ADMIN — CYANOCOBALAMIN TAB 500 MCG SCH: 500 TAB at 13:42

## 2022-09-20 RX ADMIN — METRONIDAZOLE SCH MLS/HR: 500 INJECTION, SOLUTION INTRAVENOUS at 05:38

## 2022-09-20 RX ADMIN — ZOLPIDEM TARTRATE PRN MG: 5 TABLET ORAL at 21:27

## 2022-09-20 RX ADMIN — ALBUTEROL SULFATE SCH PUFF: 90 AEROSOL, METERED RESPIRATORY (INHALATION) at 19:21

## 2022-09-20 NOTE — XRAY
Indication: NG tube placement.



Comparison: September 15, 2022



Portable chest demonstrates new NG tube tip mid to distal distal esophagus.

Recommend advancement at least 15 cm.



Remaining heart and lungs unremarkable again with left pacemaker.  No new

cardiopulmonary abnormalities.

## 2022-09-20 NOTE — PCM.HP
History of Present Illness





- Chief Complaint


Chief Complaint: abdominal pain, nausea and vomiting for 1 day


History of Present Illness: 


 is a 77 year old male. presents to the emergency department from 

home with left-sided abdominal pain which radiates into his left flank that 

began this afternoon.  He did have a small bowel movement today but it was not 

much of 1 per his report.  He has had mild nausea but no vomiting or diarrhea.  

Patient has a history of arrhythmias, hypertension, peripheral neuropathy, COPD,

pulmonary embolism, diabetes type 2, pacemaker defibrillator placement and 

prostate issues.  Patient denies chest pain and he denies shortness of breath.


Timing/Duration: today


Activities at Onset: none


Quality: dullness, pressure


Abdominal Pain Onset Location: [facility], [facility]


Pain Radiation: flank (Left)


Severity of Pain-Max: mild (To moderate)


Severity of Pain-Current: mild (To moderate)


Associated Symptoms: nausea, No chest pain, No diarrhea, No fever/chills, No 

shortness of breath, No vomiting


Previous symptoms: no prior history








- Review of Systems


Constitutional: No Fever, No Chills


Eyes: No Symptoms


Ears, Nose, & Throat: No Symptoms


Respiratory: No Cough, No Short Of Breath


Cardiac: No Chest Pain, No Edema, No Syncope


Abdominal/Gastrointestinal: Abdominal Pain, Nausea, Vomiting, No Diarrhea


Genitourinary Symptoms: No Dysuria


Musculoskeletal: No Back Pain, No Neck Pain


Skin: No Rash


Neurological: No Dizziness, No Focal Weakness, No Sensory Changes


Psychological: No Symptoms


Endocrine: No Symptoms


Hematologic/Lymphatic: No Symptoms


Immunological/Allergic: No Symptoms





Medications & Allergies


Home Medications: 


                              Home Medication List





Losartan Potassium 50 mg PO DAILY 01/17/16 [History Confirmed 09/19/22]


Metformin HCl 500 mg*** [Glucophage 500 MG***] 500 mg PO HS 01/17/16 [History 

Confirmed 09/19/22]


Metoprolol Succinate 75 mg PO BID 01/17/16 [History Confirmed 09/19/22]


Multivitamin [Multi-Vitamin Daily] 1 each PO DAILY 01/17/16 [History Confirmed 

09/19/22]


Nabumetone [Relafen] 500 mg PO BID 01/17/16 [History Confirmed 09/19/22]


Pyridoxine HCl (Vitamin B6) [Vitamin B-6] 100 mg PO DAILY 01/17/16 [History 

Confirmed 09/19/22]


Senna 8.6 mg*** [Senokot 8.6 mg***] 8.6 mg PO DAILY PRN PRN 01/17/16 [History 

Confirmed 09/19/22]


Tamsulosin HCl 0.4 mg*** [Flomax 0.4 MG***] 0.8 mg PO HS 01/17/16 [History 

Confirmed 09/19/22]


Vitamin E 400 Units*** [Vitamin E 400 UNIT SOFTGEL***] 400 unit PO DAILY 

01/17/16 [History Confirmed 09/19/22]


Omeprazole 20 mg PO DAILY 02/29/16 [History Confirmed 09/19/22]


Apixaban [Eliquis] 5 mg PO BID 06/26/20 [History Confirmed 09/19/22]


Famotidine 20 mg*** [Pepcid 20 MG***] 20 mg PO DAILY 07/03/20 [History Confirmed

 09/19/22]


Cyanocobalamin (Vitamin B-12) [Vitamin B12] 1,000 mcg PO DAILY 02/02/21 [History

 Confirmed 09/19/22]


PARoxetine HCL [Paroxetine HCl] 20 mg PO DAILY 02/02/21 [History Confirmed 

09/19/22]


Rosuvastatin Calcium 10 mg PO HS 02/02/21 [History Confirmed 09/19/22]


Ubidecarenone [Co Q-10] 200 mg PO DAILY 02/02/21 [History Confirmed 09/19/22]


Albuterol Sulfate [Proair Hfa] 8.5 gm IH Q4-6HPRN PRN 09/19/22 [History 

Confirmed 09/19/22]


Amlodipine Besylate [Norvasc] 2.5 mg PO DAILY 09/19/22 [History Confirmed 

09/19/22]


Omega-3/Dha/Epa/Fish Oil [Fish Oil 1,000 mg Softgel] 4,000 mg PO BID 09/19/22 

[History Confirmed 09/19/22]








Allergies/Adverse Reactions: 


                                    Allergies











Allergy/AdvReac Type Severity Reaction Status Date / Time


 


Sulfa (Sulfonamide Allergy Severe  Verified 09/19/22 19:49





Antibiotics)     


 


codeine Allergy Intermediate Nausea and Verified 09/19/22 19:49





   Vomiting  


 


cephalexin [From Keflex] Allergy Unknown  Verified 09/19/22 19:49














- Past Medical History


Past Medical History: Yes


Neurological History: Peripheral Neuropathy


ENT History: No Pertinent History


Cardiac History: Arrhythmia, Hypertension


Respiratory History: COPD, Pulmonary Embolism, Sleep Apnea


Endocrine Medical History: Diabetes Type II


Musculoskelatal History: Osteoarthritis


GI Medical History: Gallbladder Disease


 History: No Pertinent History


Pyscho-Social History: Depression


Male Reproductive Disorders: Prostate Problems


Comment: SURGERY ON L QUAD AFTER A TEAR, CODED AT REGIONAL DUE TO A BLOOD CLOT. 

RECENT BRAIN SCAN BY DR. SAENZ, WAS PASSING OUT DUE TO SYCOPE. ABLASION OF 

HEART TO TX AFIB 1 MONTH AGO. PACEMAKER, DEFIBRILLATOR.





- Past Surgical History


Past Surgical History: Yes


Neuro Surgical History: No Pertinent History


Cardiac History: Pacemaker


Respiratory Surgery: No Pertinent History


GI Surgical History: Cholecystectomy


Genitourinary Surgical Hx: No Pertinent History


Musculskeletal Surgical Hx: Orthopedic Surgery


Male Surgical History: Other


Other Surgical History: dental surgical extractions,left knee feb after fall on 

ice,tear of tendons repaired.





- Social History


Smoking Status: Former smoker


How long have you smoked: 42 years


Exposure to second hand smoke: No


Alcohol: None


Drug Use: none


Significant Family History: diabetes, hypertension





- Physical Exam


Vital Signs: 


                               Vital Signs - 24 hr











  Temp Pulse Resp BP BP Pulse Ox


 


 09/20/22 16:00  97.7 F  88  16  149/73   97


 


 09/20/22 15:28   102 H  20    95


 


 09/20/22 11:48  98.0 F  99 H  16  186/85   94 L


 


 09/20/22 11:11   102 H  20    94 L


 


 09/20/22 07:16  98.6 F  98 H  16  163/76   93 L


 


 09/20/22 06:52   102 H  20    92 L


 


 09/20/22 04:00  97.5 F  94 H  18  155/76   90 L


 


 09/20/22 00:00  98.6 F  89  20  146/80   92 L


 


 09/19/22 23:37  98.6 F  89  20  146/80   92 L


 


 09/19/22 23:00   85  20   124/91  96


 


 09/19/22 22:34   85  20   162/87  98


 


 09/19/22 22:25   86  20    96


 


 09/19/22 22:21       97


 


 09/19/22 21:00   80  17   175/82  97


 


 09/19/22 20:31   78  18   155/78  97


 


 09/19/22 19:33  97.1 F  60  20   175/85  97











General Appearance: no apparent distress, alert


Neurologic Exam: alert, oriented x 3, cooperative, normal mood/affect, nml 

cerebellar function, nml station & gait, sensation nml, No motor deficits


Eye Exam: PERRL/EOMI, eyes nml inspection


Ears, Nose, Throat Exam: normal ENT inspection, TMs normal, pharynx normal, 

moist mucous membranes


Neck Exam: normal inspection, non-tender, supple, full range of motion


Respiratory Exam: normal breath sounds, lungs clear, No respiratory distress


Cardiovascular Exam: regular rate/rhythm, normal heart sounds, normal peripheral

pulses


Gastrointestinal/Abdomen Exam: distention, other (hypoactive bowel sounds), No 

tenderness, No mass


Back Exam: normal inspection, normal range of motion, No CVA tenderness, No 

vertebral tenderness


Extremity Exam: normal inspection, normal range of motion, pelvis stable


Skin Exam: normal color, warm, dry, No rash


Lymphatic Exam: No adenopathy





Results





- Labs


Lab/Micro Results: 


                            Lab Results-Last 24 Hours











  09/19/22 09/19/22 09/19/22 Range/Units





  20:36 20:36 20:38 


 


WBC  14.0 H    (4.0-10.5)  x10^3/uL


 


RBC  4.76    (4.1-5.6)  x10^6/uL


 


Hgb  13.0    (12.5-18.0)  g/dL


 


Hct  42.6    (42-50)  %


 


MCV  89.5    ()  fL


 


MCH  27.3    (26-32)  pg


 


MCHC  30.5 L    (32-36)  g/dL


 


RDW  14.0    (11.5-14.0)  %


 


Plt Count  303    (150-450)  x10^3/uL


 


MPV  11.9 H    (7.5-11.0)  fL


 


Gran %  84.0 H    (36.0-66.0)  %


 


Immature Gran % (Auto)  0.5 H    (0.00-0.4)  %


 


Nucleat RBC Rel Count  0.0    (0.00-0.1)  %


 


Eos # (Auto)  0.28    (0-0.5)  x10^3/uL


 


Immature Gran # (Auto)  0.07 H    (0.00-0.03)  x10^3u/L


 


Absolute Lymphs (auto)  1.05    (1.0-4.6)  x10^3/uL


 


Absolute Monos (auto)  0.74    (0.0-1.3)  x10^3/uL


 


Absolute Nucleated RBC  0.00    (0.00-0.01)  x10^3u/L


 


Lymphocytes %  7.5 L    (24.0-44.0)  %


 


Monocytes %  5.3    (0.0-12.0)  %


 


Eosinophils %  2.0    (0.00-5.0)  %


 


Basophils %  0.7    (0.0-0.4)  %


 


Absolute Granulocytes  11.72 H    (1.4-6.9)  x10^3/uL


 


Basophils #  0.10    (0-0.4)  x10^3/uL


 


Sodium   140   (137-145)  mmol/L


 


Potassium   4.2   (3.5-5.1)  mmol/L


 


Chloride   103   ()  mmol/L


 


Carbon Dioxide   25   (22-30)  mmol/L


 


Anion Gap   15.9 H   (5-15)  MEQ/L


 


BUN   21 H   (9-20)  mg/dL


 


Creatinine   1.30 H   (0.66-1.25)  mg/dL


 


Estimated GFR   57.0   ML/MIN


 


Glucose   235 H   ()  mg/dL


 


Lactic Acid    3.1 H  (0.4-2.0)  


 


Calcium   9.2   (8.4-10.2)  mg/dL


 


Total Bilirubin   1.20   (0.2-1.3)  mg/dL


 


AST   37   (17-59)  U/L


 


ALT   30   (0-50)  U/L


 


Alkaline Phosphatase   96   ()  U/L


 


Serum Total Protein   7.7   (6.3-8.2)  g/dL


 


Albumin   4.6   (3.5-5.0)  g/dL


 


Amylase   122 H   ()  U/L


 


Lipase   549 H   ()  U/L


 


Urinalys Dipstick Clnc     


 


Urine Color     (YELLOW)  


 


Urine Appearance     (CLEAR)  


 


Urine pH     (5-6)  


 


Ur Specific Gravity     (1.005-1.025)  


 


POC Urine Protein Conf     (Negative)  


 


Urine Ketones     (NEGATIVE)  


 


Urine Nitrite     (NEGATIVE)  


 


Urine Bilirubin     (NEGATIVE)  


 


Urine Urobilinogen     (0-1)  mg/dL


 


Urine Leukocytes     (NEGATIVE)  


 


Urine WBC (Auto)     (0-5)  /HPF


 


Urine RBC (Auto)     (0-2)  /HPF


 


U Epithel Cells (Auto)     (FEW)  /HPF


 


Urine Bacteria (Auto)     


 


Urine RBC     (0-5)  Jose F/ul


 


Urine Mucus (Auto)     (NEGATIVE)  /HPF


 


Ur Culture Indicated?     


 


Urine Glucose     (NEGATIVE)  mg/dL


 


Influenza Type A Ag     (NEGATIVE)  


 


Influenza Type B Ag     (NEGATIVE)  


 


RSV (PCR)     (Negative)  


 


SARS-CoV-2 (PCR)     (NEGATIVE)  


 


Slides for Path Review     














  09/19/22 09/19/22 09/19/22 Range/Units





  22:31 22:44 Unknown 


 


WBC     (4.0-10.5)  x10^3/uL


 


RBC     (4.1-5.6)  x10^6/uL


 


Hgb     (12.5-18.0)  g/dL


 


Hct     (42-50)  %


 


MCV     ()  fL


 


MCH     (26-32)  pg


 


MCHC     (32-36)  g/dL


 


RDW     (11.5-14.0)  %


 


Plt Count     (150-450)  x10^3/uL


 


MPV     (7.5-11.0)  fL


 


Gran %     (36.0-66.0)  %


 


Immature Gran % (Auto)     (0.00-0.4)  %


 


Nucleat RBC Rel Count     (0.00-0.1)  %


 


Eos # (Auto)     (0-0.5)  x10^3/uL


 


Immature Gran # (Auto)     (0.00-0.03)  x10^3u/L


 


Absolute Lymphs (auto)     (1.0-4.6)  x10^3/uL


 


Absolute Monos (auto)     (0.0-1.3)  x10^3/uL


 


Absolute Nucleated RBC     (0.00-0.01)  x10^3u/L


 


Lymphocytes %     (24.0-44.0)  %


 


Monocytes %     (0.0-12.0)  %


 


Eosinophils %     (0.00-5.0)  %


 


Basophils %     (0.0-0.4)  %


 


Absolute Granulocytes     (1.4-6.9)  x10^3/uL


 


Basophils #     (0-0.4)  x10^3/uL


 


Sodium     (137-145)  mmol/L


 


Potassium     (3.5-5.1)  mmol/L


 


Chloride     ()  mmol/L


 


Carbon Dioxide     (22-30)  mmol/L


 


Anion Gap     (5-15)  MEQ/L


 


BUN     (9-20)  mg/dL


 


Creatinine     (0.66-1.25)  mg/dL


 


Estimated GFR     ML/MIN


 


Glucose     ()  mg/dL


 


Lactic Acid   3.5 H   (0.4-2.0)  


 


Calcium     (8.4-10.2)  mg/dL


 


Total Bilirubin     (0.2-1.3)  mg/dL


 


AST     (17-59)  U/L


 


ALT     (0-50)  U/L


 


Alkaline Phosphatase     ()  U/L


 


Serum Total Protein     (6.3-8.2)  g/dL


 


Albumin     (3.5-5.0)  g/dL


 


Amylase     ()  U/L


 


Lipase     ()  U/L


 


Urinalys Dipstick Clnc  MAIN LAB    


 


Urine Color  YELLOW    (YELLOW)  


 


Urine Appearance  CLEAR    (CLEAR)  


 


Urine pH  5.5    (5-6)  


 


Ur Specific Gravity  1.025    (1.005-1.025)  


 


POC Urine Protein Conf  NEGATIVE    (Negative)  


 


Urine Ketones  SMALL-15    (NEGATIVE)  


 


Urine Nitrite  NEGATIVE    (NEGATIVE)  


 


Urine Bilirubin  NEGATIVE    (NEGATIVE)  


 


Urine Urobilinogen  0.2    (0-1)  mg/dL


 


Urine Leukocytes  NEGATIVE    (NEGATIVE)  


 


Urine WBC (Auto)  0-2    (0-5)  /HPF


 


Urine RBC (Auto)  0-2    (0-2)  /HPF


 


U Epithel Cells (Auto)  RARE    (FEW)  /HPF


 


Urine Bacteria (Auto)  Not Reportable    


 


Urine RBC  NEGATIVE    (0-5)  Jose F/ul


 


Urine Mucus (Auto)  SLIGHT    (NEGATIVE)  /HPF


 


Ur Culture Indicated?  NO    


 


Urine Glucose  >=1000    (NEGATIVE)  mg/dL


 


Influenza Type A Ag    NEGATIVE  (NEGATIVE)  


 


Influenza Type B Ag    NEGATIVE  (NEGATIVE)  


 


RSV (PCR)    NEGATIVE  (Negative)  


 


SARS-CoV-2 (PCR)    NEGATIVE  (NEGATIVE)  


 


Slides for Path Review     














  09/20/22 09/20/22 09/20/22 Range/Units





  04:30 04:30 04:30 


 


WBC  19.4 H    (4.0-10.5)  x10^3/uL


 


RBC  4.60    (4.1-5.6)  x10^6/uL


 


Hgb  12.8    (12.5-18.0)  g/dL


 


Hct  40.6 L    (42-50)  %


 


MCV  88.3    ()  fL


 


MCH  27.8    (26-32)  pg


 


MCHC  31.5 L    (32-36)  g/dL


 


RDW  14.3 H    (11.5-14.0)  %


 


Plt Count  312    (150-450)  x10^3/uL


 


MPV  11.3 H    (7.5-11.0)  fL


 


Gran %  90.1 H    (36.0-66.0)  %


 


Immature Gran % (Auto)  0.5 H    (0.00-0.4)  %


 


Nucleat RBC Rel Count  0.0    (0.00-0.1)  %


 


Eos # (Auto)  0.03    (0-0.5)  x10^3/uL


 


Immature Gran # (Auto)  0.09 H    (0.00-0.03)  x10^3u/L


 


Absolute Lymphs (auto)  0.40 L    (1.0-4.6)  x10^3/uL


 


Absolute Monos (auto)  1.33 H    (0.0-1.3)  x10^3/uL


 


Absolute Nucleated RBC  0.00    (0.00-0.01)  x10^3u/L


 


Lymphocytes %  2.1 L    (24.0-44.0)  %


 


Monocytes %  6.8    (0.0-12.0)  %


 


Eosinophils %  0.2    (0.00-5.0)  %


 


Basophils %  0.3    (0.0-0.4)  %


 


Absolute Granulocytes  17.52 H    (1.4-6.9)  x10^3/uL


 


Basophils #  0.06    (0-0.4)  x10^3/uL


 


Sodium   140   (137-145)  mmol/L


 


Potassium   5.1  D   (3.5-5.1)  mmol/L


 


Chloride   104   ()  mmol/L


 


Carbon Dioxide   22   (22-30)  mmol/L


 


Anion Gap   18.5 H   (5-15)  MEQ/L


 


BUN   22 H   (9-20)  mg/dL


 


Creatinine   1.37 H   (0.66-1.25)  mg/dL


 


Estimated GFR   53.6   ML/MIN


 


Glucose   285 H   ()  mg/dL


 


Lactic Acid     (0.4-2.0)  


 


Calcium   8.7   (8.4-10.2)  mg/dL


 


Total Bilirubin   1.60 H   (0.2-1.3)  mg/dL


 


AST   34   (17-59)  U/L


 


ALT   30   (0-50)  U/L


 


Alkaline Phosphatase   90   ()  U/L


 


Serum Total Protein   7.3   (6.3-8.2)  g/dL


 


Albumin   4.5   (3.5-5.0)  g/dL


 


Amylase    95  ()  U/L


 


Lipase    194  ()  U/L


 


Urinalys Dipstick Clnc     


 


Urine Color     (YELLOW)  


 


Urine Appearance     (CLEAR)  


 


Urine pH     (5-6)  


 


Ur Specific Gravity     (1.005-1.025)  


 


POC Urine Protein Conf     (Negative)  


 


Urine Ketones     (NEGATIVE)  


 


Urine Nitrite     (NEGATIVE)  


 


Urine Bilirubin     (NEGATIVE)  


 


Urine Urobilinogen     (0-1)  mg/dL


 


Urine Leukocytes     (NEGATIVE)  


 


Urine WBC (Auto)     (0-5)  /HPF


 


Urine RBC (Auto)     (0-2)  /HPF


 


U Epithel Cells (Auto)     (FEW)  /HPF


 


Urine Bacteria (Auto)     


 


Urine RBC     (0-5)  Jose F/ul


 


Urine Mucus (Auto)     (NEGATIVE)  /HPF


 


Ur Culture Indicated?     


 


Urine Glucose     (NEGATIVE)  mg/dL


 


Influenza Type A Ag     (NEGATIVE)  


 


Influenza Type B Ag     (NEGATIVE)  


 


RSV (PCR)     (Negative)  


 


SARS-CoV-2 (PCR)     (NEGATIVE)  


 


Slides for Path Review  YES    














  09/20/22 Range/Units





  04:30 


 


WBC   (4.0-10.5)  x10^3/uL


 


RBC   (4.1-5.6)  x10^6/uL


 


Hgb   (12.5-18.0)  g/dL


 


Hct   (42-50)  %


 


MCV   ()  fL


 


MCH   (26-32)  pg


 


MCHC   (32-36)  g/dL


 


RDW   (11.5-14.0)  %


 


Plt Count   (150-450)  x10^3/uL


 


MPV   (7.5-11.0)  fL


 


Gran %   (36.0-66.0)  %


 


Immature Gran % (Auto)   (0.00-0.4)  %


 


Nucleat RBC Rel Count   (0.00-0.1)  %


 


Eos # (Auto)   (0-0.5)  x10^3/uL


 


Immature Gran # (Auto)   (0.00-0.03)  x10^3u/L


 


Absolute Lymphs (auto)   (1.0-4.6)  x10^3/uL


 


Absolute Monos (auto)   (0.0-1.3)  x10^3/uL


 


Absolute Nucleated RBC   (0.00-0.01)  x10^3u/L


 


Lymphocytes %   (24.0-44.0)  %


 


Monocytes %   (0.0-12.0)  %


 


Eosinophils %   (0.00-5.0)  %


 


Basophils %   (0.0-0.4)  %


 


Absolute Granulocytes   (1.4-6.9)  x10^3/uL


 


Basophils #   (0-0.4)  x10^3/uL


 


Sodium   (137-145)  mmol/L


 


Potassium   (3.5-5.1)  mmol/L


 


Chloride   ()  mmol/L


 


Carbon Dioxide   (22-30)  mmol/L


 


Anion Gap   (5-15)  MEQ/L


 


BUN   (9-20)  mg/dL


 


Creatinine   (0.66-1.25)  mg/dL


 


Estimated GFR   ML/MIN


 


Glucose   ()  mg/dL


 


Lactic Acid  3.8 H  (0.4-2.0)  


 


Calcium   (8.4-10.2)  mg/dL


 


Total Bilirubin   (0.2-1.3)  mg/dL


 


AST   (17-59)  U/L


 


ALT   (0-50)  U/L


 


Alkaline Phosphatase   ()  U/L


 


Serum Total Protein   (6.3-8.2)  g/dL


 


Albumin   (3.5-5.0)  g/dL


 


Amylase   ()  U/L


 


Lipase   ()  U/L


 


Urinalys Dipstick Clnc   


 


Urine Color   (YELLOW)  


 


Urine Appearance   (CLEAR)  


 


Urine pH   (5-6)  


 


Ur Specific Gravity   (1.005-1.025)  


 


POC Urine Protein Conf   (Negative)  


 


Urine Ketones   (NEGATIVE)  


 


Urine Nitrite   (NEGATIVE)  


 


Urine Bilirubin   (NEGATIVE)  


 


Urine Urobilinogen   (0-1)  mg/dL


 


Urine Leukocytes   (NEGATIVE)  


 


Urine WBC (Auto)   (0-5)  /HPF


 


Urine RBC (Auto)   (0-2)  /HPF


 


U Epithel Cells (Auto)   (FEW)  /HPF


 


Urine Bacteria (Auto)   


 


Urine RBC   (0-5)  Jose F/ul


 


Urine Mucus (Auto)   (NEGATIVE)  /HPF


 


Ur Culture Indicated?   


 


Urine Glucose   (NEGATIVE)  mg/dL


 


Influenza Type A Ag   (NEGATIVE)  


 


Influenza Type B Ag   (NEGATIVE)  


 


RSV (PCR)   (Negative)  


 


SARS-CoV-2 (PCR)   (NEGATIVE)  


 


Slides for Path Review   














- Radiology Impressions


Radiology Exams & Impressions: 


                              Radiology Procedures











 Category Date Time Status


 


 ABDOMEN AND PELVIS W/0 CONTRAS [CT] Stat Exams  09/19/22 20:30 Completed


 


 CHEST 1 VIEW (PORTABLE) Urgent Exams  09/20/22 00:00 Completed








CT/ABDOMEN AND PELVIS W/0 CONTRAS


Indication: Abdomen/flank pain.  Nausea and constipation.





Multiple contiguous axial images obtained through the abdomen and pelvis


without contrast.





Comparison: None





Lung bases hyperinflated with minimal dependent atelectasis.  No infiltrate or


effusion.  Heart not enlarged.





Stomach is distended with food/fluid.  Noncontrasted stomach and bowel loops


appear nonobstructed.  Small bowel loops are mildly uniformly fluid distended


with fluid leveling, ileus versus gastroenteritis.  Normal air-filled


appendix.  Colon demonstrates normal bowel gas with little scattered colonic


fecal debris.  No free fluid/air.  Incidental 3.6 cm left lower renal


exophytic cyst, tiny splenic calcified granulomas, and cholecystectomy clips.





Remaining liver, pancreas, spleen, adrenal glands, kidneys, ureters, and


bladder are unremarkable for noncontrast exam.  Mild scattered aortoiliac


calcifications without AAA.





Osseous structures intact with mild osteopenia, mild/moderate degenerative


changes throughout the thoracolumbar spine, and mild dextroscoliosis centered


at L3.  Small fatty supraumbilical ventral hernia.





Impression:


1.  Fluid distended stomach and small bowel loops with fluid leveling, ileus


versus gastroenteritis.


2.  Incidental left renal cyst, arteriosclerotic disease, chronic bony


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





- Other Procedures and Tests


                               Respiratory Therapy





09/19/22 23:52


Respiratory Therapy Assessment DAILY 





09/20/22 00:29


BiPap/CPAP ROUTINE 





09/20/22 11:24


Oxygen Nasal Cannula 2 lpm 














Assessment/Plan


(1) Paralytic ileus


Current Visit: Yes   Status: Acute   


Assessment & Plan: 


                                 Chief Complaint





Diagnosis                        ILEUS





                                    Allergies











Allergy/AdvReac Type Severity Reaction Status Date / Time


 


Sulfa (Sulfonamide Allergy Severe  Verified 09/19/22 19:49





Antibiotics)     


 


codeine Allergy Intermediate Nausea and Verified 09/19/22 19:49





   Vomiting  


 


cephalexin [From Keflex] Allergy Unknown  Verified 09/19/22 19:49








                           Vital Signs (Last 24 hours)











  Temp Pulse Resp BP BP Pulse Ox


 


 09/20/22 16:00  97.7 F  88  16  149/73   97


 


 09/20/22 15:28   102 H  20    95


 


 09/20/22 11:48  98.0 F  99 H  16  186/85   94 L


 


 09/20/22 11:11   102 H  20    94 L


 


 09/20/22 07:16  98.6 F  98 H  16  163/76   93 L


 


 09/20/22 06:52   102 H  20    92 L


 


 09/20/22 04:00  97.5 F  94 H  18  155/76   90 L


 


 09/20/22 00:00  98.6 F  89  20  146/80   92 L


 


 09/19/22 23:37  98.6 F  89  20  146/80   92 L


 


 09/19/22 23:00   85  20   124/91  96


 


 09/19/22 22:34   85  20   162/87  98


 


 09/19/22 22:25   86  20    96


 


 09/19/22 22:21       97


 


 09/19/22 21:00   80  17   175/82  97


 


 09/19/22 20:31   78  18   155/78  97


 


 09/19/22 19:33  97.1 F  60  20   175/85  97








                                Home Medications











 Medication  Instructions  Recorded  Confirmed  Last Taken  Type


 


Albuterol Sulfate [Proair Hfa] 8.5 gm IH Q4-6HPRN PRN 09/19/22 09/19/22 Unknown 

History


 


Amlodipine Besylate [Norvasc] 2.5 mg PO DAILY 09/19/22 09/19/22 09/19/22 09:00 

History


 


Omega-3/Dha/Epa/Fish Oil [Fish Oil 4,000 mg PO BID 09/19/22 09/19/22 09/19/22 

18:00 History





1,000 mg Softgel]     








                               Current Medications











Generic Name Dose Route Start Last Admin





  Trade Name Cyndi  PRN Reason Stop Dose Admin


 


Acetaminophen  650 mg  09/19/22 23:31 





  Acetaminophen 325 Mg Tablet  PO  10/19/22 23:30 





  Q4H PRN PRN  





  PAIN, FEVER, HEADACHE  


 


Albuterol Sulfate  2 puff  09/20/22 11:36  09/20/22 15:28





  Albuterol Common Canister Inhaler  IH  10/20/22 06:59  2 puff





  QIDRT JOYCELYN   Administration


 


Amlodipine Besylate  2.5 mg  09/20/22 13:00  09/20/22 13:41





  Amlodipine Besylate 5 Mg Tablet  PO  10/20/22 12:59  Not Given





  DAILY JOYCELYN  


 


Apixaban  5 mg  09/20/22 13:00  09/20/22 13:41





  Apixaban 2.5 Mg Tablet  PO  10/20/22 12:59  Not Given





  BID JOYCELYN  


 


Cyanocobalamin  1,000 mcg  09/20/22 13:00  09/20/22 13:42





  Cyanocobalamin 500 Mcg Tablet  PO  10/20/22 12:59  Not Given





  DAILY JOYCELYN  


 


Famotidine  20 mg  09/20/22 13:00  09/20/22 13:41





  Famotidine 20 Mg Tablet  PO  10/20/22 12:59  Not Given





  DAILY JOYCELYN  


 


Fish Oil  4,000 mg  09/20/22 13:00  09/20/22 13:41





  Omega-3 Fatty Acids/Fish Oil 1000 Mg Capsule  PO  10/20/22 12:59  Not Given





  BID JOYCELYN  


 


Metronidazole  500 mg in 100 mls @ 200 mls/hr  09/20/22 00:00  09/20/22 11:42





  Flagyl 500 Mg Ivpb  IV  10/20/22 00:00  200 mls/hr





  Q6HT JOYCELYN   Administration


 


Lactated Ringer's  1,000 mls @ 150 mls/hr  09/20/22 05:30  09/20/22 05:37





  Lactated Ringers  IV  10/20/22 05:29  150 mls/hr





  .Q6H40M JOYCELYN   Administration


 


Promethazine HCl 25 mg/ Sodium  101 mls @ 200 mls/hr  09/20/22 12:03  09/20/22 

12:41





  Chloride  IV  10/20/22 12:02  200 mls/hr





  Q4H PRN PRN   Administration





  UNCONTROLLED NAUSEA  


 


Levofloxacin/Dextrose  500 mg in 100 mls @ 100 mls/hr  09/20/22 14:00  09/20/22 

14:55





  Levofloxacin 500mg/100ml D5w  IV  10/20/22 13:59  100 mls/hr





  DAILY JOYCELYN   Administration


 


Losartan Potassium  50 mg  09/20/22 13:00  09/20/22 13:41





  Losartan Potassium 50 Mg Tablet  PO  10/20/22 12:59  Not Given





  DAILY JOYCELYN  


 


Metformin HCl  500 mg  09/20/22 22:00 





  Metformin Hcl 500 Mg Tablet  PO  10/20/22 21:59 





  HS JOYCELYN  


 


Metoprolol Succinate  50 mg  09/20/22 13:00  09/20/22 13:42





  Metoprolol Succinate 50 Mg Tablet.Sa  PO  10/20/22 12:59  Not Given





  BID JOYCELYN  


 


Metoprolol Succinate  25 mg  09/20/22 13:00  09/20/22 13:42





  Metoprolol Succinate 25 Mg Xl Tab  PO  10/20/22 12:59  Not Given





  BID [facility]  


 


Miscellaneous Information  1 each  09/20/22 13:00 





  Medication Intervention 1 Each Each    10/20/22 12:59 





  .RN TO CHECK [facility]  


 


Miscellaneous Information  1 each  09/20/22 13:00 





  Medication Intervention 1 Each Each    10/20/22 12:59 





  .RN TO CHECK JOYCELYN  


 


Morphine Sulfate  4 mg  09/19/22 23:31  09/20/22 01:55





  Morphine Sulfate 4 Mg/Ml Injection  IV  09/24/22 23:30  4 mg





  Q6H PRN PRN   Administration





  PAIN  


 


Multivitamins Therapeutic  1 tab  09/20/22 13:00  09/20/22 13:42





  Multivitamins,Therapeutic 1 Tab Tab  PO  10/20/22 12:59  Not Given





  DAILY JOYCELYN  


 


Ondansetron HCl  4 mg  09/20/22 05:25  09/20/22 09:30





  Ondansetron Hcl 4 Mg/2 Ml Vial  IV  10/20/22 05:22  4 mg





  Q4H PRN PRN   Administration





  NAUSEA/VOMITING  


 


Pantoprazole Sodium  40 mg  09/20/22 13:00  09/20/22 13:42





  Protonix (Pantoprazole) 40 Mg Tablet  PO  10/20/22 12:59  Not Given





  DAILY [facility]  


 


Paroxetine HCl  20 mg  09/20/22 13:00  09/20/22 13:41





  Paroxetine Hcl 20 Mg Tablet  PO  10/20/22 12:59  Not Given





  DAILY [facility]  


 


Pyridoxine HCl  100 mg  09/20/22 13:00  09/20/22 13:49





  Pyridoxine Hcl 100 Mg Tablet  PO  10/20/22 12:59  Not Given





  DAILY [facility]  


 


Senna  8.6 mg  09/20/22 12:29 





  Senna 8.6 Mg Tablet  PO  10/20/22 12:28 





  DAILY PRN PRN  





  [facility]  


 


Simvastatin  20 mg  09/20/22 22:00 





  Simvastatin 20 Mg Tablet  PO  10/20/22 21:59 





  [facility]  


 


Tamsulosin HCl  0.8 mg  09/20/22 22:00 





  Tamsulosin Hcl 0.4 Mg Cap  PO  10/20/22 21:59 





  [facility]  


 


Vitamin E  400 u  09/20/22 13:00  09/20/22 13:49





  Vitamin E 400 Iu Softgel  PO  10/20/22 12:59  Not Given





  DAILY [facility]  














Discontinued Medications














Generic Name Dose Route Start Last Admin





  Trade Name Cyndi  PRN Reason Stop Dose Admin


 


Albuterol Sulfate  4 puff  09/20/22 07:00  09/20/22 11:11





  Albuterol Common Canister Inhaler  IH  10/20/22 06:59  4 puff





  QIDRT JOYCELYN   Administration


 


Sodium Chloride  1,000 mls @ 999 mls/hr  09/19/22 20:59  09/19/22 21:02





  Sodium Chloride 0.9% 1000 Ml  IV  09/19/22 21:59  999 mls/hr





  .Q1H1M STA   Administration


 


Sodium Chloride  Confirm  09/19/22 21:01 





  Sodium Chloride 0.9% 1000 Ml  Administered  09/19/22 21:02 





  Dose  





  1,000 mls @ ud  





  .ROUTE  





  .STK-MED ONE  


 


Metronidazole  500 mg in 100 mls @ 200 mls/hr  09/19/22 22:16  09/19/22 22:35





  Flagyl 500 Mg Ivpb  IV  09/19/22 22:45  200 ml/hr





  STAT STA   200 mls/hr





    Administration


 


Metronidazole  Confirm  09/19/22 22:25 





  Flagyl 500 Mg Ivpb  Administered  09/19/22 22:26 





  Dose  





  500 mg in 100 mls @ ud  





  IV  





  .STK-MED ONE  


 


Sodium Chloride  1,000 mls @ 999 mls/hr  09/19/22 23:02  09/19/22 23:04





  Sodium Chloride 0.9% 1000 Ml  IV  09/20/22 00:02  999 mls/hr





  .Q1H1M STA   Administration


 


Sodium Chloride  Confirm  09/19/22 23:03 





  Sodium Chloride 0.9% 1000 Ml  Administered  09/19/22 23:04 





  Dose  





  1,000 mls @ ud  





  .ROUTE  





  .STK-MED ONE  


 


Sodium Chloride  1,000 mls @ 100 mls/hr  09/19/22 23:31  09/20/22 00:16





  Sodium Chloride 0.45% 1000 Ml  IV  10/19/22 23:30  100 mls/hr





  .Q10H JOYCELYN   Administration


 


Sodium Chloride  Confirm  09/20/22 00:15 





  Sodium Chloride 0.45% 1000 Ml  Administered  09/20/22 00:16 





  Dose  





  1,000 mls @ ud  





  IV  





  .STK-MED ONE  


 


Morphine Sulfate  4 mg  09/19/22 22:12  09/19/22 22:31





  Morphine Sulfate 4 Mg/Ml Injection  IV  09/19/22 22:13  4 mg





  STAT ONE   Administration


 


Morphine Sulfate  Confirm  09/19/22 22:25 





  Morphine Sulfate 4 Mg/Ml Injection  Administered  09/19/22 22:26 





  Dose  





  4 mg  





  .ROUTE  





  .STK-MED ONE  


 


Ondansetron HCl  4 mg  09/19/22 22:12  09/19/22 22:32





  Ondansetron Hcl 4 Mg/2 Ml Vial  IV  09/19/22 22:13  4 mg





  STAT ONE   Administration


 


Ondansetron HCl  Confirm  09/19/22 22:24 





  Ondansetron Hcl 4 Mg/2 Ml Vial  Administered  09/19/22 22:25 





  Dose  





  4 mg  





  .ROUTE  





  .STK-MED ONE  


 


Ondansetron HCl  4 mg  09/19/22 23:31  09/20/22 01:53





  Ondansetron Hcl 4 Mg/2 Ml Vial  IV  10/19/22 23:30  4 mg





  Q6H PRN PRN   Administration





  NAUSEA/VOMITING  


 


Ondansetron HCl  Confirm  09/20/22 01:51 





  Ondansetron Hcl 4 Mg/2 Ml Vial  Administered  09/20/22 01:52 





  Dose  





  4 mg  





  .ROUTE  





  .STK-MED ONE  


 


Pantoprazole Sodium  40 mg  09/19/22 23:31  09/20/22 00:27





  Pantoprazole 40 Mg Vial  IV  10/19/22 23:30  40 mg





  Q24H JOYCELYN   Administration


 


Pantoprazole Sodium  40 mg  09/20/22 22:00 





  Pantoprazole 40 Mg Vial  IV  10/19/22 23:30 





  QPM JOYCELYN  








                         Intake & Output (Last 24 hours)











 09/18/22 09/19/22 09/20/22 09/21/22





 11:59 11:59 11:59 11:59


 


Intake Total   540 0


 


Output Total   1250 2750


 


Balance   -710 -2750


 


Weight   146.1 kg 








                       Laboratory Results (Last 24 hours)











  09/20/22 09/20/22 09/20/22





  04:30 04:30 04:30


 


WBC   


 


RBC   


 


Hgb   


 


Hct   


 


MCV   


 


MCH   


 


MCHC   


 


RDW   


 


Plt Count   


 


MPV   


 


Gran %   


 


Immature Gran % (Auto)   


 


Nucleat RBC Rel Count   


 


Eos # (Auto)   


 


Immature Gran # (Auto)   


 


Absolute Lymphs (auto)   


 


Absolute Monos (auto)   


 


Absolute Nucleated RBC   


 


Lymphocytes %   


 


Monocytes %   


 


Eosinophils %   


 


Basophils %   


 


Absolute Granulocytes   


 


Basophils #   


 


Sodium    140


 


Potassium    5.1  D


 


Chloride    104


 


Carbon Dioxide    22


 


Anion Gap    18.5 H


 


BUN    22 H


 


Creatinine    1.37 H


 


Estimated GFR    53.6


 


Glucose    285 H


 


Lactic Acid  3.8 H  


 


Calcium    8.7


 


Total Bilirubin    1.60 H


 


AST    34


 


ALT    30


 


Alkaline Phosphatase    90


 


Serum Total Protein    7.3


 


Albumin    4.5


 


Amylase   95 


 


Lipase   194 


 


Urinalys Dipstick Clnc   


 


Urine Color   


 


Urine Appearance   


 


Urine pH   


 


Ur Specific Gravity   


 


POC Urine Protein Conf   


 


Urine Ketones   


 


Urine Nitrite   


 


Urine Bilirubin   


 


Urine Urobilinogen   


 


Urine Leukocytes   


 


Urine WBC (Auto)   


 


Urine RBC (Auto)   


 


U Epithel Cells (Auto)   


 


Urine Bacteria (Auto)   


 


Urine RBC   


 


Urine Mucus (Auto)   


 


Ur Culture Indicated?   


 


Urine Glucose   


 


Influenza Type A Ag   


 


Influenza Type B Ag   


 


RSV (PCR)   


 


SARS-CoV-2 (PCR)   


 


Slides for Path Review   














  09/20/22 09/19/22 09/19/22





  04:30 Unknown 22:44


 


WBC  19.4 H  


 


RBC  4.60  


 


Hgb  12.8  


 


Hct  40.6 L  


 


MCV  88.3  


 


MCH  27.8  


 


MCHC  31.5 L  


 


RDW  14.3 H  


 


Plt Count  312  


 


MPV  11.3 H  


 


Gran %  90.1 H  


 


Immature Gran % (Auto)  0.5 H  


 


Nucleat RBC Rel Count  0.0  


 


Eos # (Auto)  0.03  


 


Immature Gran # (Auto)  0.09 H  


 


Absolute Lymphs (auto)  0.40 L  


 


Absolute Monos (auto)  1.33 H  


 


Absolute Nucleated RBC  0.00  


 


Lymphocytes %  2.1 L  


 


Monocytes %  6.8  


 


Eosinophils %  0.2  


 


Basophils %  0.3  


 


Absolute Granulocytes  17.52 H  


 


Basophils #  0.06  


 


Sodium   


 


Potassium   


 


Chloride   


 


Carbon Dioxide   


 


Anion Gap   


 


BUN   


 


Creatinine   


 


Estimated GFR   


 


Glucose   


 


Lactic Acid    3.5 H


 


Calcium   


 


Total Bilirubin   


 


AST   


 


ALT   


 


Alkaline Phosphatase   


 


Serum Total Protein   


 


Albumin   


 


Amylase   


 


Lipase   


 


Urinalys Dipstick Clnc   


 


Urine Color   


 


Urine Appearance   


 


Urine pH   


 


Ur Specific Gravity   


 


POC Urine Protein Conf   


 


Urine Ketones   


 


Urine Nitrite   


 


Urine Bilirubin   


 


Urine Urobilinogen   


 


Urine Leukocytes   


 


Urine WBC (Auto)   


 


Urine RBC (Auto)   


 


U Epithel Cells (Auto)   


 


Urine Bacteria (Auto)   


 


Urine RBC   


 


Urine Mucus (Auto)   


 


Ur Culture Indicated?   


 


Urine Glucose   


 


Influenza Type A Ag   NEGATIVE 


 


Influenza Type B Ag   NEGATIVE 


 


RSV (PCR)   NEGATIVE 


 


SARS-CoV-2 (PCR)   NEGATIVE 


 


Slides for Path Review  YES  














  09/19/22 09/19/22 09/19/22





  22:31 20:38 20:36


 


WBC   


 


RBC   


 


Hgb   


 


Hct   


 


MCV   


 


MCH   


 


MCHC   


 


RDW   


 


Plt Count   


 


MPV   


 


Gran %   


 


Immature Gran % (Auto)   


 


Nucleat RBC Rel Count   


 


Eos # (Auto)   


 


Immature Gran # (Auto)   


 


Absolute Lymphs (auto)   


 


Absolute Monos (auto)   


 


Absolute Nucleated RBC   


 


Lymphocytes %   


 


Monocytes %   


 


Eosinophils %   


 


Basophils %   


 


Absolute Granulocytes   


 


Basophils #   


 


Sodium    140


 


Potassium    4.2


 


Chloride    103


 


Carbon Dioxide    25


 


Anion Gap    15.9 H


 


BUN    21 H


 


Creatinine    1.30 H


 


Estimated GFR    57.0


 


Glucose    235 H


 


Lactic Acid   3.1 H 


 


Calcium    9.2


 


Total Bilirubin    1.20


 


AST    37


 


ALT    30


 


Alkaline Phosphatase    96


 


Serum Total Protein    7.7


 


Albumin    4.6


 


Amylase    122 H


 


Lipase    549 H


 


Urinalys Dipstick Clnc  MAIN LAB  


 


Urine Color  YELLOW  


 


Urine Appearance  CLEAR  


 


Urine pH  5.5  


 


Ur Specific Gravity  1.025  


 


POC Urine Protein Conf  NEGATIVE  


 


Urine Ketones  SMALL-15  


 


Urine Nitrite  NEGATIVE  


 


Urine Bilirubin  NEGATIVE  


 


Urine Urobilinogen  0.2  


 


Urine Leukocytes  NEGATIVE  


 


Urine WBC (Auto)  0-2  


 


Urine RBC (Auto)  0-2  


 


U Epithel Cells (Auto)  RARE  


 


Urine Bacteria (Auto)  Not Reportable  


 


Urine RBC  NEGATIVE  


 


Urine Mucus (Auto)  SLIGHT  


 


Ur Culture Indicated?  NO  


 


Urine Glucose  >=1000  


 


Influenza Type A Ag   


 


Influenza Type B Ag   


 


RSV (PCR)   


 


SARS-CoV-2 (PCR)   


 


Slides for Path Review   














  09/19/22





  20:36


 


WBC  14.0 H


 


RBC  4.76


 


Hgb  13.0


 


Hct  42.6


 


MCV  89.5


 


MCH  27.3


 


MCHC  30.5 L


 


RDW  14.0


 


Plt Count  303


 


MPV  11.9 H


 


Gran %  84.0 H


 


Immature Gran % (Auto)  0.5 H


 


Nucleat RBC Rel Count  0.0


 


Eos # (Auto)  0.28


 


Immature Gran # (Auto)  0.07 H


 


Absolute Lymphs (auto)  1.05


 


Absolute Monos (auto)  0.74


 


Absolute Nucleated RBC  0.00


 


Lymphocytes %  7.5 L


 


Monocytes %  5.3


 


Eosinophils %  2.0


 


Basophils %  0.7


 


Absolute Granulocytes  11.72 H


 


Basophils #  0.10


 


Sodium 


 


Potassium 


 


Chloride 


 


Carbon Dioxide 


 


Anion Gap 


 


BUN 


 


Creatinine 


 


Estimated GFR 


 


Glucose 


 


Lactic Acid 


 


Calcium 


 


Total Bilirubin 


 


AST 


 


ALT 


 


Alkaline Phosphatase 


 


Serum Total Protein 


 


Albumin 


 


Amylase 


 


Lipase 


 


Urinalys Dipstick Clnc 


 


Urine Color 


 


Urine Appearance 


 


Urine pH 


 


Ur Specific Gravity 


 


POC Urine Protein Conf 


 


Urine Ketones 


 


Urine Nitrite 


 


Urine Bilirubin 


 


Urine Urobilinogen 


 


Urine Leukocytes 


 


Urine WBC (Auto) 


 


Urine RBC (Auto) 


 


U Epithel Cells (Auto) 


 


Urine Bacteria (Auto) 


 


Urine RBC 


 


Urine Mucus (Auto) 


 


Ur Culture Indicated? 


 


Urine Glucose 


 


Influenza Type A Ag 


 


Influenza Type B Ag 


 


RSV (PCR) 


 


SARS-CoV-2 (PCR) 


 


Slides for Path Review 








                             Orders (Last 24 hours)











 Category Date Time Status


 


 Bedrest AS TOLERATED Activity  09/19/22 23:31 Active


 


 Change admission status [Change to Full Admit] ROUTINE Care  09/20/22 04:30 

Active


 


 IV Insertion STAT Care  09/19/22 20:32 Completed


 


 NGT [Gastric Tube Insertion] ROUTINE Care  09/20/22 14:27 Active


 


 Place in Observation ROUTINE Care  09/19/22 23:31 Completed


 


 Weight,Daily 0600 Care  09/19/22 23:31 Active


 


 Consult Surgery ROUTINE Cons  09/20/22 14:26 Active


 


 Clear Liquid Diet  09/20/22 Breakfast Completed


 


 NPO Diet  09/20/22 09:05 Active


 


 ABDOMEN AND PELVIS W/0 CONTRAS [CT] Stat Exams  09/19/22 20:30 Completed


 


 CHEST 1 VIEW (PORTABLE) Urgent Exams  09/20/22 00:00 Completed


 


 AMYLASE Stat Lab  09/19/22 20:36 Completed


 


 AMYLASE Stat Lab  09/20/22 04:30 Completed


 


 BMP AM.LAB Lab  09/21/22 04:00 Ordered


 


 CBC AM.LAB Lab  09/21/22 04:00 Ordered


 


 CBC W DIFF AM.LAB Lab  09/20/22 04:30 Completed


 


 CBC W DIFF Stat Lab  09/19/22 20:36 Completed


 


 CMP AM.LAB Lab  09/20/22 04:30 Completed


 


 CMP Stat Lab  09/19/22 20:36 Completed


 


 LIPASE Stat Lab  09/19/22 20:36 Completed


 


 LIPASE Stat Lab  09/20/22 04:30 Completed


 


 Lactic Acid AM.LAB Lab  09/21/22 04:00 Ordered


 


 Lactic Acid Routine Lab  09/20/22 04:30 Completed


 


 Lactic Acid Stat Lab  09/19/22 20:38 Completed


 


 Lactic Acid Stat Lab  09/19/22 22:44 Completed


 


 UA W/RFX CULTURE Stat Lab  09/19/22 22:31 Completed


 


 Acetaminophen 325 mg*** [Tylenol 325 mg***] Med  09/19/22 23:31 Active





 650 mg PO Q4H PRN PRN   


 


 Albuterol Common Canister*** [Ventolin Common Canister* Med  09/20/22 11:36 

Active





 **]   





 2 puff IH QIDRT   


 


 Albuterol Common Canister*** [Ventolin Common Canister* Med  09/20/22 07:00 

Discontinued





 **]   





 4 puff IH QIDRT   


 


 Amlodipine Besylate 5 mg*** [Norvasc 5 mg***] Med  09/20/22 13:00 Active





 2.5 mg PO DAILY   


 


 Apixaban [Eliquis 2.5 mg Tablet***] Med  09/20/22 13:00 Active





 5 mg PO BID   


 


 Cyanocobalamin 500 Mcg*** [Vitamin B-12 500 MCG***] Med  09/20/22 13:00 Active





 1,000 mcg PO DAILY   


 


 Famotidine 20 mg*** [Pepcid 20 MG***] Med  09/20/22 13:00 Active





 20 mg PO DAILY   


 


 Levofloxacin [Levofloxacin 500MG/100ML D5W] Med  09/20/22 14:00 Active





 500 mg in 100 ml IV DAILY   


 


 Losartan Potassium 50 mg*** [Cozaar 50 MG***] Med  09/20/22 13:00 Active





 50 mg PO DAILY   


 


 Medication Intervention Med  09/20/22 13:00 Active





 1 each MC .RN TO CHECK   


 


 Medication Intervention Med  09/20/22 13:00 Active





 1 each MC .RN TO CHECK   


 


 Metformin HCl 500 mg*** [Glucophage 500 MG***] Med  09/20/22 22:00 Active





 500 mg PO HS   


 


 Metoprolol Succinate 25 mg Xl* [Toprol-Xl 25MG Tablets* Med  09/20/22 13:00 

Active





 **]   





 25 mg PO BID   


 


 Metoprolol Succinate 50 mg*** [Toprol Xl 50 MG***] Med  09/20/22 13:00 Active





 50 mg PO BID   


 


 Metronidazole 500 mg Premix [Flagyl 500 mg Ivpb] Med  09/20/22 00:00 Active





 500 mg in 100 ml IV Q6HT   


 


 Metronidazole 500 mg Premix [Flagyl 500 mg Ivpb] Med  09/19/22 22:16 

Discontinued





 500 mg in 100 ml IV STAT   


 


 Metronidazole 500 mg Premix [Flagyl 500 mg Ivpb] Med  09/19/22 22:25 

Discontinued





 500 mg in 100 ml IV UD   


 


 Morphine Sulfate 4 mg Inj*** Med  09/19/22 22:25 Discontinued





 4 mg .ROUTE .STK-MED ONE   


 


 Morphine Sulfate 4 mg Inj*** Med  09/19/22 23:31 Hold





 4 mg IV Q6H PRN PRN   


 


 Morphine Sulfate 4 mg Inj*** Med  09/19/22 22:12 Discontinued





 4 mg IV STAT ONE   


 


 Multivitamins,Therapeutic Tab* [Theragran Multivitamin* Med  09/20/22 13:00 

Active





 **]   





 1 tab PO DAILY   


 


 NaCl 0.45% 1000 ml [Sodium Chloride 0.45% 1000 ML] 1, Med  09/19/22 23:31 

Discontinued





 000 ml   





  mls/hr   


 


 NaCl 0.45% 1000 ml [Sodium Chloride 0.45% 1000 ML] 1, Med  09/20/22 00:15 

Discontinued





 000 ml   





 IV UD   


 


 NaCl 0.9% 1000 ml [Sodium Chloride 0.9% 1000 ML] 1,000 Med  09/19/22 21:01 

Discontinued





 ml   





 .ROUTE UD   


 


 NaCl 0.9% 1000 ml [Sodium Chloride 0.9% 1000 ML] 1,000 Med  09/19/22 23:03 

Discontinued





 ml   





 .ROUTE UD   


 


 NaCl 0.9% 1000 ml [Sodium Chloride 0.9% 1000 ML] 1,000 Med  09/19/22 20:59 

Discontinued





 ml   





  mls/hr   


 


 NaCl 0.9% 1000 ml [Sodium Chloride 0.9% 1000 ML] 1,000 Med  09/19/22 23:02 

Discontinued





 ml   





  mls/hr   


 


 Omega-3 Fatty Acids/Fish Oil** [Fish Oil 1,000 mg Med  09/20/22 13:00 Active





 Capsule***]   





 4,000 mg PO BID   


 


 Ondansetron HCl 4 mg/2 ml** [Zofran 4 MG/2 ML VIAL**] Med  09/19/22 22:24 

Discontinued





 4 mg .ROUTE .STK-MED ONE   


 


 Ondansetron HCl 4 mg/2 ml** [Zofran 4 MG/2 ML VIAL**] Med  09/20/22 01:51 

Discontinued





 4 mg .ROUTE .STK-MED ONE   


 


 Ondansetron HCl 4 mg/2 ml** [Zofran 4 MG/2 ML VIAL**] Med  09/20/22 05:25 

Active





 4 mg IV Q4H PRN PRN   


 


 Ondansetron HCl 4 mg/2 ml** [Zofran 4 MG/2 ML VIAL**] Med  09/19/22 23:31 

Discontinued





 4 mg IV Q6H PRN PRN   


 


 Ondansetron HCl 4 mg/2 ml** [Zofran 4 MG/2 ML VIAL**] Med  09/19/22 22:12 

Discontinued





 4 mg IV STAT ONE   


 


 PANTOPRAZOLE 40 mg Tablet*** [Protonix 40MG Tablet***] Med  09/20/22 13:00 

Active





 40 mg PO DAILY   


 


 Pantoprazole 40 mg*** [Protonix 40 mg IV***] Med  09/19/22 23:31 Discontinued





 40 mg IV Q24H   


 


 Pantoprazole 40 mg*** [Protonix 40 mg IV***] Med  09/20/22 22:00 Discontinued





 40 mg IV QPM   


 


 Paroxetine HCl 20 mg*** [Paxil 20 MG***] Med  09/20/22 13:00 Active





 20 mg PO DAILY   


 


 Promethazine HCl 25 mg Amp*** [Phenergan 25 MG INJ***] Med  09/20/22 12:03 

Active





 25 mg   





 NaCl 0.9% [Sodium Chloride 0.9%] 100 ml   





 IV Q4H PRN   


 


 Pyridoxine HCl 100 mg*** [Vitamin B-6 (Pyridoxine) 100 Med  09/20/22 13:00 

Active





 MG***]   





 100 mg PO DAILY   


 


 Ringers Solution,Lactated [Lactated Ringers] 1,000 ml Med  09/20/22 05:30 

Active





  mls/hr   


 


 Senna 8.6 mg*** [Senokot 8.6 mg***] Med  09/20/22 12:29 Active





 8.6 mg PO DAILY PRN PRN   


 


 Simvastatin 20Mg** [Zocor 20Mg**] Med  09/20/22 22:00 Active





 20 mg PO HS   


 


 Tamsulosin HCl 0.4 mg*** [Flomax 0.4 MG***] Med  09/20/22 22:00 Active





 0.8 mg PO HS   


 


 Vitamin E 400 Units*** [Vitamin E 400 UNIT SOFTGEL***] Med  09/20/22 13:00 

Active





 400 u PO DAILY   


 


 BiPap/CPAP ROUTINE RT  09/19/22 23:56 Completed


 


 BiPap/CPAP ROUTINE RT  09/20/22 00:29 Active


 


 Oxygen Nasal Cannula 2 lpm RT  09/20/22 11:24 Active


 


 Pulse Oximetry .spot check RT  09/19/22 23:52 Active


 


 Respiratory Therapy Assessment DAILY RT  09/19/22 23:52 Active








                       Patient Care Notes (Last 24 hours)





09/20/22 16:15 Nursing Note by Quinn,Valerie


REC. ORDER TO D/C CLEAR LIQUIDS STARTING TOMORROW AM AND NEW ORDER TO RECHECK 

LACTIC ACID WITH AM.





Initialized on 09/20/22 16:15 - END OF NOTE








09/20/22 15:38 Nursing Note by Valerie Ball


NG TUBE PLACED IN RIGHT NARE. ATTEMPTED EACH NARE WITH 16F WITHOUT SUCCESS, 2ND 

ATTEMPT TO RIGHT NARE WITH 14F SUCCESSFUL. SET TO LOW INTERMITTENT SUCTION - 

1950 CC OF DARK BROWN LIQUID IN CANISTER X 2. THIRD CANISTER IN PLACE AT THIS 

TIME. PT REPORTS THAT HE IS ALREADY FEELING SOME RELIEF. 





Initialized on 09/20/22 15:38 - END OF NOTE








09/20/22 14:25 Nursing Note by Valerie Ball


CALLED AND UPDATED DR. CHAPMAN THAT PT VOMITTED 450CC DARK BROWN THICK EMESIS 

AND THAT PT REPORTS IT TASTE LIKE BOWEL. REC. THE FOLLOWING ORDERS: NG TUBE AND 

SX CONSULT. 








Initialized on 09/20/22 14:25 - END OF NOTE








09/20/22 12:46 Nursing Note by Valerie Ball


PT TOOK 1 SIP OF TAYLOR MIST AND THEN HAD 100CC DARK BROWN EMESIS MINUTES LATER.

WILL CONTINUE NPO AS ORDERED. 





Initialized on 09/20/22 12:46 - END OF NOTE








09/20/22 12:02 Nursing Note by Valerie Ball


ROUNDED ON PT WITH DR. CHAPMAN: RECEIVED THE FOLLOWING ORDERS: ROCEPHIN 1 GM IV 

Q D, PHENERGAN IVPB IV Q6H PRN, CLEAR LIQUIDS STARTING TOMORROW AM, CBC AND BMP 

TOMORROW AM. 


Addendum entered by Valerie Ball RN  09/20/22 12:07: 





CALLED DR. CHAPMAN TO NOTIFY OF ALLERGYS. RECEIVED ORDER FOR LEVAQUIN 500MG IV Q

D RATHER THAN ROCEPHIN. AND CLARIFIED THAT PHENERGAN IVPB Q4H PRN. 








Initialized on 09/20/22 12:02 - END OF NOTE








09/20/22 10:50 (created 09/20/22 11:21) Nursing Note by Valerie Ball


350CC OF DARK BROWN EMESIS AT THIS TIME 





Initialized on 09/20/22 11:21 - END OF NOTE








09/20/22 05:25 Nursing Note by Guero Springer notified of Lactic Acid 3.8 along with the bmp and cbc results Pt 

reports that Morphine might be making him sick. Hold on Morphin increase 

frequency of Zofran to every 4 hrs prn and change IV flds to LR at 150 ml/hr 





Initialized on 09/20/22 05:25 - END OF NOTE














Code(s): K56.0 - PARALYTIC ILEUS   





(2) Abdominal pain


Current Visit: Yes   Status: Acute   


Qualifiers: 


   Abdominal location: generalized   Qualified Code(s): R10.84 - Generalized 

abdominal pain   


Code(s): R10.9 - UNSPECIFIED ABDOMINAL PAIN   





(3) Gastroenteritis


Current Visit: Yes   Status: Acute   Code(s): K52.9 - NONINFECTIVE 

GASTROENTERITIS AND COLITIS, UNSPECIFIED

## 2022-09-20 NOTE — XRAY
Indication: Abdomen/flank pain.  Nausea and constipation.



Multiple contiguous axial images obtained through the abdomen and pelvis

without contrast.



Comparison: None



Lung bases hyperinflated with minimal dependent atelectasis.  No infiltrate or

effusion.  Heart not enlarged.



Stomach is distended with food/fluid.  Noncontrasted stomach and bowel loops

appear nonobstructed.  Small bowel loops are mildly uniformly fluid distended

with fluid leveling, ileus versus gastroenteritis.  Normal air-filled

appendix.  Colon demonstrates normal bowel gas with little scattered colonic

fecal debris.  No free fluid/air.  Incidental 3.6 cm left lower renal

exophytic cyst, tiny splenic calcified granulomas, and cholecystectomy clips.



Remaining liver, pancreas, spleen, adrenal glands, kidneys, ureters, and

bladder are unremarkable for noncontrast exam.  Mild scattered aortoiliac

calcifications without AAA.



Osseous structures intact with mild osteopenia, mild/moderate degenerative

changes throughout the thoracolumbar spine, and mild dextroscoliosis centered

at L3.  Small fatty supraumbilical ventral hernia.



Impression:

1.  Fluid distended stomach and small bowel loops with fluid leveling, ileus

versus gastroenteritis.

2.  Incidental left renal cyst, arteriosclerotic disease, chronic bony

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

## 2022-09-21 LAB
ANION GAP SERPL CALC-SCNC: 15.2 MEQ/L (ref 5–15)
BUN SERPL-MCNC: 35 MG/DL (ref 9–20)
CALCIUM SPEC-MCNC: 8.8 MG/DL (ref 8.4–10.2)
CHLORIDE SERPL-SCNC: 103 MMOL/L (ref 98–107)
CO2 SERPL-SCNC: 25 MMOL/L (ref 22–30)
CREAT SERPL-MCNC: 1.53 MG/DL (ref 0.66–1.25)
GFR SERPLBLD BASED ON 1.73 SQ M-ARVRAT: 47.1 ML/MIN
GLUCOSE SERPL-MCNC: 266 MG/DL (ref 74–106)
HCT VFR BLD AUTO: 39.5 % (ref 42–50)
HGB BLD-MCNC: 11.8 G/DL (ref 12.5–18)
MCH RBC QN AUTO: 27 PG (ref 26–32)
MCHC RBC AUTO-ENTMCNC: 29.9 G/DL (ref 32–36)
PLATELET # BLD AUTO: 295 X10^3/UL (ref 150–450)
POTASSIUM SERPLBLD-SCNC: 4.2 MMOL/L (ref 3.5–5.1)
RBC # BLD AUTO: 4.37 X10^6/UL (ref 4.1–5.6)
SODIUM SERPL-SCNC: 139 MMOL/L (ref 137–145)
WBC # BLD AUTO: 9.1 X10^3/UL (ref 4–10.5)

## 2022-09-21 RX ADMIN — IPRATROPIUM BROMIDE AND ALBUTEROL SULFATE SCH ML: .5; 3 SOLUTION RESPIRATORY (INHALATION) at 08:17

## 2022-09-21 RX ADMIN — PAROXETINE HYDROCHLORIDE SCH MG: 20 TABLET, FILM COATED ORAL at 11:00

## 2022-09-21 RX ADMIN — THERA TABS SCH TAB: TAB at 11:00

## 2022-09-21 RX ADMIN — METOPROLOL SUCCINATE SCH MG: 25 TABLET, EXTENDED RELEASE ORAL at 10:59

## 2022-09-21 RX ADMIN — Medication SCH MG: at 21:37

## 2022-09-21 RX ADMIN — METOPROLOL SUCCINATE SCH MG: 50 TABLET, EXTENDED RELEASE ORAL at 10:59

## 2022-09-21 RX ADMIN — IPRATROPIUM BROMIDE AND ALBUTEROL SULFATE SCH ML: .5; 3 SOLUTION RESPIRATORY (INHALATION) at 14:46

## 2022-09-21 RX ADMIN — METRONIDAZOLE SCH MLS/HR: 500 INJECTION, SOLUTION INTRAVENOUS at 00:14

## 2022-09-21 RX ADMIN — WATER SCH MG: 1 INJECTION INTRAMUSCULAR; INTRAVENOUS; SUBCUTANEOUS at 15:02

## 2022-09-21 RX ADMIN — WATER SCH MG: 1 INJECTION INTRAMUSCULAR; INTRAVENOUS; SUBCUTANEOUS at 21:38

## 2022-09-21 RX ADMIN — IPRATROPIUM BROMIDE AND ALBUTEROL SULFATE SCH ML: .5; 3 SOLUTION RESPIRATORY (INHALATION) at 11:14

## 2022-09-21 RX ADMIN — METRONIDAZOLE SCH MLS/HR: 500 INJECTION, SOLUTION INTRAVENOUS at 23:34

## 2022-09-21 RX ADMIN — INSULIN LISPRO PRN UNIT: 100 INJECTION, SOLUTION INTRAVENOUS; SUBCUTANEOUS at 16:33

## 2022-09-21 RX ADMIN — METOPROLOL SUCCINATE SCH MG: 50 TABLET, EXTENDED RELEASE ORAL at 21:38

## 2022-09-21 RX ADMIN — ENOXAPARIN SODIUM SCH MG: 100 INJECTION SUBCUTANEOUS at 21:37

## 2022-09-21 RX ADMIN — ALBUTEROL SULFATE SCH PUFF: 90 AEROSOL, METERED RESPIRATORY (INHALATION) at 07:10

## 2022-09-21 RX ADMIN — TAMSULOSIN HYDROCHLORIDE SCH MG: 0.4 CAPSULE ORAL at 21:37

## 2022-09-21 RX ADMIN — SIMVASTATIN SCH MG: 20 TABLET, FILM COATED ORAL at 21:38

## 2022-09-21 RX ADMIN — METRONIDAZOLE SCH MLS/HR: 500 INJECTION, SOLUTION INTRAVENOUS at 18:43

## 2022-09-21 RX ADMIN — AMLODIPINE BESYLATE SCH MG: 5 TABLET ORAL at 11:00

## 2022-09-21 RX ADMIN — Medication SCH U: at 11:01

## 2022-09-21 RX ADMIN — METRONIDAZOLE SCH MLS/HR: 500 INJECTION, SOLUTION INTRAVENOUS at 11:45

## 2022-09-21 RX ADMIN — IPRATROPIUM BROMIDE AND ALBUTEROL SULFATE SCH ML: .5; 3 SOLUTION RESPIRATORY (INHALATION) at 19:28

## 2022-09-21 RX ADMIN — LOSARTAN POTASSIUM SCH MG: 50 TABLET, FILM COATED ORAL at 11:00

## 2022-09-21 RX ADMIN — METRONIDAZOLE SCH MLS/HR: 500 INJECTION, SOLUTION INTRAVENOUS at 05:46

## 2022-09-21 RX ADMIN — Medication SCH MG: at 11:01

## 2022-09-21 RX ADMIN — IPRATROPIUM BROMIDE AND ALBUTEROL SULFATE SCH ML: .5; 3 SOLUTION RESPIRATORY (INHALATION) at 22:29

## 2022-09-21 RX ADMIN — PANTOPRAZOLE SODIUM SCH MG: 40 TABLET, DELAYED RELEASE ORAL at 10:59

## 2022-09-21 RX ADMIN — FAMOTIDINE SCH MG: 20 TABLET, FILM COATED ORAL at 11:00

## 2022-09-21 RX ADMIN — Medication SCH MG: at 11:00

## 2022-09-21 RX ADMIN — CYANOCOBALAMIN TAB 500 MCG SCH MCG: 500 TAB at 11:00

## 2022-09-21 RX ADMIN — WATER SCH MG: 1 INJECTION INTRAMUSCULAR; INTRAVENOUS; SUBCUTANEOUS at 08:57

## 2022-09-21 RX ADMIN — INSULIN LISPRO PRN UNIT: 100 INJECTION, SOLUTION INTRAVENOUS; SUBCUTANEOUS at 11:43

## 2022-09-21 RX ADMIN — METOPROLOL SUCCINATE SCH MG: 25 TABLET, EXTENDED RELEASE ORAL at 21:37

## 2022-09-21 RX ADMIN — LEVOFLOXACIN SCH MLS/HR: 5 INJECTION, SOLUTION INTRAVENOUS at 10:08

## 2022-09-21 NOTE — PCM.NOTE
Date and Time: 09/21/22 1949





Subjective Assessment: 





c/o abdominal distension and shortness of breath





- Review of Systems


Constitutional: No Fever, No Chills


Eyes: No Symptoms


Ears, Nose, & Throat: No Symptoms


Respiratory: No Cough, No Short Of Breath


Cardiac: No Chest Pain, No Edema, No Syncope


Abdominal/Gastrointestinal: Abdominal Pain, Nausea, Vomiting, Other (abdominal 

distension), No Diarrhea


Genitourinary Symptoms: No Dysuria


Musculoskeletal: No Back Pain, No Neck Pain


Skin: No Rash


Neurological: No Dizziness, No Focal Weakness, No Sensory Changes


Psychological: No Symptoms


Endocrine: No Symptoms


Hematologic/Lymphatic: No Symptoms


Immunological/Allergic: No Symptoms





Objective Exam


General Appearance: no apparent distress, alert


Neurologic Exam: alert, oriented x 3, cooperative, normal mood/affect, nml 

cerebellar function, sensation nml, No motor deficits


Skin Exam: normal color, warm, dry


Eye Exam: PERRL, EOMI, eyes nml inspection


Ears, Nose, Throat Exam: normal ENT inspection, pharynx normal, moist mucous 

membranes


Neck Exam: normal inspection, non-tender, supple, full range of motion


Respiratory Exam: normal breath sounds, lungs clear, No respiratory distress


Cardiovascular Exam: regular rate/rhythm, normal heart sounds


Gastrointestinal/Abdomen Exam: tenderness, distention, No normal bowel sounds, 

No mass


Extremity Exam: normal inspection, normal range of motion


Back Exam: normal inspection, normal range of motion, No CVA tenderness, No 

vertebral tenderness


Male Genitalia Exam: deferred


Rectal Exam: deferred





OBJECTIVE DATA


Vital Signs: 


                               Vital Signs - 24 hr











  Temp Pulse Resp BP Pulse Ox


 


 09/21/22 19:29   85  16   92 L


 


 09/21/22 15:36  96.9 F  88  14  152/68  95


 


 09/21/22 14:49   86  22   94 L


 


 09/21/22 12:00  96.9 F  86  16  171/82  94 L


 


 09/21/22 11:19   86  24   94 L


 


 09/21/22 08:18   88  22   91 L


 


 09/21/22 07:52  96.9 F  96 H  18  183/88  93 L


 


 09/21/22 07:15   86  22   90 L


 


 09/21/22 04:24     164/78 


 


 09/21/22 04:00  97.5 F  85  20  187/81  94 L


 


 09/21/22 00:00  96.4 F  93 H  21  142/80  92 L


 


 09/20/22 19:59  96.6 F  95 H  18  164/77  96








                        Pain Assessment - Last Documented











Pain Intensity                 0


 


Pain Scale Used                0-10 Pain Scale











Intake and Output: 


                                 Intake & Output











 09/19/22 09/20/22 09/21/22 09/22/22





 11:59 11:59 11:59 11:59


 


Intake Total  540 3084 


 


Output Total  5437 1184 3667


 


Balance  -174 -3351 -8651


 


Weight  146.1 kg 141.9 kg 











Lab Results: 


                            Lab Results-Last 24 Hours











  09/21/22 09/21/22 09/21/22 Range/Units





  05:14 05:14 05:14 


 


WBC  9.1    (4.0-10.5)  x10^3/uL


 


RBC  4.37    (4.1-5.6)  x10^6/uL


 


Hgb  11.8 L    (12.5-18.0)  g/dL


 


Hct  39.5 L    (42-50)  %


 


MCV  90.4    ()  fL


 


MCH  27.0    (26-32)  pg


 


MCHC  29.9 L    (32-36)  g/dL


 


RDW  14.3 H    (11.5-14.0)  %


 


Plt Count  295    (150-450)  x10^3/uL


 


MPV  11.9 H    (7.5-11.0)  fL


 


Sodium   139   (137-145)  mmol/L


 


Potassium   4.2   (3.5-5.1)  mmol/L


 


Chloride   103   ()  mmol/L


 


Carbon Dioxide   25   (22-30)  mmol/L


 


Anion Gap   15.2 H   (5-15)  MEQ/L


 


BUN   35 H   (9-20)  mg/dL


 


Creatinine   1.53 H   (0.66-1.25)  mg/dL


 


Estimated GFR   47.1   ML/MIN


 


Glucose   266 H   ()  mg/dL


 


POC Glucometer     (74 to 106)  mg/dL


 


Hemoglobin A1c     (4.5-6.0)  %


 


Lactic Acid    2.3 H  (0.4-2.0)  


 


Calcium   8.8   (8.4-10.2)  mg/dL














  09/21/22 09/21/22 09/21/22 Range/Units





  07:37 07:53 11:22 


 


WBC     (4.0-10.5)  x10^3/uL


 


RBC     (4.1-5.6)  x10^6/uL


 


Hgb     (12.5-18.0)  g/dL


 


Hct     (42-50)  %


 


MCV     ()  fL


 


MCH     (26-32)  pg


 


MCHC     (32-36)  g/dL


 


RDW     (11.5-14.0)  %


 


Plt Count     (150-450)  x10^3/uL


 


MPV     (7.5-11.0)  fL


 


Sodium     (137-145)  mmol/L


 


Potassium     (3.5-5.1)  mmol/L


 


Chloride     ()  mmol/L


 


Carbon Dioxide     (22-30)  mmol/L


 


Anion Gap     (5-15)  MEQ/L


 


BUN     (9-20)  mg/dL


 


Creatinine     (0.66-1.25)  mg/dL


 


Estimated GFR     ML/MIN


 


Glucose     ()  mg/dL


 


POC Glucometer  248 H   266 H  (74 to 106)  mg/dL


 


Hemoglobin A1c   8.16 H   (4.5-6.0)  %


 


Lactic Acid     (0.4-2.0)  


 


Calcium     (8.4-10.2)  mg/dL














  09/21/22 Range/Units





  16:30 


 


WBC   (4.0-10.5)  x10^3/uL


 


RBC   (4.1-5.6)  x10^6/uL


 


Hgb   (12.5-18.0)  g/dL


 


Hct   (42-50)  %


 


MCV   ()  fL


 


MCH   (26-32)  pg


 


MCHC   (32-36)  g/dL


 


RDW   (11.5-14.0)  %


 


Plt Count   (150-450)  x10^3/uL


 


MPV   (7.5-11.0)  fL


 


Sodium   (137-145)  mmol/L


 


Potassium   (3.5-5.1)  mmol/L


 


Chloride   ()  mmol/L


 


Carbon Dioxide   (22-30)  mmol/L


 


Anion Gap   (5-15)  MEQ/L


 


BUN   (9-20)  mg/dL


 


Creatinine   (0.66-1.25)  mg/dL


 


Estimated GFR   ML/MIN


 


Glucose   ()  mg/dL


 


POC Glucometer  249 H  (74 to 106)  mg/dL


 


Hemoglobin A1c   (4.5-6.0)  %


 


Lactic Acid   (0.4-2.0)  


 


Calcium   (8.4-10.2)  mg/dL











Radiology Exams: 


                              Radiology Procedures











 Category Date Time Status


 


 ABDOMEN AND PELVIS W/0 CONTRAS [CT] Stat Exams  09/19/22 20:30 Completed


 


 CHEST 1 VIEW (PORTABLE) Stat Exams  09/21/22 00:04 Completed


 


 CHEST 1 VIEW (PORTABLE) Stat Exams  09/21/22 09:06 Completed


 


 CHEST 1 VIEW (PORTABLE) Urgent Exams  09/20/22 00:00 Completed


 


 CHEST 1 VIEW (PORTABLE) Urgent Exams  09/20/22 17:52 Completed











Multi-Disciplinary Progress Notes: 


                        Multi-Disciplinary Progress Notes





09/21/22 11:18 Case Management Note by Haylie Graves


REVIEWED CHART- PATIENT STILL ACUTELY ILL. DO NOT ANTICIPATE ANY CHANGES IN DC 

PLAN AT THIS TIME





Initialized on 09/21/22 11:18 - END OF NOTE








09/21/22 08:23 Respiratory Note by Heena Langley


Patient still complaining of shortness of breath and audible and auscultated I/E

 wheezes.  Will order Duoneb via nebulizer Q4 and cancel Albuterol MDI order.  

Dr. Chapman made aware per KATERIN Newton.





Initialized on 09/21/22 08:23 - END OF NOTE

















Assessment/Plan


(1) Paralytic ileus


Current Visit: Yes   Status: Acute   


Assessment & Plan: 


                                 Chief Complaint





Diagnosis                        abdominal pain, nausea and vomiting for 1 day





                                    Allergies











Allergy/AdvReac Type Severity Reaction Status Date / Time


 


Sulfa (Sulfonamide Allergy Severe  Verified 09/19/22 19:49





Antibiotics)     


 


codeine Allergy Intermediate Nausea and Verified 09/19/22 19:49





   Vomiting  


 


cephalexin [From Keflex] Allergy Unknown  Verified 09/19/22 19:49








                           Vital Signs (Last 24 hours)











  Temp Pulse Resp BP Pulse Ox


 


 09/21/22 19:29   85  16   92 L


 


 09/21/22 15:36  96.9 F  88  14  152/68  95


 


 09/21/22 14:49   86  22   94 L


 


 09/21/22 12:00  96.9 F  86  16  171/82  94 L


 


 09/21/22 11:19   86  24   94 L


 


 09/21/22 08:18   88  22   91 L


 


 09/21/22 07:52  96.9 F  96 H  18  183/88  93 L


 


 09/21/22 07:15   86  22   90 L


 


 09/21/22 04:24     164/78 


 


 09/21/22 04:00  97.5 F  85  20  187/81  94 L


 


 09/21/22 00:00  96.4 F  93 H  21  142/80  92 L


 


 09/20/22 19:59  96.6 F  95 H  18  164/77  96








                                Home Medications











 Medication  Instructions  Recorded  Confirmed  Last Taken  Type


 


Albuterol Sulfate [Proair Hfa] 8.5 gm IH Q4-6HPRN PRN 09/19/22 09/19/22 Unknown 

History


 


Amlodipine Besylate [Norvasc] 2.5 mg PO DAILY 09/19/22 09/19/22 09/19/22 09:00 

History


 


Omega-3/Dha/Epa/Fish Oil [Fish Oil 4,000 mg PO BID 09/19/22 09/19/22 09/19/22 

18:00 History





1,000 mg Softgel]     








                               Current Medications











Generic Name Dose Route Start Last Admin





  Trade Name Freq  PRN Reason Stop Dose Admin


 


Acetaminophen  650 mg  09/19/22 23:31  09/20/22 21:28





  Acetaminophen 325 Mg Tablet  PO  10/19/22 23:30  650 mg





  Q4H PRN PRN   Administration





  PAIN, FEVER, HEADACHE  


 


Albuterol/Ipratropium  3 ml  09/21/22 07:00  09/21/22 19:28





  Ipratropium/Albuterol Sulfate 3 Ml Ampul.Neb  IH  10/21/22 06:59  3 ml





  Q4HRT JOYCELYN   Administration


 


Amlodipine Besylate  2.5 mg  09/20/22 13:00  09/21/22 11:00





  Amlodipine Besylate 5 Mg Tablet  PO  10/20/22 12:59  2.5 mg





  DAILY JOYCELYN   Administration


 


Methylprednisolone Sodium  0 mg  09/21/22 08:30  09/21/22 15:02





Succinate 40 mg/ Sterile Water  IV  10/21/22 08:29  40 mg





1 ml  Q8HT JOYCELYN   Administration


 


Cyanocobalamin  1,000 mcg  09/20/22 13:00  09/21/22 11:00





  Cyanocobalamin 500 Mcg Tablet  PO  10/20/22 12:59  1,000 mcg





  DAILY JOYCELYN   Administration


 


Enoxaparin Sodium  40 mg  09/20/22 22:00  09/20/22 21:40





  Enoxaparin Sodium*** 40 Mg/0.4 Ml Syringe  SQ  10/20/22 21:59  40 mg





  HS JOYCELYN   Administration


 


Famotidine  20 mg  09/20/22 13:00  09/21/22 11:00





  Famotidine 20 Mg Tablet  PO  10/20/22 12:59  20 mg





  DAILY JOYCELYN   Administration


 


Fish Oil  4,000 mg  09/20/22 13:00  09/21/22 11:00





  Omega-3 Fatty Acids/Fish Oil 1000 Mg Capsule  PO  10/20/22 12:59  4,000 mg





  BID JOYCELYN   Administration


 


Metronidazole  500 mg in 100 mls @ 200 mls/hr  09/20/22 00:00  09/21/22 18:43





  Flagyl 500 Mg Ivpb  IV  10/20/22 00:00  200 mls/hr





  Q6HT JOYCELYN   Administration


 


Promethazine HCl 25 mg/ Sodium  101 mls @ 200 mls/hr  09/20/22 12:03  09/20/22 

12:41





  Chloride  IV  10/20/22 12:02  200 mls/hr





  Q4H PRN PRN   Administration





  UNCONTROLLED NAUSEA  


 


Levofloxacin/Dextrose  500 mg in 100 mls @ 100 mls/hr  09/20/22 14:00  09/21/22 

10:08





  Levofloxacin 500mg/100ml D5w  IV  10/20/22 13:59  100 mls/hr





  DAILY JOYCELYN   Administration


 


Sodium Chloride  1,000 mls @ 150 mls/hr  09/21/22 08:15  09/21/22 16:38





  Sodium Chloride 0.9% 1000 Ml  IV  10/21/22 08:14  150 mls/hr





  .Q6H40M JOYCELYN   Administration


 


Insulin Human Lispro  0 unit  09/21/22 07:00  09/21/22 16:33





  Insulin Lispro 1 Unit  SQ  10/21/22 06:59  2 unit





  UD PRN   Administration





  HYPERGLYCEMIA  


 


Losartan Potassium  50 mg  09/20/22 13:00  09/21/22 11:00





  Losartan Potassium 50 Mg Tablet  PO  10/20/22 12:59  50 mg





  DAILY JOYCELYN   Administration


 


Metoprolol Succinate  50 mg  09/20/22 13:00  09/21/22 10:59





  Metoprolol Succinate 50 Mg Tablet.Sa  PO  10/20/22 12:59  50 mg





  BID JOYCELYN   Administration


 


Metoprolol Succinate  25 mg  09/20/22 13:00  09/21/22 10:59





  Metoprolol Succinate 25 Mg Xl Tab  PO  10/20/22 12:59  25 mg





  BID JOYCELYN   Administration


 


Miscellaneous Information  1 each  09/20/22 13:00 





  Medication Intervention 1 Each Each    10/20/22 12:59 





  .RN TO CHECK JOYCELYN  


 


Miscellaneous Information  1 each  09/20/22 13:00 





  Medication Intervention 1 Each Each    10/20/22 12:59 





  .RN TO CHECK JOYCELYN  


 


Morphine Sulfate  4 mg  09/19/22 23:31  09/20/22 01:55





  Morphine Sulfate 4 Mg/Ml Injection  IV  09/24/22 23:30  4 mg





  Q6H PRN PRN   Administration





  PAIN  


 


Multivitamins Therapeutic  1 tab  09/20/22 13:00  09/21/22 11:00





  Multivitamins,Therapeutic 1 Tab Tab  PO  10/20/22 12:59  1 tab





  DAILY JOYCELYN   Administration


 


Ondansetron HCl  4 mg  09/20/22 05:25  09/20/22 09:30





  Ondansetron Hcl 4 Mg/2 Ml Vial  IV  10/20/22 05:22  4 mg





  Q4H PRN PRN   Administration





  NAUSEA/VOMITING  


 


Pantoprazole Sodium  40 mg  09/20/22 13:00  09/21/22 10:59





  Protonix (Pantoprazole) 40 Mg Tablet  PO  10/20/22 12:59  40 mg





  DAILY JOYCELYN   Administration


 


Paroxetine HCl  20 mg  09/20/22 13:00  09/21/22 11:00





  Paroxetine Hcl 20 Mg Tablet  PO  10/20/22 12:59  20 mg





  DAILY JOYCELYN   Administration


 


Pyridoxine HCl  100 mg  09/20/22 13:00  09/21/22 11:01





  Pyridoxine Hcl 100 Mg Tablet  PO  10/20/22 12:59  100 mg





  DAILY JOYCELYN   Administration


 


Senna  8.6 mg  09/20/22 12:29  09/20/22 21:26





  Senna 8.6 Mg Tablet  PO  10/20/22 12:28  8.6 mg





  DAILY PRN PRN   Administration





  CONSTIPATION  


 


Simvastatin  20 mg  09/20/22 22:00  09/20/22 21:26





  Simvastatin 20 Mg Tablet  PO  10/20/22 21:59  20 mg





  HS JOYCELYN   Administration


 


Tamsulosin HCl  0.8 mg  09/20/22 22:00  09/20/22 21:25





  Tamsulosin Hcl 0.4 Mg Cap  PO  10/20/22 21:59  0.8 mg





  HS JOYCELYN   Administration


 


Vitamin E  400 u  09/20/22 13:00  09/21/22 11:01





  Vitamin E 400 Iu Softgel  PO  10/20/22 12:59  400 u





  DAILY JOYCELYN   Administration


 


Zolpidem Tartrate  5 mg  09/20/22 20:44  09/20/22 21:27





  Zolpidem Tartrate 5 Mg Tab  PO  10/20/22 20:43  5 mg





  HS PRN PRN   Administration





  INSOMNIA  














Discontinued Medications














Generic Name Dose Route Start Last Admin





  Trade Name Freq  PRN Reason Stop Dose Admin


 


Albuterol Sulfate  4 puff  09/20/22 07:00  09/20/22 11:11





  Albuterol Common Canister Inhaler  IH  10/20/22 06:59  4 puff





  QIDRT JOYCELYN   Administration


 


Albuterol Sulfate  2 puff  09/20/22 11:36  09/21/22 07:10





  Albuterol Common Canister Inhaler  IH  10/20/22 06:59  2 puff





  QIDRT JOYCELYN   Administration


 


Albuterol/Ipratropium  Confirm  09/21/22 08:13 





  Ipratropium/Albuterol Sulfate 3 Ml Ampul.Neb  Administered  09/21/22 08:14 





  Dose  





  3 ml  





  IH  





  .STK-MED ONE  


 


Apixaban  5 mg  09/20/22 13:00  09/20/22 13:41





  Apixaban 2.5 Mg Tablet  PO  10/20/22 12:59  Not Given





  BID JOYCELYN  


 


Sodium Chloride  1,000 mls @ 999 mls/hr  09/19/22 20:59  09/19/22 21:02





  Sodium Chloride 0.9% 1000 Ml  IV  09/19/22 21:59  999 mls/hr





  .Q1H1M STA   Administration


 


Sodium Chloride  Confirm  09/19/22 21:01 





  Sodium Chloride 0.9% 1000 Ml  Administered  09/19/22 21:02 





  Dose  





  1,000 mls @ ud  





  .ROUTE  





  .STK-MED ONE  


 


Metronidazole  500 mg in 100 mls @ 200 mls/hr  09/19/22 22:16  09/19/22 22:35





  Flagyl 500 Mg Ivpb  IV  09/19/22 22:45  200 ml/hr





  STAT STA   200 mls/hr





    Administration


 


Metronidazole  Confirm  09/19/22 22:25 





  Flagyl 500 Mg Ivpb  Administered  09/19/22 22:26 





  Dose  





  500 mg in 100 mls @ ud  





  IV  





  .STK-MED ONE  


 


Sodium Chloride  1,000 mls @ 999 mls/hr  09/19/22 23:02  09/19/22 23:04





  Sodium Chloride 0.9% 1000 Ml  IV  09/20/22 00:02  999 mls/hr





  .Q1H1M STA   Administration


 


Sodium Chloride  Confirm  09/19/22 23:03 





  Sodium Chloride 0.9% 1000 Ml  Administered  09/19/22 23:04 





  Dose  





  1,000 mls @ ud  





  .ROUTE  





  .STK-MED ONE  


 


Sodium Chloride  1,000 mls @ 100 mls/hr  09/19/22 23:31  09/20/22 00:16





  Sodium Chloride 0.45% 1000 Ml  IV  10/19/22 23:30  100 mls/hr





  .Q10H JOYCELYN   Administration


 


Lactated Ringer's  1,000 mls @ 150 mls/hr  09/20/22 05:30  09/21/22 06:33





  Lactated Ringers  IV  10/20/22 05:29  100 mls/hr





  .Q6H40M JOYCELYN   Infusion


 


Sodium Chloride  Confirm  09/20/22 00:15 





  Sodium Chloride 0.45% 1000 Ml  Administered  09/20/22 00:16 





  Dose  





  1,000 mls @ ud  





  IV  





  .STK-MED ONE  


 


Lactated Ringer's  1,000 mls @ 100 mls/hr  09/21/22 06:38  09/21/22 10:30





  Lactated Ringers  IV  10/21/22 06:29  Not Given





  .Q10H JOYCELYN  


 


Metformin HCl  500 mg  09/20/22 22:00  09/20/22 21:28





  Metformin Hcl 500 Mg Tablet  PO  10/20/22 21:59  500 mg





  HS JOYCELYN   Administration


 


Morphine Sulfate  4 mg  09/19/22 22:12  09/19/22 22:31





  Morphine Sulfate 4 Mg/Ml Injection  IV  09/19/22 22:13  4 mg





  STAT ONE   Administration


 


Morphine Sulfate  Confirm  09/19/22 22:25 





  Morphine Sulfate 4 Mg/Ml Injection  Administered  09/19/22 22:26 





  Dose  





  4 mg  





  .ROUTE  





  .STK-MED ONE  


 


Ondansetron HCl  4 mg  09/19/22 22:12  09/19/22 22:32





  Ondansetron Hcl 4 Mg/2 Ml Vial  IV  09/19/22 22:13  4 mg





  STAT ONE   Administration


 


Ondansetron HCl  Confirm  09/19/22 22:24 





  Ondansetron Hcl 4 Mg/2 Ml Vial  Administered  09/19/22 22:25 





  Dose  





  4 mg  





  .ROUTE  





  .STK-MED ONE  


 


Ondansetron HCl  4 mg  09/19/22 23:31  09/20/22 01:53





  Ondansetron Hcl 4 Mg/2 Ml Vial  IV  10/19/22 23:30  4 mg





  Q6H PRN PRN   Administration





  NAUSEA/VOMITING  


 


Ondansetron HCl  Confirm  09/20/22 01:51 





  Ondansetron Hcl 4 Mg/2 Ml Vial  Administered  09/20/22 01:52 





  Dose  





  4 mg  





  .ROUTE  





  .STK-MED ONE  


 


Pantoprazole Sodium  40 mg  09/19/22 23:31  09/20/22 00:27





  Pantoprazole 40 Mg Vial  IV  10/19/22 23:30  40 mg





  Q24H JOYCELYN   Administration


 


Pantoprazole Sodium  40 mg  09/20/22 22:00 





  Pantoprazole 40 Mg Vial  IV  10/19/22 23:30 





  QPM JOYCELYN  








                         Intake & Output (Last 24 hours)











 09/19/22 09/20/22 09/21/22 09/22/22





 11:59 11:59 11:59 11:59


 


Intake Total  540 3084 


 


Output Total  1255 8496 2175


 


Balance  -710 -2741 -2175


 


Weight  146.1 kg 141.9 kg 








                       Laboratory Results (Last 24 hours)











  09/21/22 09/21/22 09/21/22





  16:30 11:22 07:53


 


WBC   


 


RBC   


 


Hgb   


 


Hct   


 


MCV   


 


MCH   


 


MCHC   


 


RDW   


 


Plt Count   


 


MPV   


 


Sodium   


 


Potassium   


 


Chloride   


 


Carbon Dioxide   


 


Anion Gap   


 


BUN   


 


Creatinine   


 


Estimated GFR   


 


Glucose   


 


POC Glucometer  249 H  266 H 


 


Hemoglobin A1c    8.16 H


 


Lactic Acid   


 


Calcium   














  09/21/22 09/21/22 09/21/22





  07:37 05:14 05:14


 


WBC   


 


RBC   


 


Hgb   


 


Hct   


 


MCV   


 


MCH   


 


MCHC   


 


RDW   


 


Plt Count   


 


MPV   


 


Sodium    139


 


Potassium    4.2


 


Chloride    103


 


Carbon Dioxide    25


 


Anion Gap    15.2 H


 


BUN    35 H


 


Creatinine    1.53 H


 


Estimated GFR    47.1


 


Glucose    266 H


 


POC Glucometer  248 H  


 


Hemoglobin A1c   


 


Lactic Acid   2.3 H 


 


Calcium    8.8














  09/21/22





  05:14


 


WBC  9.1


 


RBC  4.37


 


Hgb  11.8 L


 


Hct  39.5 L


 


MCV  90.4


 


MCH  27.0


 


MCHC  29.9 L


 


RDW  14.3 H


 


Plt Count  295


 


MPV  11.9 H


 


Sodium 


 


Potassium 


 


Chloride 


 


Carbon Dioxide 


 


Anion Gap 


 


BUN 


 


Creatinine 


 


Estimated GFR 


 


Glucose 


 


POC Glucometer 


 


Hemoglobin A1c 


 


Lactic Acid 


 


Calcium 








                             Orders (Last 24 hours)











 Category Date Time Status


 


 Ice Chips Only AS TOLERATED Care  09/21/22 13:03 Active


 


 Miscellaneous Nursing Order ROUTINE Care  09/20/22 22:25 Active


 


 POCT Glucose Check ACHS Care  09/21/22 04:12 Active


 


 CHEST 1 VIEW (PORTABLE) Stat Exams  09/21/22 00:04 Completed


 


 CHEST 1 VIEW (PORTABLE) Stat Exams  09/21/22 09:06 Completed


 


 BMP AM.LAB Lab  09/21/22 05:14 Completed


 


 CBC AM.LAB Lab  09/21/22 05:14 Completed


 


 HEMOGLOBIN A1C Urgent Lab  09/21/22 07:53 Completed


 


 Lactic Acid AM.LAB Lab  09/21/22 05:14 Completed


 


 POCT GLUCOSE Stat Lab  09/21/22 07:37 Completed


 


 POCT GLUCOSE Stat Lab  09/21/22 11:22 Completed


 


 POCT GLUCOSE Stat Lab  09/21/22 16:30 Completed


 


 Albuterol/Ipratropium 3ml Neb* [DUONEB 0.5-3 MG/3 ml Med  09/21/22 08:13 

Discontinued





 Neb**]   





 3 ml IH .STK-MED ONE   


 


 Albuterol/Ipratropium 3ml Neb* [DUONEB 0.5-3 MG/3 ml Med  09/21/22 07:00 Active





 Neb**]   





 3 ml IH Q4HRT   


 


 Enoxaparin Sodium*** [Enoxaparin Sodium] Med  09/20/22 22:00 Active





 40 mg SQ HS   


 


 Insulin Lispro [Humalog] Med  09/21/22 07:00 Active





 See Dose Instructions  SQ UD PRN   


 


 Metformin HCl 500 mg*** [Glucophage 500 MG***] Med  09/20/22 22:00 Discontinued





 500 mg PO HS   


 


 Methylprednisolone Sod Suc 40M [solu-MEDROL] 40 mg Med  09/21/22 08:30 Active





 Water For Injection,Sterile [Sterile H2O 10 ml] 1 ml   





 IV Q8HT   


 


 NaCl 0.9% 1000 ml [Sodium Chloride 0.9% 1000 ML] 1,000 Med  09/21/22 08:15 

Active





 ml   





  mls/hr   


 


 Pantoprazole 40 mg*** [Protonix 40 mg IV***] Med  09/20/22 22:00 Discontinued





 40 mg IV QPM   


 


 Ringers Solution,Lactated [Lactated Ringers] 1,000 ml Med  09/21/22 06:38 

Discontinued





  mls/hr   


 


 Simvastatin 20Mg** [Zocor 20Mg**] Med  09/20/22 22:00 Active





 20 mg PO HS   


 


 Tamsulosin HCl 0.4 mg*** [Flomax 0.4 MG***] Med  09/20/22 22:00 Active





 0.8 mg PO HS   


 


 Zolpidem Tartrate 5 mg** [Ambien 5 MG Tablet***] Med  09/20/22 20:44 Active





 5 mg PO HS PRN PRN   








                       Patient Care Notes (Last 24 hours)





09/21/22 11:18 Case Management Note by Haylie Graves


REVIEWED CHART- PATIENT STILL ACUTELY ILL. DO NOT ANTICIPATE ANY CHANGES IN DC 

PLAN AT THIS TIME





Initialized on 09/21/22 11:18 - END OF NOTE








09/21/22 08:23 Respiratory Note by Heena Langley


Patient still complaining of shortness of breath and audible and auscultated I/E

wheezes.  Will order Duoneb via nebulizer Q4 and cancel Albuterol MDI order.  

Dr. Chapman made aware per KATERIN Newton.





Initialized on 09/21/22 08:23 - END OF NOTE








09/21/22 07:09 Nursing Note by Erica Boland


REPORT AND PT CARE GIVEN OVER TO CHLOE CONKLIN





Initialized on 09/21/22 07:09 - END OF NOTE








09/21/22 06:15 (created 09/21/22 06:41) Nursing Note by Erica Boland


@0615 DR. CHAPMAN WAS CALLED AND UPDATED ON PT NG BEING REMOVED @0200 D/T PT 

REFUSAL OF REPLACEMENT, PT DOING WELL AT THIS TIME, NO N/V AND NO FURTHER ABD 

DISTENTION. PT STATES, "I FEEL GOOD RIGHT NOW". MD ALSO UPDATED ON VITALS, 

ALLERGIES, DIET, NO ACCU CHECKS IN PLACE, LAB RESULTS AND LR RUNNING AT 

150ML/HR. NO NEW ORDERS RECEIVED CONCERNING NG TUBE.  DR. CHAPMAN GAVE NEW 

ORDERS FOR: "MAY DECREASE LR TO 100ML/HR IV", "HOLD GLUCOPHAGE ORDER", "ACCU 

CHECKS ACHS" AND "LDSS HUMALOG". ALL ORDERS READ BACK AND VERIFIED.  





Initialized on 09/21/22 06:41 - END OF NOTE








09/21/22 01:56 Nursing Note by Kanika Mohan


Checked CXR results for placement of NG. Results/impression-enteric tube coiled 

within the esophagus with tip in the proxmal thoracic espohagus. Position 

rechecked by Breanne ciara Rn and met resistance. Pt refused for this nurse to 

pull NG and reanchor. So NG pulled and explained to pt that may need to reanchor

if increased nausea/vomiting or abd pain.





Initialized on 09/21/22 01:56 - END OF NOTE








09/21/22 01:13 Nursing Note by Erica Boland


REPORT RECEIVED AND PT CARE TAKEN OVER BY THIS NURSE.





Initialized on 09/21/22 01:13 - END OF NOTE








09/21/22 00:05 Nursing Note by Kanika Mohan


Pt pulled out NG tube. Replaced in right nare with 14fr nasogastric tube. Pt 

tolerated well. Check placement with air bolus. placement confirmed. order for 

cxr to check placemnt.





Initialized on 09/21/22 00:05 - END OF NOTE














Code(s): K56.0 - PARALYTIC ILEUS   





(2) Abdominal pain


Current Visit: Yes   Status: Acute   


Qualifiers: 


   Abdominal location: generalized   Qualified Code(s): R10.84 - Generalized 

abdominal pain   


Code(s): R10.9 - UNSPECIFIED ABDOMINAL PAIN   





(3) Gastroenteritis


Current Visit: Yes   Status: Acute   Code(s): K52.9 - NONINFECTIVE 

GASTROENTERITIS AND COLITIS, UNSPECIFIED

## 2022-09-21 NOTE — XRAY
Indication: NG tube adjustment.



Comparison: One day earlier.



Portable chest demonstrates NG tube now folded in the distal esophagus with

tip directed superiorly at the level of T2.



Lungs less inflated with new minimal bibasilar infiltrates/atelectasis.  Heart

not enlarged again with left pacemaker.



Comment: Preliminary interpretation made by [facility].  No critical discrepancy.

## 2022-09-21 NOTE — XRAY
Indication: NG tube placement.



Comparison: Taken earlier in the day.



Portable chest demonstrates NG tube tip now in gastric lumen directed towards

the left.



Lungs remain slightly underinflated with minimal bibasilar

infiltrates/atelectasis.  Heart not enlarged again with pacemaker.  No new

cardiopulmonary abnormalities.

## 2022-09-21 NOTE — XRAY
Indication: NG tube advancement.



Comparison: Taken earlier in the day.



Limited portable chest demonstrates NG tube advancement with the tip now at

the GE junction.  Recommend advancement at least 10 cm.



Remaining visualized heart and lungs unremarkable again with left pacemaker.

## 2022-09-21 NOTE — CONS
CONSULT DATE:  09/20/2022    



HISTORY:  Dr. Mondragon asked that I consult this patient for question of ileus. The patient 
is a 76-year-old had some nausea and vomiting started yesterday evening. He came in and 
had a CT scan with no free air, collection, had some fluid in the small bowel. The 
radiologist read it as question of ileus or enteritis. His amylase and lipase are okay. 
Total bilirubin 1.6. AST and ALT are okay. White count was 19.4, hemoglobin 12.8, PLT 
count 312,000. He denies any bloody stools.  



PAST MEDICAL HISTORY:  Diabetes mellitus type II. Obesity. Heart disease. He has history 
of atrial fibrillation. He has been on Eliquis. Peripheral neuropathy. History of blood 
clot in the past. He has had pulmonary embolism in the past. Sleep apnea. Depression. 
Prostate problems in the past.



PAST SURGICAL HISTORY:  Cholecystectomy. He has a pacemaker defibrillator in the past. He 
had orthopedic surgery. Dental extractions. Left knee tear in tendon and repair in the 
past. His only abdominal surgery was laparoscopic cholecystectomy. He has history of heart 
ablation in the past. He did have a colonoscopy a year or two ago that was okay according 
to the patient.



FAMILY HISTORY:  Negative in regards to this specific problem.



SOCIAL HISTORY:  Former smoker. No alcohol abuse. 



REVIEW OF SYSTEMS:  Fourteen systems reviewed. No chest pain or palpitations other systems 
negative or noncontributory as above and per preadmission questionnaire. 



PHYSICAL EXAMINATION:  

GENERAL:  No acute distress.

HEENT:  Sclera nonicteric.

NECK:  No JVD.

CHEST:  Equal excursion, nonlabored breathing.

CVS:  Regular rate and rhythm.

ABDOMEN:  Soft.  He is obese. He had some mild distention but no peritoneal signs.

EXTREMITIES:  No cyanosis.

NEURO:  Alert. 

PSYCH: Appropriate mood and affect. 



IMPRESSION:  A 76-year-old with some nausea and vomiting. He had a CT scan show some fluid 
in the small bowel loops and stomach, question ileus versus enteritis. No free air. No 
collections. He did have some vague left aches in the past. Other etiology could include 
some early mild diverticular disease or other etiology. Either way no emergent surgery 
necessary. Radiologist seemed to think this is more of an ileus than enteritis. Continue 
IV antibiotics and Flagyl, continue bowel rest and NG decompression, frequent turning, 
twisting and walking in the halls. I will follow with you. No emergent surgery necessary. 
We are tied up in [place] most of the day tomorrow. I will call and check on him to 
see if he needs to be re-evaluated.  If he fails to improve I will see him on Thursday.  
Otherwise, no emergent surgery needed, continue medical management, bowel rest at this 
time.

## 2022-09-22 LAB
ANION GAP SERPL CALC-SCNC: 14.7 MEQ/L (ref 5–15)
BUN SERPL-MCNC: 33 MG/DL (ref 9–20)
CALCIUM SPEC-MCNC: 8.6 MG/DL (ref 8.4–10.2)
CHLORIDE SERPL-SCNC: 105 MMOL/L (ref 98–107)
CO2 SERPL-SCNC: 28 MMOL/L (ref 22–30)
CREAT SERPL-MCNC: 1.25 MG/DL (ref 0.66–1.25)
GFR SERPLBLD BASED ON 1.73 SQ M-ARVRAT: 59.5 ML/MIN
GLUCOSE SERPL-MCNC: 251 MG/DL (ref 74–106)
HCT VFR BLD AUTO: 40.6 % (ref 42–50)
HGB BLD-MCNC: 12.3 G/DL (ref 12.5–18)
MCH RBC QN AUTO: 27.6 PG (ref 26–32)
MCHC RBC AUTO-ENTMCNC: 30.3 G/DL (ref 32–36)
PLATELET # BLD AUTO: 286 X10^3/UL (ref 150–450)
POTASSIUM SERPLBLD-SCNC: 4.4 MMOL/L (ref 3.5–5.1)
RBC # BLD AUTO: 4.45 X10^6/UL (ref 4.1–5.6)
SODIUM SERPL-SCNC: 143 MMOL/L (ref 137–145)
WBC # BLD AUTO: 8.8 X10^3/UL (ref 4–10.5)

## 2022-09-22 RX ADMIN — METRONIDAZOLE SCH MLS/HR: 500 INJECTION, SOLUTION INTRAVENOUS at 05:32

## 2022-09-22 RX ADMIN — METRONIDAZOLE SCH MLS/HR: 500 INJECTION, SOLUTION INTRAVENOUS at 23:49

## 2022-09-22 RX ADMIN — LOSARTAN POTASSIUM SCH MG: 50 TABLET, FILM COATED ORAL at 09:06

## 2022-09-22 RX ADMIN — SIMVASTATIN SCH MG: 20 TABLET, FILM COATED ORAL at 21:45

## 2022-09-22 RX ADMIN — WATER SCH MG: 1 INJECTION INTRAMUSCULAR; INTRAVENOUS; SUBCUTANEOUS at 21:45

## 2022-09-22 RX ADMIN — FAMOTIDINE SCH MG: 20 TABLET, FILM COATED ORAL at 09:06

## 2022-09-22 RX ADMIN — METOPROLOL SUCCINATE SCH MG: 50 TABLET, EXTENDED RELEASE ORAL at 09:07

## 2022-09-22 RX ADMIN — METRONIDAZOLE SCH MLS/HR: 500 INJECTION, SOLUTION INTRAVENOUS at 17:05

## 2022-09-22 RX ADMIN — TAMSULOSIN HYDROCHLORIDE SCH MG: 0.4 CAPSULE ORAL at 21:44

## 2022-09-22 RX ADMIN — Medication SCH MG: at 09:07

## 2022-09-22 RX ADMIN — Medication SCH U: at 09:08

## 2022-09-22 RX ADMIN — THERA TABS SCH TAB: TAB at 09:07

## 2022-09-22 RX ADMIN — WATER SCH MG: 1 INJECTION INTRAMUSCULAR; INTRAVENOUS; SUBCUTANEOUS at 14:33

## 2022-09-22 RX ADMIN — INSULIN LISPRO PRN UNIT: 100 INJECTION, SOLUTION INTRAVENOUS; SUBCUTANEOUS at 11:26

## 2022-09-22 RX ADMIN — IPRATROPIUM BROMIDE AND ALBUTEROL SULFATE SCH ML: .5; 3 SOLUTION RESPIRATORY (INHALATION) at 22:25

## 2022-09-22 RX ADMIN — METOPROLOL SUCCINATE SCH MG: 25 TABLET, EXTENDED RELEASE ORAL at 09:07

## 2022-09-22 RX ADMIN — PAROXETINE HYDROCHLORIDE SCH MG: 20 TABLET, FILM COATED ORAL at 09:07

## 2022-09-22 RX ADMIN — IPRATROPIUM BROMIDE AND ALBUTEROL SULFATE SCH ML: .5; 3 SOLUTION RESPIRATORY (INHALATION) at 11:29

## 2022-09-22 RX ADMIN — LEVOFLOXACIN SCH MLS/HR: 5 INJECTION, SOLUTION INTRAVENOUS at 09:06

## 2022-09-22 RX ADMIN — PANTOPRAZOLE SODIUM SCH MG: 40 TABLET, DELAYED RELEASE ORAL at 09:07

## 2022-09-22 RX ADMIN — METOPROLOL SUCCINATE SCH MG: 25 TABLET, EXTENDED RELEASE ORAL at 21:45

## 2022-09-22 RX ADMIN — AMLODIPINE BESYLATE SCH MG: 5 TABLET ORAL at 09:06

## 2022-09-22 RX ADMIN — IPRATROPIUM BROMIDE AND ALBUTEROL SULFATE SCH ML: .5; 3 SOLUTION RESPIRATORY (INHALATION) at 07:24

## 2022-09-22 RX ADMIN — INSULIN LISPRO PRN UNIT: 100 INJECTION, SOLUTION INTRAVENOUS; SUBCUTANEOUS at 21:45

## 2022-09-22 RX ADMIN — IPRATROPIUM BROMIDE AND ALBUTEROL SULFATE SCH ML: .5; 3 SOLUTION RESPIRATORY (INHALATION) at 19:42

## 2022-09-22 RX ADMIN — ENOXAPARIN SODIUM SCH MG: 100 INJECTION SUBCUTANEOUS at 21:44

## 2022-09-22 RX ADMIN — IPRATROPIUM BROMIDE AND ALBUTEROL SULFATE SCH ML: .5; 3 SOLUTION RESPIRATORY (INHALATION) at 03:06

## 2022-09-22 RX ADMIN — METRONIDAZOLE SCH MLS/HR: 500 INJECTION, SOLUTION INTRAVENOUS at 12:46

## 2022-09-22 RX ADMIN — Medication SCH: at 21:44

## 2022-09-22 RX ADMIN — WATER SCH MG: 1 INJECTION INTRAMUSCULAR; INTRAVENOUS; SUBCUTANEOUS at 05:32

## 2022-09-22 RX ADMIN — IPRATROPIUM BROMIDE AND ALBUTEROL SULFATE SCH ML: .5; 3 SOLUTION RESPIRATORY (INHALATION) at 14:33

## 2022-09-22 RX ADMIN — METOPROLOL SUCCINATE SCH MG: 50 TABLET, EXTENDED RELEASE ORAL at 21:45

## 2022-09-22 RX ADMIN — CYANOCOBALAMIN TAB 500 MCG SCH MCG: 500 TAB at 09:06

## 2022-09-22 NOTE — CONS
CONSULT DATE:  09/22/2022    



REASON FOR CONSULT:   Possible small bowel obstruction. 



HISTORY:  Today is Thursday and this process started on Monday. On Monday he got up and he 
ate some salmon cakes which is one of his favorites and actually a little bit before 
eating his salmon cakes he had some booming-like pain in his abdomen and then it got worse 
and it moved through to his kidney. He then developed nausea. He presented to the 
hospital. He had some morphine. He had some vomiting. He had no diarrhea. His wife is not 
sick. They have not eaten anything unusual. He had hemostasis the night before. He had not 
been around anybody with any illnesses. Both he and his wife had significant COVID back in 
December and I think she had it severe and he had it fairly mildly. He had a normal bowel 
movement on Monday evening but it seemed just as little bit light. He had no gas after 
that until when I walked in the room around 12:00 here on Thursday at [facility]. 



He had a nasogastric tube in and out here at the hospital but it had quite a bit of fluid 
in the last few hours, it has produced like 1200 cc. It has a touch of green but is 
primarily clear. His abdomen is moderately distended but there are no peritoneal signs. He 
does not appear to be toxic. He appears to be well hydrated. He has multiple medical 
problems. The consideration has been to transfer him to another hospital for surgical 
intervention. 



Additional history, the patient had laparoscopic cholecystectomy by Dr. Diamond some 15-20 
years ago and did well from this. It was his only abdominal surgery. His chart is 
reviewed. He does not have any specific reason to have a bowel obstruction. He did have a 
colonoscopy about two years ago and he had a PillCam looking for bleeding about a year and 
a half ago. 



IMPRESSION:  In general this gentleman is fairly up-to-date with diagnostic tests and 
studies. He is starting to pass gas. We will go ahead and get a small bowel follow through 
tomorrow morning. If this is clear, I think we can start full liquids and advance his 
diet. I think this is going to resolve. I do not know whether this is severe ileus from a 
viral illness. I certainly do not think it is a food poisoning-type of issue as he never 
had any diarrhea. 



PLAN:  Small bowel follow through and disposition to follow. He can stay here at [facility]. If for some reason they change their mind and start thinking 
about surgery he should transfer to [facility]

## 2022-09-22 NOTE — PCM.NOTE
Date and Time: 09/22/22 1855





Subjective Assessment: 





doing better





- Review of Systems


Constitutional: No Fever, No Chills


Eyes: No Symptoms


Ears, Nose, & Throat: No Symptoms


Respiratory: No Cough, No Short Of Breath


Cardiac: No Chest Pain, No Edema, No Syncope


Abdominal/Gastrointestinal: Other (abdominal distension), No Abdominal Pain, No 

Nausea, No Vomiting, No Diarrhea


Genitourinary Symptoms: No Dysuria


Musculoskeletal: No Back Pain, No Neck Pain


Skin: No Rash


Neurological: No Dizziness, No Focal Weakness, No Sensory Changes


Psychological: No Symptoms


Endocrine: No Symptoms


Hematologic/Lymphatic: No Symptoms


Immunological/Allergic: No Symptoms





Objective Exam


General Appearance: no apparent distress, alert


Neurologic Exam: alert, oriented x 3, cooperative, normal mood/affect, nml 

cerebellar function, sensation nml, No motor deficits


Skin Exam: normal color, warm, dry


Eye Exam: PERRL, EOMI, eyes nml inspection


Ears, Nose, Throat Exam: normal ENT inspection, pharynx normal, moist mucous 

membranes


Neck Exam: normal inspection, non-tender, supple, full range of motion


Respiratory Exam: normal breath sounds, lungs clear, No respiratory distress


Cardiovascular Exam: regular rate/rhythm, normal heart sounds


Gastrointestinal/Abdomen Exam: soft, distention, other, No tenderness, No mass


Extremity Exam: normal inspection, normal range of motion


Back Exam: normal inspection, normal range of motion, No CVA tenderness, No 

vertebral tenderness


Male Genitalia Exam: deferred


Rectal Exam: deferred





OBJECTIVE DATA


Vital Signs: 


                               Vital Signs - 24 hr











  Temp Pulse Resp BP Pulse Ox


 


 09/22/22 16:00  97.8 F  71  16  127/60  94 L


 


 09/22/22 14:35   76  20   92 L


 


 09/22/22 11:32   78  18   94 L


 


 09/22/22 11:25  96.6 F  78  18  169/67  93 L


 


 09/22/22 07:28   90  18   93 L


 


 09/22/22 06:35  96.9 F  96 H  18  142/69  93 L


 


 09/22/22 04:00  97.7 F  86  22  167/78  93 L


 


 09/22/22 03:06   78  18   92 L


 


 09/21/22 23:35  97.7 F  78  19  144/69  94 L


 


 09/21/22 22:33   78  18   93 L


 


 09/21/22 20:00  96.6 F  76  19  144/73  94 L


 


 09/21/22 19:29   85  16   92 L








                        Pain Assessment - Last Documented











Pain Intensity                 0


 


Pain Scale Used                0-10 Pain Scale











Intake and Output: 


                                 Intake & Output











 09/20/22 09/21/22 09/22/22 09/23/22





 11:59 11:59 11:59 11:59


 


Intake Total 540 3086 1936 


 


Output Total 9207 3490 3862 3254


 


Balance -798 -3443 -8048 -1697


 


Weight 146.1 kg 141.9 kg 143 kg 











Lab Results: 


                            Lab Results-Last 24 Hours











  09/21/22 09/22/22 09/22/22 Range/Units





  20:56 07:17 08:42 


 


WBC    8.8  (4.0-10.5)  x10^3/uL


 


RBC    4.45  (4.1-5.6)  x10^6/uL


 


Hgb    12.3 L  (12.5-18.0)  g/dL


 


Hct    40.6 L  (42-50)  %


 


MCV    91.2  ()  fL


 


MCH    27.6  (26-32)  pg


 


MCHC    30.3 L  (32-36)  g/dL


 


RDW    14.3 H  (11.5-14.0)  %


 


Plt Count    286  (150-450)  x10^3/uL


 


MPV    11.6 H  (7.5-11.0)  fL


 


Sodium     (137-145)  mmol/L


 


Potassium     (3.5-5.1)  mmol/L


 


Chloride     ()  mmol/L


 


Carbon Dioxide     (22-30)  mmol/L


 


Anion Gap     (5-15)  MEQ/L


 


BUN     (9-20)  mg/dL


 


Creatinine     (0.66-1.25)  mg/dL


 


Estimated GFR     ML/MIN


 


Glucose     ()  mg/dL


 


POC Glucometer  238 H  209 H   (74 to 106)  mg/dL


 


Calcium     (8.4-10.2)  mg/dL














  09/22/22 09/22/22 09/22/22 Range/Units





  08:42 11:13 16:47 


 


WBC     (4.0-10.5)  x10^3/uL


 


RBC     (4.1-5.6)  x10^6/uL


 


Hgb     (12.5-18.0)  g/dL


 


Hct     (42-50)  %


 


MCV     ()  fL


 


MCH     (26-32)  pg


 


MCHC     (32-36)  g/dL


 


RDW     (11.5-14.0)  %


 


Plt Count     (150-450)  x10^3/uL


 


MPV     (7.5-11.0)  fL


 


Sodium  143    (137-145)  mmol/L


 


Potassium  4.4    (3.5-5.1)  mmol/L


 


Chloride  105    ()  mmol/L


 


Carbon Dioxide  28    (22-30)  mmol/L


 


Anion Gap  14.7    (5-15)  MEQ/L


 


BUN  33 H    (9-20)  mg/dL


 


Creatinine  1.25    (0.66-1.25)  mg/dL


 


Estimated GFR  59.5    ML/MIN


 


Glucose  251 H    ()  mg/dL


 


POC Glucometer   233 H  191 H  (74 to 106)  mg/dL


 


Calcium  8.6    (8.4-10.2)  mg/dL











Radiology Exams: 


                              Radiology Procedures











 Category Date Time Status


 


 CHEST 1 VIEW (PORTABLE) Stat Exams  09/21/22 00:04 Completed


 


 CHEST 1 VIEW (PORTABLE) Stat Exams  09/21/22 09:06 Completed


 


 SMALL BOWEL FOLLOWING UGI Routine Exams  09/23/22 08:00 Ordered











Multi-Disciplinary Progress Notes: 


                        Multi-Disciplinary Progress Notes





09/22/22 09:48 Case Management Note by Haylie Graves


PATIENT STILL ACUTELY ILL- NG IN PLACE. 





Initialized on 09/22/22 09:48 - END OF NOTE

















Assessment/Plan


(1) Paralytic ileus


Current Visit: Yes   Status: Acute   


Assessment & Plan: 


                                 Chief Complaint





Diagnosis                        abdominal pain, nausea and vomiting for 1 day





                                    Allergies











Allergy/AdvReac Type Severity Reaction Status Date / Time


 


Sulfa (Sulfonamide Allergy Severe  Verified 09/19/22 19:49





Antibiotics)     


 


codeine Allergy Intermediate Nausea and Verified 09/19/22 19:49





   Vomiting  


 


cephalexin [From Keflex] Allergy Unknown  Verified 09/19/22 19:49








                           Vital Signs (Last 24 hours)











  Temp Pulse Resp BP Pulse Ox


 


 09/22/22 16:00  97.8 F  71  16  127/60  94 L


 


 09/22/22 14:35   76  20   92 L


 


 09/22/22 11:32   78  18   94 L


 


 09/22/22 11:25  96.6 F  78  18  169/67  93 L


 


 09/22/22 07:28   90  18   93 L


 


 09/22/22 06:35  96.9 F  96 H  18  142/69  93 L


 


 09/22/22 04:00  97.7 F  86  22  167/78  93 L


 


 09/22/22 03:06   78  18   92 L


 


 09/21/22 23:35  97.7 F  78  19  144/69  94 L


 


 09/21/22 22:33   78  18   93 L


 


 09/21/22 20:00  96.6 F  76  19  144/73  94 L


 


 09/21/22 19:29   85  16   92 L








                                Home Medications











 Medication  Instructions  Recorded  Confirmed  Last Taken  Type


 


Albuterol Sulfate [Proair Hfa] 8.5 gm IH Q4-6HPRN PRN 09/19/22 09/19/22 Unknown 

History


 


Amlodipine Besylate [Norvasc] 2.5 mg PO DAILY 09/19/22 09/19/22 09/19/22 09:00 

History


 


Omega-3/Dha/Epa/Fish Oil [Fish Oil 4,000 mg PO BID 09/19/22 09/19/22 09/19/22 

18:00 History





1,000 mg Softgel]     








                               Current Medications











Generic Name Dose Route Start Last Admin





  Trade Name Cyndi  PRN Reason Stop Dose Admin


 


Acetaminophen  650 mg  09/19/22 23:31  09/20/22 21:28





  Acetaminophen 325 Mg Tablet  PO  10/19/22 23:30  650 mg





  Q4H PRN PRN   Administration





  PAIN, FEVER, HEADACHE  


 


Albuterol/Ipratropium  3 ml  09/21/22 07:00  09/22/22 14:33





  Ipratropium/Albuterol Sulfate 3 Ml Ampul.Neb  IH  10/21/22 06:59  3 ml





  Q4HRT JOYCELYN   Administration


 


Amlodipine Besylate  2.5 mg  09/20/22 13:00  09/22/22 09:06





  Amlodipine Besylate 5 Mg Tablet  PO  10/20/22 12:59  2.5 mg





  DAILY JOYCELYN   Administration


 


Methylprednisolone Sodium  0 mg  09/21/22 08:30  09/22/22 14:33





Succinate 40 mg/ Sterile Water  IV  10/21/22 08:29  40 mg





1 ml  Q8HT JOYCELYN   Administration


 


Cyanocobalamin  1,000 mcg  09/20/22 13:00  09/22/22 09:06





  Cyanocobalamin 500 Mcg Tablet  PO  10/20/22 12:59  1,000 mcg





  DAILY JOYCELYN   Administration


 


Enoxaparin Sodium  40 mg  09/20/22 22:00  09/21/22 21:37





  Enoxaparin Sodium*** 40 Mg/0.4 Ml Syringe  SQ  10/20/22 21:59  40 mg





  HS JOYCELYN   Administration


 


Famotidine  20 mg  09/20/22 13:00  09/22/22 09:06





  Famotidine 20 Mg Tablet  PO  10/20/22 12:59  20 mg





  DAILY JOYCELYN   Administration


 


Fish Oil  4,000 mg  09/20/22 13:00  09/22/22 09:07





  Omega-3 Fatty Acids/Fish Oil 1000 Mg Capsule  PO  10/20/22 12:59  4,000 mg





  BID JOYCELYN   Administration


 


Metronidazole  500 mg in 100 mls @ 200 mls/hr  09/20/22 00:00  09/22/22 17:05





  Flagyl 500 Mg Ivpb  IV  10/20/22 00:00  200 mls/hr





  Q6HT JOYCELYN   Administration


 


Promethazine HCl 25 mg/ Sodium  101 mls @ 200 mls/hr  09/20/22 12:03  09/20/22 

12:41





  Chloride  IV  10/20/22 12:02  200 mls/hr





  Q4H PRN PRN   Administration





  UNCONTROLLED NAUSEA  


 


Levofloxacin/Dextrose  500 mg in 100 mls @ 100 mls/hr  09/20/22 14:00  09/22/22 

09:06





  Levofloxacin 500mg/100ml D5w  IV  10/20/22 13:59  100 mls/hr





  DAILY JOYCELYN   Administration


 


Sodium Chloride  1,000 mls @ 150 mls/hr  09/21/22 08:15  09/22/22 14:33





  Sodium Chloride 0.9% 1000 Ml  IV  10/21/22 08:14  150 mls/hr





  .Q6H40M JOYCELYN   Administration


 


Insulin Human Lispro  0 unit  09/21/22 07:00  09/22/22 11:26





  Insulin Lispro 1 Unit  SQ  10/21/22 06:59  2 unit





  UD PRN   Administration





  HYPERGLYCEMIA  


 


Losartan Potassium  50 mg  09/20/22 13:00  09/22/22 09:06





  Losartan Potassium 50 Mg Tablet  PO  10/20/22 12:59  50 mg





  DAILY JOYCELYN   Administration


 


Metoprolol Succinate  50 mg  09/20/22 13:00  09/22/22 09:07





  Metoprolol Succinate 50 Mg Tablet.Sa  PO  10/20/22 12:59  50 mg





  BID JOYCELYN   Administration


 


Metoprolol Succinate  25 mg  09/20/22 13:00  09/22/22 09:07





  Metoprolol Succinate 25 Mg Xl Tab  PO  10/20/22 12:59  25 mg





  BID JOYCELYN   Administration


 


Miscellaneous Information  1 each  09/20/22 13:00 





  Medication Intervention 1 Each Each    10/20/22 12:59 





  .RN TO CHECK JOYCELYN  


 


Miscellaneous Information  1 each  09/20/22 13:00 





  Medication Intervention 1 Each Each    10/20/22 12:59 





  .RN TO CHECK JOYCELYN  


 


Morphine Sulfate  4 mg  09/19/22 23:31  09/20/22 01:55





  Morphine Sulfate 4 Mg/Ml Injection  IV  09/24/22 23:30  4 mg





  Q6H PRN PRN   Administration





  PAIN  


 


Multivitamins Therapeutic  1 tab  09/20/22 13:00  09/22/22 09:07





  Multivitamins,Therapeutic 1 Tab Tab  PO  10/20/22 12:59  1 tab





  DAILY JOYCELYN   Administration


 


Ondansetron HCl  4 mg  09/20/22 05:25  09/20/22 09:30





  Ondansetron Hcl 4 Mg/2 Ml Vial  IV  10/20/22 05:22  4 mg





  Q4H PRN PRN   Administration





  NAUSEA/VOMITING  


 


Pantoprazole Sodium  40 mg  09/20/22 13:00  09/22/22 09:07





  Protonix (Pantoprazole) 40 Mg Tablet  PO  10/20/22 12:59  40 mg





  DAILY JOYCELYN   Administration


 


Paroxetine HCl  20 mg  09/20/22 13:00  09/22/22 09:07





  Paroxetine Hcl 20 Mg Tablet  PO  10/20/22 12:59  20 mg





  DAILY JOYCELYN   Administration


 


Pyridoxine HCl  100 mg  09/20/22 13:00  09/22/22 09:07





  Pyridoxine Hcl 100 Mg Tablet  PO  10/20/22 12:59  100 mg





  DAILY JOYCELYN   Administration


 


Senna  8.6 mg  09/20/22 12:29  09/20/22 21:26





  Senna 8.6 Mg Tablet  PO  10/20/22 12:28  8.6 mg





  DAILY PRN PRN   Administration





  CONSTIPATION  


 


Simvastatin  20 mg  09/20/22 22:00  09/21/22 21:38





  Simvastatin 20 Mg Tablet  PO  10/20/22 21:59  20 mg





  HS JOYCELYN   Administration


 


Tamsulosin HCl  0.8 mg  09/20/22 22:00  09/21/22 21:37





  Tamsulosin Hcl 0.4 Mg Cap  PO  10/20/22 21:59  0.8 mg





  HS JOYCELYN   Administration


 


Vitamin E  400 u  09/20/22 13:00  09/22/22 09:08





  Vitamin E 400 Iu Softgel  PO  10/20/22 12:59  400 u





  DAILY JOYCELYN   Administration


 


Zolpidem Tartrate  5 mg  09/20/22 20:44  09/20/22 21:27





  Zolpidem Tartrate 5 Mg Tab  PO  10/20/22 20:43  5 mg





  HS PRN PRN   Administration





  INSOMNIA  














Discontinued Medications














Generic Name Dose Route Start Last Admin





  Trade Name Freq  PRN Reason Stop Dose Admin


 


Albuterol Sulfate  4 puff  09/20/22 07:00  09/20/22 11:11





  Albuterol Common Canister Inhaler    10/20/22 06:59  4 puff





  QIDRT JOYCELYN   Administration


 


Albuterol Sulfate  2 puff  09/20/22 11:36  09/21/22 07:10





  Albuterol Common Canister Inhaler  IH  10/20/22 06:59  2 puff





  QIDRT JOYCELYN   Administration


 


Albuterol/Ipratropium  Confirm  09/21/22 08:13 





  Ipratropium/Albuterol Sulfate 3 Ml Ampul.Neb  Administered  09/21/22 08:14 





  Dose  





  3 ml  





  IH  





  .STK-MED ONE  


 


Apixaban  5 mg  09/20/22 13:00  09/20/22 13:41





  Apixaban 2.5 Mg Tablet  PO  10/20/22 12:59  Not Given





  BID JOYCELYN  


 


Sodium Chloride  1,000 mls @ 999 mls/hr  09/19/22 20:59  09/19/22 21:02





  Sodium Chloride 0.9% 1000 Ml  IV  09/19/22 21:59  999 mls/hr





  .Q1H1M STA   Administration


 


Sodium Chloride  Confirm  09/19/22 21:01 





  Sodium Chloride 0.9% 1000 Ml  Administered  09/19/22 21:02 





  Dose  





  1,000 mls @ ud  





  .ROUTE  





  .STK-MED ONE  


 


Metronidazole  500 mg in 100 mls @ 200 mls/hr  09/19/22 22:16  09/19/22 22:35





  Flagyl 500 Mg Ivpb  IV  09/19/22 22:45  200 ml/hr





  STAT STA   200 mls/hr





    Administration


 


Metronidazole  Confirm  09/19/22 22:25 





  Flagyl 500 Mg Ivpb  Administered  09/19/22 22:26 





  Dose  





  500 mg in 100 mls @ ud  





  IV  





  .STK-MED ONE  


 


Sodium Chloride  1,000 mls @ 999 mls/hr  09/19/22 23:02  09/19/22 23:04





  Sodium Chloride 0.9% 1000 Ml  IV  09/20/22 00:02  999 mls/hr





  .Q1H1M STA   Administration


 


Sodium Chloride  Confirm  09/19/22 23:03 





  Sodium Chloride 0.9% 1000 Ml  Administered  09/19/22 23:04 





  Dose  





  1,000 mls @ ud  





  .ROUTE  





  .STK-MED ONE  


 


Sodium Chloride  1,000 mls @ 100 mls/hr  09/19/22 23:31  09/20/22 00:16





  Sodium Chloride 0.45% 1000 Ml  IV  10/19/22 23:30  100 mls/hr





  .Q10H JOYCELYN   Administration


 


Lactated Ringer's  1,000 mls @ 150 mls/hr  09/20/22 05:30  09/21/22 06:33





  Lactated Ringers  IV  10/20/22 05:29  100 mls/hr





  .Q6H40M JOYCELYN   Infusion


 


Sodium Chloride  Confirm  09/20/22 00:15 





  Sodium Chloride 0.45% 1000 Ml  Administered  09/20/22 00:16 





  Dose  





  1,000 mls @ ud  





  IV  





  .STK-MED ONE  


 


Lactated Ringer's  1,000 mls @ 100 mls/hr  09/21/22 06:38  09/21/22 10:30





  Lactated Ringers  IV  10/21/22 06:29  Not Given





  .Q10H JOYCELYN  


 


Metformin HCl  500 mg  09/20/22 22:00  09/20/22 21:28





  Metformin Hcl 500 Mg Tablet  PO  10/20/22 21:59  500 mg





  HS JOYCELYN   Administration


 


Morphine Sulfate  4 mg  09/19/22 22:12  09/19/22 22:31





  Morphine Sulfate 4 Mg/Ml Injection  IV  09/19/22 22:13  4 mg





  STAT ONE   Administration


 


Morphine Sulfate  Confirm  09/19/22 22:25 





  Morphine Sulfate 4 Mg/Ml Injection  Administered  09/19/22 22:26 





  Dose  





  4 mg  





  .ROUTE  





  .STK-MED ONE  


 


Ondansetron HCl  4 mg  09/19/22 22:12  09/19/22 22:32





  Ondansetron Hcl 4 Mg/2 Ml Vial  IV  09/19/22 22:13  4 mg





  STAT ONE   Administration


 


Ondansetron HCl  Confirm  09/19/22 22:24 





  Ondansetron Hcl 4 Mg/2 Ml Vial  Administered  09/19/22 22:25 





  Dose  





  4 mg  





  .ROUTE  





  .STK-MED ONE  


 


Ondansetron HCl  4 mg  09/19/22 23:31  09/20/22 01:53





  Ondansetron Hcl 4 Mg/2 Ml Vial  IV  10/19/22 23:30  4 mg





  Q6H PRN PRN   Administration





  NAUSEA/VOMITING  


 


Ondansetron HCl  Confirm  09/20/22 01:51 





  Ondansetron Hcl 4 Mg/2 Ml Vial  Administered  09/20/22 01:52 





  Dose  





  4 mg  





  .ROUTE  





  .STK-MED ONE  


 


Pantoprazole Sodium  40 mg  09/19/22 23:31  09/20/22 00:27





  Pantoprazole 40 Mg Vial  IV  10/19/22 23:30  40 mg





  Q24H JOYCELYN   Administration


 


Pantoprazole Sodium  40 mg  09/20/22 22:00 





  Pantoprazole 40 Mg Vial  IV  10/19/22 23:30 





  QPM JOYCELYN  








                         Intake & Output (Last 24 hours)











 09/20/22 09/21/22 09/22/22 09/23/22





 11:59 11:59 11:59 11:59


 


Intake Total 540 3084 1936 


 


Output Total 2782 5843 3474 1900


 


Balance -710 -2741 -1539 -1900


 


Weight 146.1 kg 141.9 kg 143 kg 








                       Laboratory Results (Last 24 hours)











  09/22/22 09/22/22 09/22/22





  16:47 11:13 08:42


 


WBC   


 


RBC   


 


Hgb   


 


Hct   


 


MCV   


 


MCH   


 


MCHC   


 


RDW   


 


Plt Count   


 


MPV   


 


Sodium    143


 


Potassium    4.4


 


Chloride    105


 


Carbon Dioxide    28


 


Anion Gap    14.7


 


BUN    33 H


 


Creatinine    1.25


 


Estimated GFR    59.5


 


Glucose    251 H


 


POC Glucometer  191 H  233 H 


 


Calcium    8.6














  09/22/22 09/22/22 09/21/22





  08:42 07:17 20:56


 


WBC  8.8  


 


RBC  4.45  


 


Hgb  12.3 L  


 


Hct  40.6 L  


 


MCV  91.2  


 


MCH  27.6  


 


MCHC  30.3 L  


 


RDW  14.3 H  


 


Plt Count  286  


 


MPV  11.6 H  


 


Sodium   


 


Potassium   


 


Chloride   


 


Carbon Dioxide   


 


Anion Gap   


 


BUN   


 


Creatinine   


 


Estimated GFR   


 


Glucose   


 


POC Glucometer   209 H  238 H


 


Calcium   








                             Orders (Last 24 hours)











 Category Date Time Status


 


 SMALL BOWEL FOLLOWING UGI Routine Exams  09/23/22 08:00 Ordered


 


 BMP Urgent Lab  09/22/22 08:42 Completed


 


 CBC Urgent Lab  09/22/22 08:42 Completed


 


 POCT GLUCOSE Stat Lab  09/21/22 20:56 Completed


 


 POCT GLUCOSE Stat Lab  09/22/22 07:17 Completed


 


 POCT GLUCOSE Stat Lab  09/22/22 11:13 Completed


 


 POCT GLUCOSE Stat Lab  09/22/22 16:47 Completed


 


 Flutter Therapy UD RT  09/22/22 14:47 Active








                       Patient Care Notes (Last 24 hours)





09/22/22 14:33 Nursing Note by Chely Dukes Dr came in and saw the patient stated that he does not need surgery, he is

to have Upper GI with small bowel follow through tomorrow 09/22/2022 . Can use 

the NG for the contrast. KATERIN Russell contacted Dr Mondragon and notified. 

Contacted the [place] transfer center spoke with Álvaro and cancelled transfer to

[place] at this time. 





Initialized on 09/22/22 14:33 - END OF NOTE








09/22/22 13:09 (created 09/22/22 13:13) Nursing Note by Chely Dukes


Contacted [facility] to have patient transferred up to [place]. Spoke 

with Álvaro who stated she would speak with the hospitalist and the surgeon and

have them contact Dr. Mondragon and call us back with an update. 





Initialized on 09/22/22 13:13 - END OF NOTE








09/22/22 09:48 Case Management Note by Haylie Graves


PATIENT STILL ACUTELY ILL- NG IN PLACE. 





Initialized on 09/22/22 09:48 - END OF NOTE














Code(s): K56.0 - PARALYTIC ILEUS   





(2) Abdominal pain


Current Visit: Yes   Status: Acute   


Qualifiers: 


   Abdominal location: generalized   Qualified Code(s): R10.84 - Generalized 

abdominal pain   


Code(s): R10.9 - UNSPECIFIED ABDOMINAL PAIN   





(3) Gastroenteritis


Current Visit: Yes   Status: Acute   Code(s): K52.9 - NONINFECTIVE 

GASTROENTERITIS AND COLITIS, UNSPECIFIED

## 2022-09-23 LAB
ALBUMIN SERPL-MCNC: 4.1 G/DL (ref 3.5–5)
ALP SERPL-CCNC: 75 U/L (ref 38–126)
ALT SERPL-CCNC: 37 U/L (ref 0–50)
ANION GAP SERPL CALC-SCNC: 12.1 MEQ/L (ref 5–15)
AST SERPL QL: 45 U/L (ref 17–59)
BILIRUB BLD-MCNC: 0.8 MG/DL (ref 0.2–1.3)
BUN SERPL-MCNC: 29 MG/DL (ref 9–20)
CALCIUM SPEC-MCNC: 8.6 MG/DL (ref 8.4–10.2)
CHLORIDE SERPL-SCNC: 106 MMOL/L (ref 98–107)
CO2 SERPL-SCNC: 28 MMOL/L (ref 22–30)
CREAT SERPL-MCNC: 1.04 MG/DL (ref 0.66–1.25)
GFR SERPLBLD BASED ON 1.73 SQ M-ARVRAT: > 60 ML/MIN
GLUCOSE SERPL-MCNC: 239 MG/DL (ref 74–106)
HCT VFR BLD AUTO: 40.5 % (ref 42–50)
HGB BLD-MCNC: 12.6 G/DL (ref 12.5–18)
MCH RBC QN AUTO: 27.6 PG (ref 26–32)
MCHC RBC AUTO-ENTMCNC: 31.1 G/DL (ref 32–36)
PLATELET # BLD AUTO: 300 X10^3/UL (ref 150–450)
POTASSIUM SERPLBLD-SCNC: 4.4 MMOL/L (ref 3.5–5.1)
PROT SERPL-MCNC: 6.6 G/DL (ref 6.3–8.2)
RBC # BLD AUTO: 4.56 X10^6/UL (ref 4.1–5.6)
SODIUM SERPL-SCNC: 142 MMOL/L (ref 137–145)
WBC # BLD AUTO: 9.1 X10^3/UL (ref 4–10.5)

## 2022-09-23 RX ADMIN — WATER SCH MG: 1 INJECTION INTRAMUSCULAR; INTRAVENOUS; SUBCUTANEOUS at 05:00

## 2022-09-23 RX ADMIN — Medication SCH U: at 12:58

## 2022-09-23 RX ADMIN — Medication SCH MG: at 21:39

## 2022-09-23 RX ADMIN — IPRATROPIUM BROMIDE AND ALBUTEROL SULFATE SCH ML: .5; 3 SOLUTION RESPIRATORY (INHALATION) at 14:45

## 2022-09-23 RX ADMIN — METOPROLOL SUCCINATE SCH MG: 25 TABLET, EXTENDED RELEASE ORAL at 21:40

## 2022-09-23 RX ADMIN — FAMOTIDINE SCH MG: 20 TABLET, FILM COATED ORAL at 12:52

## 2022-09-23 RX ADMIN — IPRATROPIUM BROMIDE AND ALBUTEROL SULFATE SCH ML: .5; 3 SOLUTION RESPIRATORY (INHALATION) at 18:36

## 2022-09-23 RX ADMIN — Medication SCH MG: at 12:57

## 2022-09-23 RX ADMIN — METRONIDAZOLE SCH MLS/HR: 500 INJECTION, SOLUTION INTRAVENOUS at 05:00

## 2022-09-23 RX ADMIN — PAROXETINE HYDROCHLORIDE SCH MG: 20 TABLET, FILM COATED ORAL at 12:51

## 2022-09-23 RX ADMIN — METOPROLOL SUCCINATE SCH MG: 25 TABLET, EXTENDED RELEASE ORAL at 12:52

## 2022-09-23 RX ADMIN — METRONIDAZOLE SCH MLS/HR: 500 INJECTION, SOLUTION INTRAVENOUS at 19:09

## 2022-09-23 RX ADMIN — THERA TABS SCH TAB: TAB at 12:51

## 2022-09-23 RX ADMIN — IPRATROPIUM BROMIDE AND ALBUTEROL SULFATE SCH ML: .5; 3 SOLUTION RESPIRATORY (INHALATION) at 03:42

## 2022-09-23 RX ADMIN — METOPROLOL SUCCINATE SCH MG: 50 TABLET, EXTENDED RELEASE ORAL at 12:55

## 2022-09-23 RX ADMIN — ZOLPIDEM TARTRATE PRN MG: 5 TABLET ORAL at 21:54

## 2022-09-23 RX ADMIN — SIMVASTATIN SCH MG: 20 TABLET, FILM COATED ORAL at 21:40

## 2022-09-23 RX ADMIN — METOPROLOL SUCCINATE SCH MG: 50 TABLET, EXTENDED RELEASE ORAL at 21:40

## 2022-09-23 RX ADMIN — LEVOFLOXACIN SCH MLS/HR: 5 INJECTION, SOLUTION INTRAVENOUS at 12:56

## 2022-09-23 RX ADMIN — METRONIDAZOLE SCH MLS/HR: 500 INJECTION, SOLUTION INTRAVENOUS at 23:39

## 2022-09-23 RX ADMIN — IPRATROPIUM BROMIDE AND ALBUTEROL SULFATE SCH ML: .5; 3 SOLUTION RESPIRATORY (INHALATION) at 23:09

## 2022-09-23 RX ADMIN — AMLODIPINE BESYLATE SCH MG: 5 TABLET ORAL at 12:56

## 2022-09-23 RX ADMIN — INSULIN LISPRO PRN UNIT: 100 INJECTION, SOLUTION INTRAVENOUS; SUBCUTANEOUS at 12:55

## 2022-09-23 RX ADMIN — LOSARTAN POTASSIUM SCH MG: 50 TABLET, FILM COATED ORAL at 12:51

## 2022-09-23 RX ADMIN — WATER SCH MG: 1 INJECTION INTRAMUSCULAR; INTRAVENOUS; SUBCUTANEOUS at 21:42

## 2022-09-23 RX ADMIN — CYANOCOBALAMIN TAB 500 MCG SCH MCG: 500 TAB at 12:51

## 2022-09-23 RX ADMIN — INSULIN LISPRO PRN UNIT: 100 INJECTION, SOLUTION INTRAVENOUS; SUBCUTANEOUS at 21:54

## 2022-09-23 RX ADMIN — Medication SCH: at 13:09

## 2022-09-23 RX ADMIN — WATER SCH MG: 1 INJECTION INTRAMUSCULAR; INTRAVENOUS; SUBCUTANEOUS at 15:25

## 2022-09-23 RX ADMIN — INSULIN LISPRO PRN UNIT: 100 INJECTION, SOLUTION INTRAVENOUS; SUBCUTANEOUS at 19:09

## 2022-09-23 RX ADMIN — IPRATROPIUM BROMIDE AND ALBUTEROL SULFATE SCH: .5; 3 SOLUTION RESPIRATORY (INHALATION) at 10:50

## 2022-09-23 RX ADMIN — TAMSULOSIN HYDROCHLORIDE SCH MG: 0.4 CAPSULE ORAL at 21:40

## 2022-09-23 RX ADMIN — PANTOPRAZOLE SODIUM SCH MG: 40 TABLET, DELAYED RELEASE ORAL at 12:51

## 2022-09-23 RX ADMIN — ENOXAPARIN SODIUM SCH MG: 100 INJECTION SUBCUTANEOUS at 21:38

## 2022-09-23 RX ADMIN — IPRATROPIUM BROMIDE AND ALBUTEROL SULFATE SCH ML: .5; 3 SOLUTION RESPIRATORY (INHALATION) at 06:45

## 2022-09-23 RX ADMIN — METRONIDAZOLE SCH MLS/HR: 500 INJECTION, SOLUTION INTRAVENOUS at 14:05

## 2022-09-23 NOTE — PCM.NOTE
Date and Time: 09/23/22 0914





Subjective Assessment: 





patient arrived with abd pain and nausea/vomiting. had elevated pancreas enzymes

on arrival but have normalized. ct ?ileus vs enteritis, he has NG to LIS. passed

flatus overnight and states he is feeling better at this time.





Objective Exam


General Appearance: no apparent distress, alert


Neurologic Exam: alert, oriented x 3


Respiratory Exam: normal breath sounds, lungs clear, No respiratory distress


Cardiovascular Exam: regular rate/rhythm, normal heart sounds


Gastrointestinal/Abdomen Exam: soft, No tenderness, No mass


Extremity Exam: normal inspection, normal range of motion





OBJECTIVE DATA


Vital Signs: 


                               Vital Signs - 24 hr











  Temp Pulse Resp BP Pulse Ox


 


 09/23/22 08:29   65  20  170/77  94 L


 


 09/23/22 08:00  98.4 F  76  23  175/77  91 L


 


 09/23/22 06:47   65  20   94 L


 


 09/23/22 03:46   66  18   93 L


 


 09/23/22 03:45  97.8 F  74  18  170/77  93 L


 


 09/22/22 23:38  98.0 F  87  20  147/70  93 L


 


 09/22/22 22:25   78  20   94 L


 


 09/22/22 20:00  98.2 F  100 H  21  143/98  93 L


 


 09/22/22 19:42   61  16   93 L


 


 09/22/22 16:00  97.8 F  71  16  127/60  94 L


 


 09/22/22 14:35   76  20   92 L


 


 09/22/22 11:32   78  18   94 L


 


 09/22/22 11:25  96.6 F  78  18  169/67  93 L








                        Pain Assessment - Last Documented











Pain Intensity                 0


 


Pain Scale Used                0-10 Pain Scale











Intake and Output: 


                                 Intake & Output











 09/20/22 09/21/22 09/22/22 09/23/22





 11:59 11:59 11:59 11:59


 


Intake Total 469 3086 1936 3602


 


Output Total 6947 2803 9783 7803


 


Balance -746 -9255 -0438 -326


 


Weight 146.1 kg 141.9 kg 143 kg 143 kg











Lab Results: 


                            Lab Results-Last 24 Hours











  09/22/22 09/22/22 09/22/22 Range/Units





  08:42 11:13 16:47 


 


WBC     (4.0-10.5)  x10^3/uL


 


RBC     (4.1-5.6)  x10^6/uL


 


Hgb     (12.5-18.0)  g/dL


 


Hct     (42-50)  %


 


MCV     ()  fL


 


MCH     (26-32)  pg


 


MCHC     (32-36)  g/dL


 


RDW     (11.5-14.0)  %


 


Plt Count     (150-450)  x10^3/uL


 


MPV     (7.5-11.0)  fL


 


Sodium  143    (137-145)  mmol/L


 


Potassium  4.4    (3.5-5.1)  mmol/L


 


Chloride  105    ()  mmol/L


 


Carbon Dioxide  28    (22-30)  mmol/L


 


Anion Gap  14.7    (5-15)  MEQ/L


 


BUN  33 H    (9-20)  mg/dL


 


Creatinine  1.25    (0.66-1.25)  mg/dL


 


Estimated GFR  59.5    ML/MIN


 


Glucose  251 H    ()  mg/dL


 


POC Glucometer   233 H  191 H  (74 to 106)  mg/dL


 


Calcium  8.6    (8.4-10.2)  mg/dL


 


Total Bilirubin     (0.2-1.3)  mg/dL


 


AST     (17-59)  U/L


 


ALT     (0-50)  U/L


 


Alkaline Phosphatase     ()  U/L


 


Serum Total Protein     (6.3-8.2)  g/dL


 


Albumin     (3.5-5.0)  g/dL














  09/22/22 09/23/22 09/23/22 Range/Units





  20:50 07:52 08:35 


 


WBC    9.1  (4.0-10.5)  x10^3/uL


 


RBC    4.56  (4.1-5.6)  x10^6/uL


 


Hgb    12.6  (12.5-18.0)  g/dL


 


Hct    40.5 L  (42-50)  %


 


MCV    88.8  ()  fL


 


MCH    27.6  (26-32)  pg


 


MCHC    31.1 L  (32-36)  g/dL


 


RDW    14.2 H  (11.5-14.0)  %


 


Plt Count    300  (150-450)  x10^3/uL


 


MPV    11.5 H  (7.5-11.0)  fL


 


Sodium     (137-145)  mmol/L


 


Potassium     (3.5-5.1)  mmol/L


 


Chloride     ()  mmol/L


 


Carbon Dioxide     (22-30)  mmol/L


 


Anion Gap     (5-15)  MEQ/L


 


BUN     (9-20)  mg/dL


 


Creatinine     (0.66-1.25)  mg/dL


 


Estimated GFR     ML/MIN


 


Glucose     ()  mg/dL


 


POC Glucometer  232 H  223 H   (74 to 106)  mg/dL


 


Calcium     (8.4-10.2)  mg/dL


 


Total Bilirubin     (0.2-1.3)  mg/dL


 


AST     (17-59)  U/L


 


ALT     (0-50)  U/L


 


Alkaline Phosphatase     ()  U/L


 


Serum Total Protein     (6.3-8.2)  g/dL


 


Albumin     (3.5-5.0)  g/dL














  09/23/22 Range/Units





  08:35 


 


WBC   (4.0-10.5)  x10^3/uL


 


RBC   (4.1-5.6)  x10^6/uL


 


Hgb   (12.5-18.0)  g/dL


 


Hct   (42-50)  %


 


MCV   ()  fL


 


MCH   (26-32)  pg


 


MCHC   (32-36)  g/dL


 


RDW   (11.5-14.0)  %


 


Plt Count   (150-450)  x10^3/uL


 


MPV   (7.5-11.0)  fL


 


Sodium  142  (137-145)  mmol/L


 


Potassium  4.4  (3.5-5.1)  mmol/L


 


Chloride  106  ()  mmol/L


 


Carbon Dioxide  28  (22-30)  mmol/L


 


Anion Gap  12.1  (5-15)  MEQ/L


 


BUN  29 H  (9-20)  mg/dL


 


Creatinine  1.04  (0.66-1.25)  mg/dL


 


Estimated GFR  > 60.0  ML/MIN


 


Glucose  239 H  ()  mg/dL


 


POC Glucometer   (74 to 106)  mg/dL


 


Calcium  8.6  (8.4-10.2)  mg/dL


 


Total Bilirubin  0.80  (0.2-1.3)  mg/dL


 


AST  45  (17-59)  U/L


 


ALT  37  (0-50)  U/L


 


Alkaline Phosphatase  75  ()  U/L


 


Serum Total Protein  6.6  (6.3-8.2)  g/dL


 


Albumin  4.1  (3.5-5.0)  g/dL











Radiology Exams: 


                              Radiology Procedures











 Category Date Time Status


 


 CHEST 1 VIEW (PORTABLE) Stat Exams  09/21/22 09:06 Completed


 


 SMALL BOWEL FOLLOWING UGI Routine Exams  09/23/22 08:00 Ordered











Multi-Disciplinary Progress Notes: 


                        Multi-Disciplinary Progress Notes





09/22/22 09:48 Case Management Note by Haylie Graves


PATIENT STILL ACUTELY ILL- NG IN PLACE. 





Initialized on 09/22/22 09:48 - END OF NOTE

















Assessment/Plan


(1) Pancreatitis


Current Visit: Yes   Status: Acute   


Assessment & Plan: 


resolved, has been NPO. initial leukocytosis was likely related to mild acute 

pancreatitis which caused ileus as well.


Code(s): K85.90 - ACUTE PANCREATITIS WITHOUT NECROSIS OR INFECTION, UNSP   





(2) Paralytic ileus


Current Visit: Yes   Status: Acute   


Assessment & Plan: 


upper GI with small bowel follow-through pending this am. if no obstruction can 

likely discontinue NG since he is passing flatus and advance diet as tolerated, 

clinically appears improved


Code(s): K56.0 - PARALYTIC ILEUS

## 2022-09-23 NOTE — XRAY
Indication: Abdomen pain.  Ileus.



Comparison: None



Preliminary  abdomen demonstrates nonspecific nonobstructed bowel gas

pattern with NG tube tip in stomach.  Solid organs unremarkable.  Osseous

structures intact with osteopenia and moderate multilevel degenerative

spondylosis.



Approximately 750 cc of diluted Gastrografin injected through indwelling NG

tube.  Multiple overhead and digital spot images obtained.  Normal antegrade

movement of the Gastrografin through the small bowel loops to the level of the

ascending colon within 60 minutes.  Spot compression of the bowel loops are

negative for focal stricture, obstruction, filling defect, or inflammatory

changes.  Normal ileocecal junction.



Impression: Negative small bowel follow-through exam.  Approximately 0.4

minute fluoroscopy used.

## 2022-09-24 LAB
ALBUMIN SERPL-MCNC: 3.5 G/DL (ref 3.5–5)
ALP SERPL-CCNC: 62 U/L (ref 38–126)
ALT SERPL-CCNC: 39 U/L (ref 0–50)
AMYLASE SERPL-CCNC: 76 U/L (ref 30–110)
ANION GAP SERPL CALC-SCNC: 10.6 MEQ/L (ref 5–15)
AST SERPL QL: 46 U/L (ref 17–59)
BASOPHILS # BLD AUTO: 0.07 X10^3/UL (ref 0–0.4)
BILIRUB BLD-MCNC: 0.7 MG/DL (ref 0.2–1.3)
BUN SERPL-MCNC: 23 MG/DL (ref 9–20)
CALCIUM SPEC-MCNC: 8 MG/DL (ref 8.4–10.2)
CHLORIDE SERPL-SCNC: 105 MMOL/L (ref 98–107)
CO2 SERPL-SCNC: 25 MMOL/L (ref 22–30)
CREAT SERPL-MCNC: 1.04 MG/DL (ref 0.66–1.25)
EOSINOPHIL # BLD AUTO: 0 X10^3/UL (ref 0–0.5)
GFR SERPLBLD BASED ON 1.73 SQ M-ARVRAT: > 60 ML/MIN
GLUCOSE SERPL-MCNC: 246 MG/DL (ref 74–106)
HCT VFR BLD AUTO: 37 % (ref 42–50)
HGB BLD-MCNC: 11.7 G/DL (ref 12.5–18)
LIPASE SERPL-CCNC: 152 U/L (ref 23–300)
LYMPHOCYTES # SPEC AUTO: 0.74 X10^3/UL (ref 1–4.6)
MCH RBC QN AUTO: 27.9 PG (ref 26–32)
MCHC RBC AUTO-ENTMCNC: 31.6 G/DL (ref 32–36)
MONOCYTES # BLD AUTO: 1.1 X10^3/UL (ref 0–1.3)
PLATELET # BLD AUTO: 274 X10^3/UL (ref 150–450)
POTASSIUM SERPLBLD-SCNC: 3.6 MMOL/L (ref 3.5–5.1)
PROT SERPL-MCNC: 5.9 G/DL (ref 6.3–8.2)
RBC # BLD AUTO: 4.2 X10^6/UL (ref 4.1–5.6)
SODIUM SERPL-SCNC: 137 MMOL/L (ref 137–145)
WBC # BLD AUTO: 11.6 X10^3/UL (ref 4–10.5)

## 2022-09-24 RX ADMIN — METOPROLOL SUCCINATE SCH MG: 50 TABLET, EXTENDED RELEASE ORAL at 09:23

## 2022-09-24 RX ADMIN — METRONIDAZOLE SCH MLS/HR: 500 INJECTION, SOLUTION INTRAVENOUS at 12:59

## 2022-09-24 RX ADMIN — INSULIN LISPRO PRN UNIT: 100 INJECTION, SOLUTION INTRAVENOUS; SUBCUTANEOUS at 08:30

## 2022-09-24 RX ADMIN — PAROXETINE HYDROCHLORIDE SCH MG: 20 TABLET, FILM COATED ORAL at 09:22

## 2022-09-24 RX ADMIN — INSULIN LISPRO PRN UNIT: 100 INJECTION, SOLUTION INTRAVENOUS; SUBCUTANEOUS at 17:32

## 2022-09-24 RX ADMIN — Medication SCH MG: at 09:22

## 2022-09-24 RX ADMIN — FUROSEMIDE SCH MG: 10 INJECTION, SOLUTION INTRAMUSCULAR; INTRAVENOUS at 09:22

## 2022-09-24 RX ADMIN — CYANOCOBALAMIN TAB 500 MCG SCH MCG: 500 TAB at 09:22

## 2022-09-24 RX ADMIN — IPRATROPIUM BROMIDE AND ALBUTEROL SULFATE SCH ML: .5; 3 SOLUTION RESPIRATORY (INHALATION) at 10:57

## 2022-09-24 RX ADMIN — IPRATROPIUM BROMIDE AND ALBUTEROL SULFATE SCH ML: .5; 3 SOLUTION RESPIRATORY (INHALATION) at 03:37

## 2022-09-24 RX ADMIN — IPRATROPIUM BROMIDE AND ALBUTEROL SULFATE SCH ML: .5; 3 SOLUTION RESPIRATORY (INHALATION) at 18:46

## 2022-09-24 RX ADMIN — POTASSIUM CHLORIDE SCH MEQ: 10 TABLET, EXTENDED RELEASE ORAL at 09:23

## 2022-09-24 RX ADMIN — ENOXAPARIN SODIUM SCH MG: 100 INJECTION SUBCUTANEOUS at 21:17

## 2022-09-24 RX ADMIN — IPRATROPIUM BROMIDE AND ALBUTEROL SULFATE SCH ML: .5; 3 SOLUTION RESPIRATORY (INHALATION) at 15:00

## 2022-09-24 RX ADMIN — SIMVASTATIN SCH MG: 20 TABLET, FILM COATED ORAL at 21:17

## 2022-09-24 RX ADMIN — LEVOFLOXACIN SCH MLS/HR: 5 INJECTION, SOLUTION INTRAVENOUS at 09:26

## 2022-09-24 RX ADMIN — IPRATROPIUM BROMIDE AND ALBUTEROL SULFATE SCH ML: .5; 3 SOLUTION RESPIRATORY (INHALATION) at 07:01

## 2022-09-24 RX ADMIN — METRONIDAZOLE SCH MLS/HR: 500 INJECTION, SOLUTION INTRAVENOUS at 18:14

## 2022-09-24 RX ADMIN — METOPROLOL SUCCINATE SCH MG: 50 TABLET, EXTENDED RELEASE ORAL at 21:17

## 2022-09-24 RX ADMIN — AMLODIPINE BESYLATE SCH MG: 5 TABLET ORAL at 09:24

## 2022-09-24 RX ADMIN — METOPROLOL SUCCINATE SCH MG: 25 TABLET, EXTENDED RELEASE ORAL at 09:23

## 2022-09-24 RX ADMIN — INSULIN LISPRO PRN UNIT: 100 INJECTION, SOLUTION INTRAVENOUS; SUBCUTANEOUS at 21:38

## 2022-09-24 RX ADMIN — METOPROLOL SUCCINATE SCH MG: 25 TABLET, EXTENDED RELEASE ORAL at 21:17

## 2022-09-24 RX ADMIN — Medication SCH MG: at 21:17

## 2022-09-24 RX ADMIN — WATER SCH MG: 1 INJECTION INTRAMUSCULAR; INTRAVENOUS; SUBCUTANEOUS at 06:03

## 2022-09-24 RX ADMIN — THERA TABS SCH TAB: TAB at 09:22

## 2022-09-24 RX ADMIN — METRONIDAZOLE SCH MLS/HR: 500 INJECTION, SOLUTION INTRAVENOUS at 06:04

## 2022-09-24 RX ADMIN — INSULIN LISPRO PRN UNIT: 100 INJECTION, SOLUTION INTRAVENOUS; SUBCUTANEOUS at 12:10

## 2022-09-24 RX ADMIN — METRONIDAZOLE SCH MLS/HR: 500 INJECTION, SOLUTION INTRAVENOUS at 23:42

## 2022-09-24 RX ADMIN — LOSARTAN POTASSIUM SCH MG: 50 TABLET, FILM COATED ORAL at 09:23

## 2022-09-24 RX ADMIN — Medication SCH U: at 09:24

## 2022-09-24 RX ADMIN — IPRATROPIUM BROMIDE AND ALBUTEROL SULFATE SCH ML: .5; 3 SOLUTION RESPIRATORY (INHALATION) at 23:38

## 2022-09-24 RX ADMIN — FAMOTIDINE SCH MG: 20 TABLET, FILM COATED ORAL at 09:24

## 2022-09-24 RX ADMIN — TAMSULOSIN HYDROCHLORIDE SCH MG: 0.4 CAPSULE ORAL at 21:16

## 2022-09-24 RX ADMIN — Medication SCH MG: at 09:25

## 2022-09-24 RX ADMIN — PANTOPRAZOLE SODIUM SCH MG: 40 TABLET, DELAYED RELEASE ORAL at 09:22

## 2022-09-24 NOTE — PCM.NOTE
Date and Time: 09/24/22  0748





Subjective Assessment: 





NG tube is out, patient states he is feeling better. no vomiting overnight, 

denies abd pain. he is requiring oxygen, normally only uses with his cpap at 

night





Objective Exam


General Appearance: no apparent distress, obese


Respiratory Exam: crackles/rales


Cardiovascular Exam: regular rate/rhythm, normal heart sounds


Gastrointestinal/Abdomen Exam: soft, No tenderness, No mass


Extremity Exam: normal inspection, normal range of motion





OBJECTIVE DATA


Vital Signs: 


                               Vital Signs - 24 hr











  Temp Pulse Resp BP Pulse Ox


 


 09/24/22 07:04   73  18   92 L


 


 09/24/22 03:52  97.7 F  82  20  147/71  91 L


 


 09/24/22 03:37   77  20   91 L


 


 09/23/22 23:51  97.8 F  82  20  150/71  90 L


 


 09/23/22 23:09   83  20   92 L


 


 09/23/22 20:00  98.0 F  94 H  20  139/68  92 L


 


 09/23/22 18:36   90  20   94 L


 


 09/23/22 16:00  97.5 F  82  23  142/67  93 L


 


 09/23/22 14:49   78  22   96


 


 09/23/22 12:00  97.3 F  80  23  185/81  94 L


 


 09/23/22 08:29   65  20  170/77  94 L


 


 09/23/22 08:00  98.4 F  76  23  175/77  91 L








                        Pain Assessment - Last Documented











Pain Intensity                 0


 


Pain Scale Used                0-10 Pain Scale











Intake and Output: 


                                 Intake & Output











 09/21/22 09/22/22 09/23/22 09/24/22





 11:59 11:59 11:59 11:59


 


Intake Total 4003 5754 0647 300


 


Output Total 4743 9707 1615 600


 


Balance -2741 -1539 -898 -300


 


Weight 141.9 kg 143 kg 143 kg 143 kg











Lab Results: 


                            Lab Results-Last 24 Hours











  09/23/22 09/23/22 09/23/22 Range/Units





  07:52 08:35 08:35 


 


WBC   9.1   (4.0-10.5)  x10^3/uL


 


RBC   4.56   (4.1-5.6)  x10^6/uL


 


Hgb   12.6   (12.5-18.0)  g/dL


 


Hct   40.5 L   (42-50)  %


 


MCV   88.8   ()  fL


 


MCH   27.6   (26-32)  pg


 


MCHC   31.1 L   (32-36)  g/dL


 


RDW   14.2 H   (11.5-14.0)  %


 


Plt Count   300   (150-450)  x10^3/uL


 


MPV   11.5 H   (7.5-11.0)  fL


 


Gran %     (36.0-66.0)  %


 


Immature Gran % (Auto)     (0.00-0.4)  %


 


Nucleat RBC Rel Count     (0.00-0.1)  %


 


Eos # (Auto)     (0-0.5)  x10^3/uL


 


Immature Gran # (Auto)     (0.00-0.03)  x10^3u/L


 


Absolute Lymphs (auto)     (1.0-4.6)  x10^3/uL


 


Absolute Monos (auto)     (0.0-1.3)  x10^3/uL


 


Absolute Nucleated RBC     (0.00-0.01)  x10^3u/L


 


Lymphocytes %     (24.0-44.0)  %


 


Monocytes %     (0.0-12.0)  %


 


Eosinophils %     (0.00-5.0)  %


 


Basophils %     (0.0-0.4)  %


 


Absolute Granulocytes     (1.4-6.9)  x10^3/uL


 


Basophils #     (0-0.4)  x10^3/uL


 


Sodium    142  (137-145)  mmol/L


 


Potassium    4.4  (3.5-5.1)  mmol/L


 


Chloride    106  ()  mmol/L


 


Carbon Dioxide    28  (22-30)  mmol/L


 


Anion Gap    12.1  (5-15)  MEQ/L


 


BUN    29 H  (9-20)  mg/dL


 


Creatinine    1.04  (0.66-1.25)  mg/dL


 


Estimated GFR    > 60.0  ML/MIN


 


Glucose    239 H  ()  mg/dL


 


POC Glucometer  223 H    (74 to 106)  mg/dL


 


Calcium    8.6  (8.4-10.2)  mg/dL


 


Total Bilirubin    0.80  (0.2-1.3)  mg/dL


 


AST    45  (17-59)  U/L


 


ALT    37  (0-50)  U/L


 


Alkaline Phosphatase    75  ()  U/L


 


Serum Total Protein    6.6  (6.3-8.2)  g/dL


 


Albumin    4.1  (3.5-5.0)  g/dL


 


Amylase     ()  U/L


 


Lipase     ()  U/L














  09/23/22 09/23/22 09/23/22 Range/Units





  12:51 17:11 18:53 


 


WBC     (4.0-10.5)  x10^3/uL


 


RBC     (4.1-5.6)  x10^6/uL


 


Hgb     (12.5-18.0)  g/dL


 


Hct     (42-50)  %


 


MCV     ()  fL


 


MCH     (26-32)  pg


 


MCHC     (32-36)  g/dL


 


RDW     (11.5-14.0)  %


 


Plt Count     (150-450)  x10^3/uL


 


MPV     (7.5-11.0)  fL


 


Gran %     (36.0-66.0)  %


 


Immature Gran % (Auto)     (0.00-0.4)  %


 


Nucleat RBC Rel Count     (0.00-0.1)  %


 


Eos # (Auto)     (0-0.5)  x10^3/uL


 


Immature Gran # (Auto)     (0.00-0.03)  x10^3u/L


 


Absolute Lymphs (auto)     (1.0-4.6)  x10^3/uL


 


Absolute Monos (auto)     (0.0-1.3)  x10^3/uL


 


Absolute Nucleated RBC     (0.00-0.01)  x10^3u/L


 


Lymphocytes %     (24.0-44.0)  %


 


Monocytes %     (0.0-12.0)  %


 


Eosinophils %     (0.00-5.0)  %


 


Basophils %     (0.0-0.4)  %


 


Absolute Granulocytes     (1.4-6.9)  x10^3/uL


 


Basophils #     (0-0.4)  x10^3/uL


 


Sodium     (137-145)  mmol/L


 


Potassium     (3.5-5.1)  mmol/L


 


Chloride     ()  mmol/L


 


Carbon Dioxide     (22-30)  mmol/L


 


Anion Gap     (5-15)  MEQ/L


 


BUN     (9-20)  mg/dL


 


Creatinine     (0.66-1.25)  mg/dL


 


Estimated GFR     ML/MIN


 


Glucose     ()  mg/dL


 


POC Glucometer  215 H  TNP  291 H  (74 to 106)  mg/dL


 


Calcium     (8.4-10.2)  mg/dL


 


Total Bilirubin     (0.2-1.3)  mg/dL


 


AST     (17-59)  U/L


 


ALT     (0-50)  U/L


 


Alkaline Phosphatase     ()  U/L


 


Serum Total Protein     (6.3-8.2)  g/dL


 


Albumin     (3.5-5.0)  g/dL


 


Amylase     ()  U/L


 


Lipase     ()  U/L














  09/23/22 09/24/22 09/24/22 Range/Units





  21:34 05:45 05:45 


 


WBC   11.6 H   (4.0-10.5)  x10^3/uL


 


RBC   4.20   (4.1-5.6)  x10^6/uL


 


Hgb   11.7 L   (12.5-18.0)  g/dL


 


Hct   37.0 L   (42-50)  %


 


MCV   88.1   ()  fL


 


MCH   27.9   (26-32)  pg


 


MCHC   31.6 L   (32-36)  g/dL


 


RDW   14.3 H   (11.5-14.0)  %


 


Plt Count   274   (150-450)  x10^3/uL


 


MPV   11.8 H   (7.5-11.0)  fL


 


Gran %   79.5 H   (36.0-66.0)  %


 


Immature Gran % (Auto)   4.0 H   (0.00-0.4)  %


 


Nucleat RBC Rel Count   0.0   (0.00-0.1)  %


 


Eos # (Auto)   0   (0-0.5)  x10^3/uL


 


Immature Gran # (Auto)   0.47 H   (0.00-0.03)  x10^3u/L


 


Absolute Lymphs (auto)   0.74 L   (1.0-4.6)  x10^3/uL


 


Absolute Monos (auto)   1.10   (0.0-1.3)  x10^3/uL


 


Absolute Nucleated RBC   0.00   (0.00-0.01)  x10^3u/L


 


Lymphocytes %   6.4 L   (24.0-44.0)  %


 


Monocytes %   9.5   (0.0-12.0)  %


 


Eosinophils %   0.0   (0.00-5.0)  %


 


Basophils %   0.6   (0.0-0.4)  %


 


Absolute Granulocytes   9.26 H   (1.4-6.9)  x10^3/uL


 


Basophils #   0.07   (0-0.4)  x10^3/uL


 


Sodium    137  (137-145)  mmol/L


 


Potassium    3.6  (3.5-5.1)  mmol/L


 


Chloride    105  ()  mmol/L


 


Carbon Dioxide    25  (22-30)  mmol/L


 


Anion Gap    10.6  (5-15)  MEQ/L


 


BUN    23 H  (9-20)  mg/dL


 


Creatinine    1.04  (0.66-1.25)  mg/dL


 


Estimated GFR    > 60.0  ML/MIN


 


Glucose    246 H  ()  mg/dL


 


POC Glucometer  389 H    (74 to 106)  mg/dL


 


Calcium    8.0 L  (8.4-10.2)  mg/dL


 


Total Bilirubin    0.70  (0.2-1.3)  mg/dL


 


AST    46  (17-59)  U/L


 


ALT    39  (0-50)  U/L


 


Alkaline Phosphatase    62  ()  U/L


 


Serum Total Protein    5.9 L  (6.3-8.2)  g/dL


 


Albumin    3.5  (3.5-5.0)  g/dL


 


Amylase    76  ()  U/L


 


Lipase    152  ()  U/L











Radiology Exams: 


                              Radiology Procedures











 Category Date Time Status


 


 SMALL BOWEL SERIES Routine Exams  09/23/22 08:00 Completed











Multi-Disciplinary Progress Notes: 


                        Multi-Disciplinary Progress Notes





09/23/22 19:00 Respiratory Note by Danielle Romero


Patient refuses CPAP for the night at this time. Pt was advised to contact [facility] if

he changes his mind and would like to wear it.





Initialized on 09/23/22 19:00 - END OF NOTE








09/23/22 14:51 Respiratory Note by Breanne Otero


O2 sat 96% on 3lpm nc. O2 decreased to 2lpm nc





Initialized on 09/23/22 14:51 - END OF NOTE








09/23/22 09:53 Case Management Note by Haylie Graves


S/W PATIENT- HE CONTINUES TO DENY ANY NEW NEEDS AT TIME OF DC. HE PLANS TO 

RETURN HOME TO HIS PRIOR LEVEL OF FUNCTIONING AT TIME OF DC. 





Initialized on 09/23/22 09:53 - END OF NOTE

















Assessment/Plan


(1) Pancreatitis


Current Visit: Yes   Status: Acute   Code(s): K85.90 - ACUTE PANCREATITIS 

WITHOUT NECROSIS OR INFECTION, UNSP   





(2) Paralytic ileus


Current Visit: Yes   Status: Acute   


Assessment & Plan: 


resolved, advance diet. he has ordered breakfast.


Code(s): K56.0 - PARALYTIC ILEUS   





(3) CHF (congestive heart failure)


Current Visit: Yes   Status: Acute   


Assessment & Plan: 


has some volume overload, will give IV lasix and lock fluids this am.


Code(s): I50.9 - HEART FAILURE, UNSPECIFIED

## 2022-09-25 VITALS — DIASTOLIC BLOOD PRESSURE: 61 MMHG | OXYGEN SATURATION: 95 % | HEART RATE: 82 BPM | SYSTOLIC BLOOD PRESSURE: 114 MMHG

## 2022-09-25 LAB
ANION GAP SERPL CALC-SCNC: 8.2 MEQ/L (ref 5–15)
BUN SERPL-MCNC: 22 MG/DL (ref 9–20)
CALCIUM SPEC-MCNC: 7.8 MG/DL (ref 8.4–10.2)
CELLS COUNTED: 100
CHLORIDE SERPL-SCNC: 101 MMOL/L (ref 98–107)
CO2 SERPL-SCNC: 30 MMOL/L (ref 22–30)
CREAT SERPL-MCNC: 1.18 MG/DL (ref 0.66–1.25)
GFR SERPLBLD BASED ON 1.73 SQ M-ARVRAT: > 60 ML/MIN
GLUCOSE SERPL-MCNC: 221 MG/DL (ref 74–106)
HCT VFR BLD AUTO: 38.2 % (ref 42–50)
HGB BLD-MCNC: 11.9 G/DL (ref 12.5–18)
MAGNESIUM SERPL-MCNC: 2.1 MG/DL (ref 1.6–2.3)
MANUAL DIF COMMENT BLD-IMP: NORMAL
MCH RBC QN AUTO: 27.9 PG (ref 26–32)
MCHC RBC AUTO-ENTMCNC: 31.2 G/DL (ref 32–36)
NEUTS BAND # BLD MANUAL: 3 % (ref 0–2)
PLATELET # BLD AUTO: 261 X10^3/UL (ref 150–450)
POTASSIUM SERPLBLD-SCNC: 3.2 MMOL/L (ref 3.5–5.1)
RBC # BLD AUTO: 4.27 X10^6/UL (ref 4.1–5.6)
SODIUM SERPL-SCNC: 136 MMOL/L (ref 137–145)
WBC # BLD AUTO: 12.6 X10^3/UL (ref 4–10.5)

## 2022-09-25 RX ADMIN — AMLODIPINE BESYLATE SCH MG: 5 TABLET ORAL at 10:09

## 2022-09-25 RX ADMIN — LEVOFLOXACIN SCH MLS/HR: 5 INJECTION, SOLUTION INTRAVENOUS at 10:19

## 2022-09-25 RX ADMIN — IPRATROPIUM BROMIDE AND ALBUTEROL SULFATE SCH ML: .5; 3 SOLUTION RESPIRATORY (INHALATION) at 03:37

## 2022-09-25 RX ADMIN — INSULIN LISPRO PRN UNIT: 100 INJECTION, SOLUTION INTRAVENOUS; SUBCUTANEOUS at 08:39

## 2022-09-25 RX ADMIN — THERA TABS SCH TAB: TAB at 10:08

## 2022-09-25 RX ADMIN — Medication SCH U: at 10:10

## 2022-09-25 RX ADMIN — Medication SCH MG: at 10:10

## 2022-09-25 RX ADMIN — LOSARTAN POTASSIUM SCH MG: 50 TABLET, FILM COATED ORAL at 10:08

## 2022-09-25 RX ADMIN — IPRATROPIUM BROMIDE AND ALBUTEROL SULFATE SCH ML: .5; 3 SOLUTION RESPIRATORY (INHALATION) at 06:50

## 2022-09-25 RX ADMIN — PANTOPRAZOLE SODIUM SCH MG: 40 TABLET, DELAYED RELEASE ORAL at 10:09

## 2022-09-25 RX ADMIN — METOPROLOL SUCCINATE SCH MG: 25 TABLET, EXTENDED RELEASE ORAL at 10:09

## 2022-09-25 RX ADMIN — METRONIDAZOLE SCH MLS/HR: 500 INJECTION, SOLUTION INTRAVENOUS at 05:33

## 2022-09-25 RX ADMIN — IPRATROPIUM BROMIDE AND ALBUTEROL SULFATE SCH ML: .5; 3 SOLUTION RESPIRATORY (INHALATION) at 10:55

## 2022-09-25 RX ADMIN — PAROXETINE HYDROCHLORIDE SCH MG: 20 TABLET, FILM COATED ORAL at 10:09

## 2022-09-25 RX ADMIN — FUROSEMIDE SCH MG: 10 INJECTION, SOLUTION INTRAMUSCULAR; INTRAVENOUS at 10:08

## 2022-09-25 RX ADMIN — POTASSIUM CHLORIDE SCH MEQ: 10 TABLET, EXTENDED RELEASE ORAL at 10:08

## 2022-09-25 RX ADMIN — METOPROLOL SUCCINATE SCH MG: 50 TABLET, EXTENDED RELEASE ORAL at 10:08

## 2022-09-25 RX ADMIN — FAMOTIDINE SCH MG: 20 TABLET, FILM COATED ORAL at 10:08

## 2022-09-25 RX ADMIN — CYANOCOBALAMIN TAB 500 MCG SCH MCG: 500 TAB at 10:08

## 2022-09-25 RX ADMIN — Medication SCH MG: at 10:21

## 2022-09-25 NOTE — PCM.DS
Discharge Summary


Date of Admission: 


09/20/22 04:30





Admitting Physician: 


RAJANI CHAPMAN





Consults: 





                                Consults on Case





09/20/22 14:26


Consult Surgery ROUTINE 











Primary Care Provider: 


RAJANI CHAPMAN








Allergies


Allergies





Sulfa (Sulfonamide Antibiotics) Allergy (Severe, Verified 09/19/22 19:49)


   


   hallucinations 


codeine Allergy (Intermediate, Verified 09/19/22 19:49)


   Nausea and Vomiting


cephalexin [From Keflex] Allergy (Unknown, Verified 09/19/22 19:49)


   











Hospital Summary





- Hospital Course


Hospital Course: 





patient was admitted with nausea, vomiting and abd pain. found to have ileus on 

ct and mild pancreatitis. his pain is resolved, no more nausea or vomiting and 

he is tolerating a regular diet. feels well upon discharge.





- Vitals & Intake/Output


Vital Signs: 





                                   Vital Signs











Temperature  97.5 F   09/25/22 04:00


 


Pulse Rate  73   09/25/22 06:52


 


Respiratory Rate  18   09/25/22 06:52


 


Blood Pressure  163/72   09/25/22 04:00


 


O2 Sat by Pulse Oximetry  92 L  09/25/22 06:52











Intake & Output: 





                                 Intake & Output











 09/22/22 09/23/22 09/24/22 09/25/22





 11:59 11:59 11:59 11:59


 


Intake Total 1936 3602 900 4438


 


Output Total 3475 4500 600 


 


Balance -1539 -


 


Weight 143 kg 143 kg 141.2 kg 














- Lab


Result Diagrams: 


                                 09/25/22 05:59





                                 09/25/22 05:59


Lab Results-Last 24 Hrs: 





                            Lab Results-Last 24 Hours











  09/24/22 09/24/22 09/24/22 Range/Units





  11:40 16:16 21:24 


 


WBC     (4.0-10.5)  x10^3/uL


 


RBC     (4.1-5.6)  x10^6/uL


 


Hgb     (12.5-18.0)  g/dL


 


Hct     (42-50)  %


 


MCV     ()  fL


 


MCH     (26-32)  pg


 


MCHC     (32-36)  g/dL


 


RDW     (11.5-14.0)  %


 


Plt Count     (150-450)  x10^3/uL


 


MPV     (7.5-11.0)  fL


 


Sodium     (137-145)  mmol/L


 


Potassium     (3.5-5.1)  mmol/L


 


Chloride     ()  mmol/L


 


Carbon Dioxide     (22-30)  mmol/L


 


Anion Gap     (5-15)  MEQ/L


 


BUN     (9-20)  mg/dL


 


Creatinine     (0.66-1.25)  mg/dL


 


Estimated GFR     ML/MIN


 


Glucose     ()  mg/dL


 


POC Glucometer  272 H  257 H  245 H  (74 to 106)  mg/dL


 


Calcium     (8.4-10.2)  mg/dL


 


Magnesium     (1.6-2.3)  mg/dL














  09/25/22 09/25/22 Range/Units





  05:59 05:59 


 


WBC  12.6 H   (4.0-10.5)  x10^3/uL


 


RBC  4.27   (4.1-5.6)  x10^6/uL


 


Hgb  11.9 L   (12.5-18.0)  g/dL


 


Hct  38.2 L   (42-50)  %


 


MCV  89.5   ()  fL


 


MCH  27.9   (26-32)  pg


 


MCHC  31.2 L   (32-36)  g/dL


 


RDW  14.0   (11.5-14.0)  %


 


Plt Count  261   (150-450)  x10^3/uL


 


MPV  11.9 H   (7.5-11.0)  fL


 


Sodium   136 L  (137-145)  mmol/L


 


Potassium   3.2 L  (3.5-5.1)  mmol/L


 


Chloride   101  ()  mmol/L


 


Carbon Dioxide   30  (22-30)  mmol/L


 


Anion Gap   8.2  (5-15)  MEQ/L


 


BUN   22 H  (9-20)  mg/dL


 


Creatinine   1.18  (0.66-1.25)  mg/dL


 


Estimated GFR   > 60.0  ML/MIN


 


Glucose   221 H  ()  mg/dL


 


POC Glucometer    (74 to 106)  mg/dL


 


Calcium   7.8 L  (8.4-10.2)  mg/dL


 


Magnesium   2.1  (1.6-2.3)  mg/dL











Micro Results-Entire Visit: 





                                   Accuchecks











Date                           09/24/22


 


Date                           09/24/22


 


Date                           09/24/22


 


Time                           21:30


 


Time                           16:16


 


Time                           11:40

















- Radiology Exams


Ordered Rad Exams-Entire Visit: 





                              Radiology Procedures











 Category Date Time Status


 


 SMALL BOWEL SERIES Routine Exams  09/23/22 08:00 Completed














- Procedures and Test


Procedures and Tests throughout Hospitalization: 





                            Therapy Orders & Screens





09/19/22 23:52


Respiratory Therapy Assessment DAILY 


   Comment: 


   Diagnosis: Gastroenteritis





09/19/22 23:56


BiPap/CPAP ROUTINE 


   Comment: 


   Diagnosis: Gastroenteritis





09/20/22 00:29


BiPap/CPAP ROUTINE 


   Comment: 


   Diagnosis: Gastroenteritis





09/20/22 11:24


Oxygen Nasal Cannula 2 lpm 


   Comment: 


   Diagnosis: Gastroenteritis





09/22/22 14:47


Flutter Therapy UD 


   Comment: 


   Diagnosis: abdominal pain, nausea and vomiting for 1 day














Discharge Exam


General Appearance: no apparent distress


Neurologic Exam: alert, oriented x 3


Respiratory Exam: normal breath sounds, lungs clear, No respiratory distress


Cardiovascular Exam: regular rate/rhythm, normal heart sounds


Gastrointestinal/Abdomen Exam: soft, No tenderness, No mass


Extremity Exam: normal inspection, normal range of motion


Skin Exam: normal color, warm, dry





Final Diagnosis/Problem List





- Final Discharge Diagnosis/Problem


(1) Paralytic ileus


Current Visit: Yes   Status: Acute   Code(s): K56.0 - PARALYTIC ILEUS   





(2) Pancreatitis


Current Visit: Yes   Status: Acute   Code(s): K85.90 - ACUTE PANCREATITIS 

WITHOUT NECROSIS OR INFECTION, UNSP   





(3) CHF (congestive heart failure)


Current Visit: Yes   Status: Acute   Code(s): I50.9 - HEART FAILURE, UNSPECIFIED

  





- Discharge


Disposition: Home, Self-Care


Condition: Stable


Prescriptions: 


Continue


   Senna 8.6 mg*** [Senokot 8.6 mg***] 8.6 mg PO DAILY PRN PRN


     PRN Reason: Constipation


   Pyridoxine HCl (Vitamin B6) [Vitamin B-6] 100 mg PO DAILY


   Vitamin E 400 Units*** [Vitamin E 400 UNIT SOFTGEL***] 400 unit PO DAILY


   Losartan Potassium 50 mg PO DAILY


   Tamsulosin HCl 0.4 mg*** [Flomax 0.4 MG***] 0.8 mg PO HS


   Nabumetone [Relafen] 500 mg PO BID


   Metoprolol Succinate 75 mg PO BID


   Metformin HCl 500 mg*** [Glucophage 500 MG***] 500 mg PO HS


   Multivitamin [Multi-Vitamin Daily] 1 each PO DAILY


   Omeprazole 20 mg PO DAILY


   Apixaban [Eliquis] 5 mg PO BID


   Famotidine 20 mg*** [Pepcid 20 MG***] 20 mg PO DAILY


   Cyanocobalamin (Vitamin B-12) [Vitamin B12] 1,000 mcg PO DAILY


   Ubidecarenone [Co Q-10] 200 mg PO DAILY


   Rosuvastatin Calcium 10 mg PO HS


   PARoxetine HCL [Paroxetine HCl] 20 mg PO DAILY


   Omega-3/Dha/Epa/Fish Oil [Fish Oil 1,000 mg Softgel] 4,000 mg PO BID


   Amlodipine Besylate [Norvasc] 2.5 mg PO DAILY


   Albuterol Sulfate [Proair Hfa] 8.5 gm IH Q4-6HPRN PRN


     PRN Reason: Shortness Of Breath


Follow up with: 


ACOSTA UMANA [Family Provider] - 


RAJANI CHAPMAN MD [Primary Care Provider] - Call for Appointment

## 2024-09-24 ENCOUNTER — HOSPITAL ENCOUNTER (OUTPATIENT)
Dept: HOSPITAL 33 - ED | Age: 79
Setting detail: OBSERVATION
LOS: 1 days | Discharge: HOME | End: 2024-09-25
Attending: INTERNAL MEDICINE | Admitting: INTERNAL MEDICINE
Payer: MEDICARE

## 2024-09-24 DIAGNOSIS — I50.9: ICD-10-CM

## 2024-09-24 DIAGNOSIS — E11.65: ICD-10-CM

## 2024-09-24 DIAGNOSIS — Z79.899: ICD-10-CM

## 2024-09-24 DIAGNOSIS — E66.9: ICD-10-CM

## 2024-09-24 DIAGNOSIS — Z79.01: ICD-10-CM

## 2024-09-24 DIAGNOSIS — I11.0: ICD-10-CM

## 2024-09-24 DIAGNOSIS — Z86.718: ICD-10-CM

## 2024-09-24 DIAGNOSIS — D64.9: Primary | ICD-10-CM

## 2024-09-24 DIAGNOSIS — R06.02: ICD-10-CM

## 2024-09-24 LAB
ABO GROUP BLD: (no result)
ALBUMIN SERPL-MCNC: 3.9 G/DL (ref 3.5–5)
ALP SERPL-CCNC: 88 U/L (ref 38–126)
ALT SERPL-CCNC: 17 U/L (ref 0–50)
ANION GAP SERPL CALC-SCNC: 15.1 MEQ/L (ref 5–15)
APTT PPP: 39.5 SECONDS (ref 25.1–36.5)
AST SERPL QL: 26 U/L (ref 17–59)
BASE EXCESS BLDV CALC-SCNC: 2.6 MMOL/L (ref -2–2)
BASOPHILS # BLD AUTO: 0.06 X10^3/UL (ref 0.01–0.08)
BASOPHILS NFR BLD AUTO: 0.8 % (ref 0.2–1.2)
BILIRUB BLD-MCNC: 0.8 MG/DL (ref 0.2–1.3)
BLD SMEAR INTERP: YES
BUN SERPL-MCNC: 15 MG/DL (ref 9–20)
CALCIUM SPEC-MCNC: 9 MG/DL (ref 8.4–10.2)
CHLORIDE SERPL-SCNC: 104 MMOL/L (ref 98–107)
CO2 SERPL-SCNC: 27 MMOL/L (ref 22–30)
COHGB MFR BLDV: 3.7 % T HGB (ref 0–6.9)
CREAT SERPL-MCNC: 1.46 MG/DL (ref 0.66–1.25)
EOSINOPHIL # BLD AUTO: 0.4 X10^3/UL (ref 0.04–0.54)
GFR SERPLBLD BASED ON 1.73 SQ M-ARVRAT: 48.6 ML/MIN
GLUCOSE SERPL-MCNC: 115 MG/DL (ref 74–106)
HCO3 BLDV-SCNC: 27.2 MEQ/L (ref 22–28)
HCT VFR BLD AUTO: 25.7 % (ref 40.1–51)
HGB BLD-MCNC: 6.9 G/DL (ref 13.7–17.5)
HGB BLDV-MCNC: 7.6 G/DL
IMM GRANULOCYTES # BLD: 0.02 X10^3U/L (ref 0–0.03)
IMM GRANULOCYTES NFR BLD: 0.3 % (ref 0–0.43)
INHALED O2 CONCENTRATION: 21 %
INR PPP: 0.98 (ref 0.8–3)
LYMPHOCYTES # SPEC AUTO: 1.1 X10^3/UL (ref 1.32–3.57)
MAGNESIUM SERPL-MCNC: 2.3 MG/DL (ref 1.6–2.3)
MCH RBC QN AUTO: 19.4 PG (ref 25.7–32.2)
MCHC RBC AUTO-ENTMCNC: 26.8 G/DL (ref 32.3–36.5)
MONOCYTES # BLD AUTO: 1.1 X10^3/UL (ref 0.3–0.82)
NRBC # BLD AUTO: 0 X10^3U/L (ref 0–0.01)
NRBC BLD AUTO-RTO: 0 % (ref 0–0.2)
PCO2 BLDV: 41 MM/HG (ref 42–55)
PLATELET # BLD AUTO: 392 X10^3/UL (ref 163–337)
PO2 BLDV: 38 MM/HG (ref 25–40)
POTASSIUM BLDV-SCNC: 3.7 MMOL/L (ref 3.5–5.1)
POTASSIUM SERPLBLD-SCNC: 3.8 MMOL/L (ref 3.5–5.1)
PROT SERPL-MCNC: 6.9 G/DL (ref 6.3–8.2)
PROTHROMBIN TIME: 10.7 SECONDS (ref 9.4–12.5)
RBC # BLD AUTO: 3.55 X10^6/UL (ref 4.63–6.08)
RH BLD: POSITIVE
SAO2 % BLDV: 66.4 % (ref 95–100)
SODIUM SERPL-SCNC: 142 MMOL/L (ref 135–145)
TYPE + XM PNL BLD: (no result)
TYPE + XM PNL BLD: (no result)
WBC # BLD AUTO: 7.8 X10^3/UL (ref 4.23–9.07)

## 2024-09-24 PROCEDURE — 93041 RHYTHM ECG TRACING: CPT

## 2024-09-24 PROCEDURE — G0378 HOSPITAL OBSERVATION PER HR: HCPCS

## 2024-09-24 PROCEDURE — 71045 X-RAY EXAM CHEST 1 VIEW: CPT

## 2024-09-24 PROCEDURE — 94762 N-INVAS EAR/PLS OXIMTRY CONT: CPT

## 2024-09-24 PROCEDURE — 80048 BASIC METABOLIC PNL TOTAL CA: CPT

## 2024-09-24 PROCEDURE — 86900 BLOOD TYPING SEROLOGIC ABO: CPT

## 2024-09-24 PROCEDURE — 86901 BLOOD TYPING SEROLOGIC RH(D): CPT

## 2024-09-24 PROCEDURE — 85730 THROMBOPLASTIN TIME PARTIAL: CPT

## 2024-09-24 PROCEDURE — 94760 N-INVAS EAR/PLS OXIMETRY 1: CPT

## 2024-09-24 PROCEDURE — 80053 COMPREHEN METABOLIC PANEL: CPT

## 2024-09-24 PROCEDURE — 82805 BLOOD GASES W/O2 SATURATION: CPT

## 2024-09-24 PROCEDURE — 85027 COMPLETE CBC AUTOMATED: CPT

## 2024-09-24 PROCEDURE — 84484 ASSAY OF TROPONIN QUANT: CPT

## 2024-09-24 PROCEDURE — 83605 ASSAY OF LACTIC ACID: CPT

## 2024-09-24 PROCEDURE — 86850 RBC ANTIBODY SCREEN: CPT

## 2024-09-24 PROCEDURE — 36430 TRANSFUSION BLD/BLD COMPNT: CPT

## 2024-09-24 PROCEDURE — 85025 COMPLETE CBC W/AUTO DIFF WBC: CPT

## 2024-09-24 PROCEDURE — 99285 EMERGENCY DEPT VISIT HI MDM: CPT

## 2024-09-24 PROCEDURE — 82947 ASSAY GLUCOSE BLOOD QUANT: CPT

## 2024-09-24 PROCEDURE — 96365 THER/PROPH/DIAG IV INF INIT: CPT

## 2024-09-24 PROCEDURE — 83880 ASSAY OF NATRIURETIC PEPTIDE: CPT

## 2024-09-24 PROCEDURE — 83735 ASSAY OF MAGNESIUM: CPT

## 2024-09-24 PROCEDURE — 36000 PLACE NEEDLE IN VEIN: CPT

## 2024-09-24 PROCEDURE — 93005 ELECTROCARDIOGRAM TRACING: CPT

## 2024-09-24 PROCEDURE — 86922 COMPATIBILITY TEST ANTIGLOB: CPT

## 2024-09-24 PROCEDURE — 85610 PROTHROMBIN TIME: CPT

## 2024-09-24 PROCEDURE — 83036 HEMOGLOBIN GLYCOSYLATED A1C: CPT

## 2024-09-24 PROCEDURE — 36415 COLL VENOUS BLD VENIPUNCTURE: CPT

## 2024-09-24 PROCEDURE — 93268 ECG RECORD/REVIEW: CPT

## 2024-09-24 PROCEDURE — 84134 ASSAY OF PREALBUMIN: CPT

## 2024-09-24 RX ADMIN — Medication SCH: at 23:43

## 2024-09-24 RX ADMIN — FUROSEMIDE ONE MG: 10 INJECTION, SOLUTION INTRAMUSCULAR; INTRAVENOUS at 23:48

## 2024-09-24 RX ADMIN — METFORMIN HYDROCHLORIDE SCH: 500 TABLET ORAL at 23:44

## 2024-09-24 RX ADMIN — TAMSULOSIN HYDROCHLORIDE SCH: 0.4 CAPSULE ORAL at 23:43

## 2024-09-24 RX ADMIN — METOPROLOL SUCCINATE SCH: 50 TABLET, EXTENDED RELEASE ORAL at 23:45

## 2024-09-24 RX ADMIN — SODIUM CHLORIDE SCH MLS/HR: 9 INJECTION, SOLUTION INTRAVENOUS at 18:55

## 2024-09-24 NOTE — ERPHSYRPT
- History of Present Illness


Time Seen by Provider: 09/24/24 16:50


Source: patient


Exam Limitations: no limitations


Patient Subjective Stated Complaint: C/O cough for 4 days so went to see primary

care (Dr. Chapman) today. Patient states Dr. Chapman told him he had fluid a

round his heart and to go to the ER.


Triage Nursing Assessment: Patient brought back to ER in a W/C. He is alert and 

oriented. He is SOB with minimal exertion. Wheezes present. He is wearing a 

mask. Skin tone normal. Scabs noted to BUE. Edema present to BLE. Moist, 

non-productive cough noted during assessment.


Physician History: 


Patient is a 79-year-old male referred to our ED from his primary care doctor 

for evaluation of possible CHF.  Patient followed up with his primary care 

doctor for evaluation of a cough and SOB that he has had for 4 days.  Cough is 

dry nonproductive and associated with mild shortness of breath.  Upon evaluation

by his primary care doctor patient was sent to our ED for evaluation of possible

CHF.  No chest pain no nausea vomiting or diaphoresis no fever.  Symptoms are 

mild to moderate in intensity.  Symptoms are worse with exertion.  Symptoms 

improved with rest.  Daughter at bedside.  They voiced no other complaints or 

concerns at this time.











Portions of this note were created with voice recognition technology.  There may

be grammatical, spelling, punctuation or sound alike errors








Timing/Duration: day(s)


Severity: moderate (4 days)


Modifying Factors: Improves With: nothing


Associated Symptoms: shortness of breath


Allergies/Adverse Reactions: 








Sulfa (Sulfonamide Antibiotics) Allergy (Severe, Verified 09/24/24 16:46)


   


   hallucinations 


codeine Allergy (Intermediate, Verified 09/24/24 16:46)


   Nausea and Vomiting


cephalexin [From Keflex] Allergy (Unknown, Verified 09/24/24 16:46)


   





Home Medications: 








Losartan Potassium 50 mg PO DAILY 01/17/16 [History]


Metformin HCl 500 mg*** [Glucophage 500 MG***] 500 mg PO HS 01/17/16 [History]


Metoprolol Succinate 75 mg PO BID 01/17/16 [History]


Multivitamin [Multi-Vitamin Daily] 1 each PO DAILY 01/17/16 [History]


Nabumetone [Relafen] 500 mg PO BID 01/17/16 [History]


Pyridoxine HCl (Vitamin B6) [Vitamin B-6] 100 mg PO DAILY 01/17/16 [History]


Senna 8.6 mg*** [Senokot 8.6 mg***] 8.6 mg PO DAILY PRN PRN 01/17/16 [History]


Tamsulosin HCl 0.4 mg*** [Flomax 0.4 MG***] 0.8 mg PO HS 01/17/16 [History]


Vitamin E 400 Units*** [Vitamin E 400 UNIT SOFTGEL***] 400 unit PO DAILY 

01/17/16 [History]


Omeprazole 20 mg PO DAILY 02/29/16 [History]


Famotidine 20 mg*** [Pepcid 20 MG***] 20 mg PO DAILY 07/03/20 [History]


Cyanocobalamin (Vitamin B-12) [Vitamin B12] 1,000 mcg PO DAILY 02/02/21 

[History]


PARoxetine HCL [Paroxetine HCl] 20 mg PO DAILY 02/02/21 [History]


Rosuvastatin Calcium 10 mg PO HS 02/02/21 [History]


Ubidecarenone [Co Q-10] 200 mg PO DAILY 02/02/21 [History]


Albuterol Sulfate [Proair Hfa] 8.5 gm IH Q4-6HPRN PRN 09/19/22 [History]


Amlodipine Besylate [Norvasc] 2.5 mg PO DAILY 09/19/22 [History]


Omega-3/Dha/Epa/Fish Oil [Fish Oil 1,000 mg Softgel] 4,000 mg PO BID 09/19/22 

[History]


Aspirin EC 81 mg*** [Ecotrin 81 mg***] 81 mg PO DAILY 09/24/24 [History]


Clopidogrel Bisulfate [Plavix] 75 mg PO DAILY 09/24/24 [History]





Hx Tetanus, Diphtheria Vaccination/Date Given: Yes


Hx Influenza Vaccination/Date Given: Yes


Hx Pneumococcal Vaccination/Date Given: Yes


Immunizations Up to Date: Yes





Travel Risk





- International Travel


Have you traveled outside of the country in past 3 weeks: No





- Emerging Infectious Disease


Are you exhibiting symptoms associated with any current EIDs: Yes


Symptoms: Cough: New Onset, Shortness of Breath





- Review of Systems


Constitutional: No Symptoms, No Fever, No Chills


Eyes: No Symptoms


Ears, Nose, & Throat: No Symptoms


Respiratory: No Symptoms, No Cough, No Dyspnea


Cardiac: No Symptoms, No Chest Pain, No Edema, No Syncope


Abdominal/Gastrointestinal: No Symptoms, No Abdominal Pain, No Nausea, No 

Vomiting, No Diarrhea


Genitourinary Symptoms: No Symptoms, No Dysuria


Musculoskeletal: No Symptoms, No Back Pain, No Neck Pain


Skin: No Symptoms, No Rash


Neurological: No Symptoms, No Dizziness, No Focal Weakness, No Sensory Changes


Psychological: No Symptoms


Endocrine: No Symptoms


Hematologic/Lymphatic: No Symptoms


Immunological/Allergic: No Symptoms


All Other Systems: Reviewed and Negative





- Past Medical History


Pertinent Past Medical History: Yes


Neurological History: Peripheral Neuropathy


ENT History: No Pertinent History


Cardiac History: Arrhythmia, Deep Vein Thrombosis, Hypertension


Respiratory History: COPD, Pulmonary Embolism, Sleep Apnea


Endocrine Medical History: Diabetes Type II


Musculoskeletal History: Osteoarthritis


GI Medical History: Gallbladder Disease


 History: No Pertinent History


Psycho-Social History: Depression


Male Reproductive Disorders: Prostate Problems


Other Medical History: SURGERY ON L QUAD AFTER A TEAR, CODED AT REGIONAL DUE TO 

A BLOOD CLOT. RECENT BRAIN SCAN BY DR. SAENZ, WAS PASSING OUT DUE TO SYCOPE. 

ABLASION OF HEART TO TX AFIB 1 MONTH AGO. PACEMAKER, DEFIBRILLATOR.





- Past Surgical History


Past Surgical History: Yes


Neuro Surgical History: No Pertinent History


Cardiac: Cardiac Catheterization, Internal Defibrillator, Pacemaker


Respiratory: No Pertinent History


Gastrointestinal: Cholecystectomy


Genitourinary: No Pertinent History


Musculoskeletal: Orthopedic Surgery


Male Surgical History: Other


Other Surgical History: dental surgical extractions,left knee, tear of tendons 

repaired, watchman procedure in May 2024, Cardiologist: Dr. Perez/Becky


Significant Family History: diabetes, hypertension





- Social History


Smoking Status: Former smoker


How long have you smoked: 42 years


Exposure to second hand smoke: No


Alcohol Use: None


Drug Use: none


Patient Lives Alone: No





- Social Determinants of Health


Will the patient participate in the screening: Declined to provide





- Nursing Vital Signs


Nursing Vital Signs: 


                               Initial Vital Signs











Temperature  98.7 F   09/24/24 16:40


 


Pulse Rate  79   09/24/24 16:40


 


Respiratory Rate  27 H  09/24/24 16:40


 


Blood Pressure  139/66   09/24/24 16:40


 


O2 Sat by Pulse Oximetry  95   09/24/24 16:40








                                   Pain Scale











Pain Intensity                 0

















- Physical Exam


General Appearance: no apparent distress, alert


Eye Exam: PERRL/EOMI, eyes nml inspection


Ears, Nose, Throat Exam: normal ENT inspection, pharynx normal, moist mucous 

membranes


Neck Exam: normal inspection, non-tender, supple, full range of motion


Respiratory Exam: normal breath sounds, lungs clear, airway intact, No 

respiratory distress


Cardiovascular Exam: regular rate/rhythm, normal heart sounds, normal peripheral

 pulses


Gastrointestinal/Abdomen Exam: soft, normal bowel sounds, No tenderness, No mass


Back Exam: normal inspection, normal range of motion, No CVA tenderness, No 

vertebral tenderness


Extremity Exam: normal inspection, normal range of motion, pelvis stable


Neurologic Exam: alert, oriented x 3, cooperative, normal mood/affect, sensation

 nml, No motor deficits


Skin Exam: normal color, warm, dry, No rash


Lymphatic Exam: No adenopathy


**SpO2 Interpretation**: normal


SpO2: 96


O2 Delivery: Room Air





- Course


Nursing assessment & vital signs reviewed: Yes


EKG Interpreted by Me: RATE ([id number]), Sinus Rhythm, NORMAL AXIS, NORMAL INTERVALS, R

ight Bundle Branch Block





- Radiology Exams


  ** Chest


X-ray Interpretation: Interpreted by me (No acute findings)


Ordered Tests: 


                               Active Orders 24 hr











 Category Date Time Status


 


 Cardiac Monitor STAT Care  09/24/24 17:01 Active


 


 EKG-ER Only STAT Care  09/24/24 17:00 Active


 


 IV Insertion STAT Care  09/24/24 17:00 Active


 


 Pulse Oximetry (ED) STAT Care  09/24/24 17:00 Active


 


 CHEST 1 VIEW (PORTABLE) Stat Exams  09/24/24 17:01 Taken


 


 CBC W DIFF Stat Lab  09/24/24 17:20 Completed


 


 CMP Stat Lab  09/24/24 17:20 Completed


 


 Lactic Acid Stat Lab  09/24/24 17:23 Completed


 


 MAGNESIUM Stat Lab  09/24/24 17:20 Completed


 


 NT PRO BNPII Stat Lab  09/24/24 17:20 Completed


 


 PROTIME WITH INR Stat Lab  09/24/24 17:20 Completed


 


 PTT Stat Lab  09/24/24 17:20 Completed


 


 TROPONIN Q4H Lab  09/24/24 17:20 Completed


 


 TROPONIN Q4H Lab  09/24/24 21:15 Ordered


 


 TROPONIN Q4H Lab  09/25/24 01:15 Ordered


 


 VENOUS BLOOD GAS Stat Lab  09/24/24 17:23 Completed


 


 Transfer Order Routine Transfer  09/24/24 Ordered











Lab/Rad Data: 


                           Laboratory Result Diagrams





                                 09/24/24 17:20 





                                 09/24/24 17:20 





                               Laboratory Results











  09/24/24 09/24/24 09/24/24 Range/Units





  17:23 17:23 17:20 


 


WBC     (4.23-9.07)  x10^3/uL


 


RBC     (4.63-6.08)  x10^6/uL


 


Hgb     (13.7-17.5)  g/dL


 


Hct     (40.1-51.0)  %


 


MCV     (79.0-92.2)  fL


 


MCH     (25.7-32.2)  pg


 


MCHC     (32.3-36.5)  g/dL


 


RDW     (11.6-14.4)  %


 


Plt Count     (163-337)  x10^3/uL


 


MPV     (9.4-12.4)  fL


 


Gran %     (34.0-67.9)  %


 


Immature Gran % (Auto)     (0.001-0.429)  %


 


Nucleat RBC Rel Count     (0.00-0.2)  %


 


Eos # (Auto)     (0.04-0.54)  x10^3/uL


 


Immature Gran # (Auto)     (0.001-0.031)  x10^3u/L


 


Absolute Lymphs (auto)     (1.32-3.57)  x10^3/uL


 


Absolute Monos (auto)     (0.30-0.82)  x10^3/uL


 


Absolute Nucleated RBC     (0.00-0.012)  x10^3u/L


 


Lymphocytes %     (21.8-53.1)  %


 


Monocytes %     (5.3-12.2)  %


 


Eosinophils %     (0.8-7.0)  %


 


Basophils %     (0.2-1.2)  %


 


Absolute Granulocytes     (1.78-5.38)  x10^3/uL


 


Basophils #     (0.01-0.08)  x10^3/uL


 


PT     (9.4-12.5)  SECONDS


 


INR     (0.8-3.0)  


 


APTT     (25.1-36.5)  SECONDS


 


pO2/FiO2 Ratio  21.0    %


 


VBG pH  7.43 H    (7.32-7.42)  


 


VBG pCO2 at Pat Temp  41 L    (42-55)  mm/Hg


 


VBG pO2 at Pat Temp  38    (25-40)  mm/Hg


 


VBG HCO3  27.2    (22-28)  meq/L


 


VBG O2 Sat ([place])  66.4 L    ()  


 


VBG Base Excess  2.6 H    (-2.0-2.0)  


 


VBG Hemoglobin  7.6 L*    


 


VBG Carboxyhemoglobin  3.7    (0.0-6.9)  % T HGB


 


POC Potassium  3.7    (3.5-5.1)  


 


Sodium     (135-145)  mmol/L


 


Potassium     (3.5-5.1)  mmol/L


 


Chloride     ()  mmol/L


 


Carbon Dioxide     (22-30)  mmol/L


 


Anion Gap     (5-15)  MEQ/L


 


BUN     (9-20)  mg/dL


 


Creatinine     (0.66-1.25)  mg/dL


 


Estimated GFR     ML/MIN


 


Glucose     ()  mg/dL


 


Lactic Acid   1.2   (0.4-2.0)  


 


Calcium     (8.4-10.2)  mg/dL


 


Magnesium     (1.6-2.3)  mg/dL


 


Total Bilirubin     (0.2-1.3)  mg/dL


 


AST     (17-59)  U/L


 


ALT     (0-50)  U/L


 


Alkaline Phosphatase     ()  U/L


 


Troponin I     (0.000-0.033)  ng/mL


 


NT-Pro-B Natriuret Pep    449  (<300)  pg/mL


 


Serum Total Protein     (6.3-8.2)  g/dL


 


Albumin     (3.5-5.0)  g/dL


 


Slides for Path Review     














  09/24/24 09/24/24 09/24/24 Range/Units





  17:20 17:20 17:20 


 


WBC     (4.23-9.07)  x10^3/uL


 


RBC     (4.63-6.08)  x10^6/uL


 


Hgb     (13.7-17.5)  g/dL


 


Hct     (40.1-51.0)  %


 


MCV     (79.0-92.2)  fL


 


MCH     (25.7-32.2)  pg


 


MCHC     (32.3-36.5)  g/dL


 


RDW     (11.6-14.4)  %


 


Plt Count     (163-337)  x10^3/uL


 


MPV     (9.4-12.4)  fL


 


Gran %     (34.0-67.9)  %


 


Immature Gran % (Auto)     (0.001-0.429)  %


 


Nucleat RBC Rel Count     (0.00-0.2)  %


 


Eos # (Auto)     (0.04-0.54)  x10^3/uL


 


Immature Gran # (Auto)     (0.001-0.031)  x10^3u/L


 


Absolute Lymphs (auto)     (1.32-3.57)  x10^3/uL


 


Absolute Monos (auto)     (0.30-0.82)  x10^3/uL


 


Absolute Nucleated RBC     (0.00-0.012)  x10^3u/L


 


Lymphocytes %     (21.8-53.1)  %


 


Monocytes %     (5.3-12.2)  %


 


Eosinophils %     (0.8-7.0)  %


 


Basophils %     (0.2-1.2)  %


 


Absolute Granulocytes     (1.78-5.38)  x10^3/uL


 


Basophils #     (0.01-0.08)  x10^3/uL


 


PT   10.7   (9.4-12.5)  SECONDS


 


INR   0.98   (0.8-3.0)  


 


APTT   39.5 H   (25.1-36.5)  SECONDS


 


pO2/FiO2 Ratio     %


 


VBG pH     (7.32-7.42)  


 


VBG pCO2 at Pat Temp     (42-55)  mm/Hg


 


VBG pO2 at Pat Temp     (25-40)  mm/Hg


 


VBG HCO3     (22-28)  meq/L


 


VBG O2 Sat ([place])     ()  


 


VBG Base Excess     (-2.0-2.0)  


 


VBG Hemoglobin     


 


VBG Carboxyhemoglobin     (0.0-6.9)  % T HGB


 


POC Potassium     (3.5-5.1)  


 


Sodium    142  (135-145)  mmol/L


 


Potassium    3.8  (3.5-5.1)  mmol/L


 


Chloride    104  ()  mmol/L


 


Carbon Dioxide    27  (22-30)  mmol/L


 


Anion Gap    15.1 H  (5-15)  MEQ/L


 


BUN    15  (9-20)  mg/dL


 


Creatinine    1.46 H  (0.66-1.25)  mg/dL


 


Estimated GFR    48.6  ML/MIN


 


Glucose    115 H  ()  mg/dL


 


Lactic Acid     (0.4-2.0)  


 


Calcium    9.0  (8.4-10.2)  mg/dL


 


Magnesium    2.3  (1.6-2.3)  mg/dL


 


Total Bilirubin    0.80  (0.2-1.3)  mg/dL


 


AST    26  (17-59)  U/L


 


ALT    17  (0-50)  U/L


 


Alkaline Phosphatase    88  ()  U/L


 


Troponin I  < 0.012    (0.000-0.033)  ng/mL


 


NT-Pro-B Natriuret Pep     (<300)  pg/mL


 


Serum Total Protein    6.9  (6.3-8.2)  g/dL


 


Albumin    3.9  (3.5-5.0)  g/dL


 


Slides for Path Review     














  09/24/24 Range/Units





  17:20 


 


WBC  7.8  (4.23-9.07)  x10^3/uL


 


RBC  3.55 L  (4.63-6.08)  x10^6/uL


 


Hgb  6.9 L*  (13.7-17.5)  g/dL


 


Hct  25.7 L  (40.1-51.0)  %


 


MCV  72.4 L  (79.0-92.2)  fL


 


MCH  19.4 L  (25.7-32.2)  pg


 


MCHC  26.8 L  (32.3-36.5)  g/dL


 


RDW  18.2 H  (11.6-14.4)  %


 


Plt Count  392 H  (163-337)  x10^3/uL


 


MPV  10.6  (9.4-12.4)  fL


 


Gran %  65.6  (34.0-67.9)  %


 


Immature Gran % (Auto)  0.3  (0.001-0.429)  %


 


Nucleat RBC Rel Count  0.0  (0.00-0.2)  %


 


Eos # (Auto)  0.40  (0.04-0.54)  x10^3/uL


 


Immature Gran # (Auto)  0.02  (0.001-0.031)  x10^3u/L


 


Absolute Lymphs (auto)  1.10 L  (1.32-3.57)  x10^3/uL


 


Absolute Monos (auto)  1.10 H  (0.30-0.82)  x10^3/uL


 


Absolute Nucleated RBC  0.00  (0.00-0.012)  x10^3u/L


 


Lymphocytes %  14.1 L  (21.8-53.1)  %


 


Monocytes %  14.1 H  (5.3-12.2)  %


 


Eosinophils %  5.1  (0.8-7.0)  %


 


Basophils %  0.8  (0.2-1.2)  %


 


Absolute Granulocytes  5.10  (1.78-5.38)  x10^3/uL


 


Basophils #  0.06  (0.01-0.08)  x10^3/uL


 


PT   (9.4-12.5)  SECONDS


 


INR   (0.8-3.0)  


 


APTT   (25.1-36.5)  SECONDS


 


pO2/FiO2 Ratio   %


 


VBG pH   (7.32-7.42)  


 


VBG pCO2 at Pat Temp   (42-55)  mm/Hg


 


VBG pO2 at Pat Temp   (25-40)  mm/Hg


 


VBG HCO3   (22-28)  meq/L


 


VBG O2 Sat ([place])   ()  


 


VBG Base Excess   (-2.0-2.0)  


 


VBG Hemoglobin   


 


VBG Carboxyhemoglobin   (0.0-6.9)  % T HGB


 


POC Potassium   (3.5-5.1)  


 


Sodium   (135-145)  mmol/L


 


Potassium   (3.5-5.1)  mmol/L


 


Chloride   ()  mmol/L


 


Carbon Dioxide   (22-30)  mmol/L


 


Anion Gap   (5-15)  MEQ/L


 


BUN   (9-20)  mg/dL


 


Creatinine   (0.66-1.25)  mg/dL


 


Estimated GFR   ML/MIN


 


Glucose   ()  mg/dL


 


Lactic Acid   (0.4-2.0)  


 


Calcium   (8.4-10.2)  mg/dL


 


Magnesium   (1.6-2.3)  mg/dL


 


Total Bilirubin   (0.2-1.3)  mg/dL


 


AST   (17-59)  U/L


 


ALT   (0-50)  U/L


 


Alkaline Phosphatase   ()  U/L


 


Troponin I   (0.000-0.033)  ng/mL


 


NT-Pro-B Natriuret Pep   (<300)  pg/mL


 


Serum Total Protein   (6.3-8.2)  g/dL


 


Albumin   (3.5-5.0)  g/dL


 


Slides for Path Review  YES  














- Progress


Progress: improved


Progress Note: 


Patient accepted by  at 6:36 PM.  79-year-old male presents to our ED 

for shortness of breath and a cough.  Workup reveals a significant microcytic 

anemia.  Chest x-ray no acute findings.  .  Hemoglobin 6.9.  Patient will

 require transfusion.  Plan of care discussed with patient.  He agrees to 

admission at [facility] for further evaluation and 

treatment including blood transfusion.








Portions of this note were created with voice recognition technology.  There may

 be grammatical, spelling, punctuation or sound alike errors








Complexity of problem addressed is moderate acute complicated.  No critical care

 time.  Complex of data reviewed and analyzed is sensitive.  Test ordered test 

reviewed results analyzed and correlated clinically with history and physical 

exam.  Discussed with hospitalist accepts admission to observation.  Risk of 

complication or risk of morbidity/mortality of patient management is high.  

Patient requires hospitalization for further evaluation and treatment..  Vital 

stable.  Time spent to discharge patient approximately 20 minutes.  Plan of care

 established for shared decision making.  No social determinants of health 

present to impede follow-up.








Portions of this note were created with voice recognition technology.  There may

 be grammatical, spelling, punctuation or sound alike errors








09/24/24 18:42





09/24/24 18:43





Counseled pt/family regarding: lab results, diagnosis, need for follow-up, rad 

results





- Departure


Departure Disposition: Observation


Clinical Impression: 


 Symptomatic anemia, Shortness of breath





Condition: Stable


Critical Care Time: No


Referrals: 


RAJANI CHAPMAN MD [Primary Care Provider] - Follow up/PCP as directed

## 2024-09-24 NOTE — PCM.HP
History of Present Illness





- Chief Complaint


Chief Complaint: Symptomatic anemia, shortness of breath


Date: 09/24/24


History of Present Illness: 


 is a 79 year old male with a history of chronic iron deficiency 

anemia presented as a referral from his primary care office for transfusion due 

to anemia, prompted by reported dyspnea. He denies chest pain and his dyspnea 

improves with rest and worsens with exertion. He has had extensive endoscopic 

workup for anemia which has been unremarkable, with the last scope about 5 years

ago at . He has not received a transfusion in over 10 years. He denies 

hematemesis, long term weight loss (he has lost 30 pounds over the past 2 months

after starting on a diabetic medication), dysphagia, odynophagia, melena, 

hematochezia, or change in stool caliber. He has reported a dry cough but no 

fevers or chills. In the ED, the hemoglobin was low so a request for PRBC 

transfusion was initiated. The patient's daughter is at bedside dring my 

assessment. 





- Review of Systems


Constitutional: Fatigue, Weight Loss


Eyes: No Symptoms


Ears, Nose, & Throat: No Symptoms


Respiratory: Cough, Short Of Breath


Cardiac: No Symptoms


Abdominal/Gastrointestinal: No Symptoms


Genitourinary Symptoms: No Symptoms


Musculoskeletal: No Symptoms


Skin: No Symptoms


Neurological: No Symptoms


Psychological: No Symptoms


Endocrine: No Symptoms


Hematologic/Lymphatic: No Symptoms


Immunological/Allergic: No Symptoms


All Other Systems: Reviewed and Negative





Medications & Allergies


Home Medications: 


                              Home Medication List





Losartan Potassium 50 mg PO DAILY 01/17/16 [History Confirmed 09/19/22]


Metformin HCl 500 mg*** [Glucophage 500 MG***] 500 mg PO HS 01/17/16 [History 

Confirmed 09/19/22]


Metoprolol Succinate 75 mg PO BID 01/17/16 [History Confirmed 09/19/22]


Multivitamin [Multi-Vitamin Daily] 1 each PO DAILY 01/17/16 [History Confirmed 

09/19/22]


Nabumetone [Relafen] 500 mg PO BID 01/17/16 [History Confirmed 09/19/22]


Pyridoxine HCl (Vitamin B6) [Vitamin B-6] 100 mg PO DAILY 01/17/16 [History 

Confirmed 09/19/22]


Senna 8.6 mg*** [Senokot 8.6 mg***] 8.6 mg PO DAILY PRN PRN 01/17/16 [History 

Confirmed 09/19/22]


Tamsulosin HCl 0.4 mg*** [Flomax 0.4 MG***] 0.8 mg PO HS 01/17/16 [History 

Confirmed 09/19/22]


Vitamin E 400 Units*** [Vitamin E 400 UNIT SOFTGEL***] 400 unit PO DAILY 

01/17/16 [History Confirmed 09/19/22]


Omeprazole 20 mg PO DAILY 02/29/16 [History Confirmed 09/19/22]


Famotidine 20 mg*** [Pepcid 20 MG***] 20 mg PO DAILY 07/03/20 [History Confirmed

 09/19/22]


Cyanocobalamin (Vitamin B-12) [Vitamin B12] 1,000 mcg PO DAILY 02/02/21 [History

 Confirmed 09/19/22]


PARoxetine HCL [Paroxetine HCl] 20 mg PO DAILY 02/02/21 [History Confirmed 

09/19/22]


Rosuvastatin Calcium 10 mg PO HS 02/02/21 [History Confirmed 09/19/22]


Ubidecarenone [Co Q-10] 200 mg PO DAILY 02/02/21 [History Confirmed 09/19/22]


Albuterol Sulfate [Proair Hfa] 8.5 gm IH Q4-6HPRN PRN 09/19/22 [History 

Confirmed 09/19/22]


Amlodipine Besylate [Norvasc] 2.5 mg PO DAILY 09/19/22 [History Confirmed 

09/19/22]


Omega-3/Dha/Epa/Fish Oil [Fish Oil 1,000 mg Softgel] 4,000 mg PO BID 09/19/22 

[History Confirmed 09/19/22]


Aspirin EC 81 mg*** [Ecotrin 81 mg***] 81 mg PO DAILY 09/24/24 [History 

Confirmed 09/24/24]


Clopidogrel Bisulfate [Plavix] 75 mg PO DAILY 09/24/24 [History Confirmed 

09/24/24]








Allergies/Adverse Reactions: 


                                    Allergies











Allergy/AdvReac Type Severity Reaction Status Date / Time


 


cephalexin [From Keflex] Allergy Severe kidneys Verified 09/24/24 20:14





   shut down  


 


codeine Allergy Mild Nausea and Verified 09/24/24 20:14





   Vomiting  


 


Sulfa (Sulfonamide Allergy Mild hallucinati Verified 09/24/24 20:14





Antibiotics)   on  














- Past Medical History


Past Medical History: Yes


Neurological History: Peripheral Neuropathy


ENT History: No Pertinent History


Cardiac History: Arrhythmia, Deep Vein Thrombosis, Hypertension


Respiratory History: COPD, Pulmonary Embolism, Sleep Apnea


Endocrine Medical History: Diabetes Type II


Musculoskelatal History: Osteoarthritis


GI Medical History: Gallbladder Disease


 History: No Pertinent History


Pyscho-Social History: Depression


Male Reproductive Disorders: Prostate Problems


Comment: SURGERY ON L QUAD AFTER A TEAR, CODED AT REGIONAL DUE TO A BLOOD CLOT. 

RECENT BRAIN SCAN BY DR. SAENZ, WAS PASSING OUT DUE TO SYCOPE. ABLASION OF 

HEART TO TX AFIB 1 MONTH AGO. PACEMAKER, DEFIBRILLATOR.





- Past Surgical History


Past Surgical History: Yes


Neuro Surgical History: No Pertinent History


Cardiac History: Cardiac Catheterization, Internal Defibrillator, Pacemaker


Respiratory Surgery: No Pertinent History


GI Surgical History: Cholecystectomy


Genitourinary Surgical Hx: No Pertinent History


Musculskeletal Surgical Hx: Orthopedic Surgery


Male Surgical History: Other


Other Surgical History: dental surgical extractions,left knee, tear of tendons 

repaired, watchman procedure in May 2024, Cardiologist: Dr. Perez/Becky


Significant Family History: diabetes, hypertension





- Social History


Smoking Status: Former smoker


How long have you smoked: 42 years


Exposure to second hand smoke: No


Alcohol: None


Drug Use: none





- Social Determinants of Health


Will the patient participate in the screening: Declined to provide





- Physical Exam


Vital Signs: 


                               Vital Signs - 24 hr











  Temp Pulse Resp BP BP Pulse Ox


 


 09/24/24 19:00   75  21  143/75  


 


 09/24/24 18:47       96


 


 09/24/24 18:31   75  21  150/75  


 


 09/24/24 18:01     157/71  


 


 09/24/24 17:30     140/58  


 


 09/24/24 17:02       96


 


 09/24/24 17:00   71  20  118/64   94 L


 


 09/24/24 16:45   70  26 H  139/66   92 L


 


 09/24/24 16:40  98.7 F  79  27 H   139/66  95











General Appearance: no apparent distress, alert


Neurologic Exam: alert, oriented x 3, cooperative, CNs II-XII nml as tested, 

normal mood/affect, nml cerebellar function


Eye Exam: PERRL/EOMI, eyes nml inspection


Ears, Nose, Throat Exam: normal ENT inspection


Neck Exam: normal inspection, non-tender, supple, full range of motion


Respiratory Exam: normal breath sounds, lungs clear


Cardiovascular Exam: regular rate/rhythm, normal heart sounds


Gastrointestinal/Abdomen Exam: soft, normal bowel sounds


Back Exam: normal range of motion


Extremity Exam: normal inspection, normal range of motion


Skin Exam: normal color





Results





- Labs


Lab/Micro Results: 


                            Lab Results-Last 24 Hours











  09/24/24 09/24/24 09/24/24 Range/Units





  17:20 17:20 17:20 


 


WBC  7.8    (4.23-9.07)  x10^3/uL


 


RBC  3.55 L    (4.63-6.08)  x10^6/uL


 


Hgb  6.9 L*    (13.7-17.5)  g/dL


 


Hct  25.7 L    (40.1-51.0)  %


 


MCV  72.4 L    (79.0-92.2)  fL


 


MCH  19.4 L    (25.7-32.2)  pg


 


MCHC  26.8 L    (32.3-36.5)  g/dL


 


RDW  18.2 H    (11.6-14.4)  %


 


Plt Count  392 H    (163-337)  x10^3/uL


 


MPV  10.6    (9.4-12.4)  fL


 


Gran %  65.6    (34.0-67.9)  %


 


Immature Gran % (Auto)  0.3    (0.001-0.429)  %


 


Nucleat RBC Rel Count  0.0    (0.00-0.2)  %


 


Eos # (Auto)  0.40    (0.04-0.54)  x10^3/uL


 


Immature Gran # (Auto)  0.02    (0.001-0.031)  x10^3u/L


 


Absolute Lymphs (auto)  1.10 L    (1.32-3.57)  x10^3/uL


 


Absolute Monos (auto)  1.10 H    (0.30-0.82)  x10^3/uL


 


Absolute Nucleated RBC  0.00    (0.00-0.012)  x10^3u/L


 


Lymphocytes %  14.1 L    (21.8-53.1)  %


 


Monocytes %  14.1 H    (5.3-12.2)  %


 


Eosinophils %  5.1    (0.8-7.0)  %


 


Basophils %  0.8    (0.2-1.2)  %


 


Absolute Granulocytes  5.10    (1.78-5.38)  x10^3/uL


 


Basophils #  0.06    (0.01-0.08)  x10^3/uL


 


PT    10.7  (9.4-12.5)  SECONDS


 


INR    0.98  (0.8-3.0)  


 


APTT    39.5 H  (25.1-36.5)  SECONDS


 


pO2/FiO2 Ratio     %


 


VBG pH     (7.32-7.42)  


 


VBG pCO2 at Pat Temp     (42-55)  mm/Hg


 


VBG pO2 at Pat Temp     (25-40)  mm/Hg


 


VBG HCO3     (22-28)  meq/L


 


VBG O2 Sat (Pe[place])     ()  


 


VBG Base Excess     (-2.0-2.0)  


 


VBG Hemoglobin     


 


VBG Carboxyhemoglobin     (0.0-6.9)  % T HGB


 


POC Potassium     (3.5-5.1)  


 


Sodium   142   (135-145)  mmol/L


 


Potassium   3.8   (3.5-5.1)  mmol/L


 


Chloride   104   ()  mmol/L


 


Carbon Dioxide   27   (22-30)  mmol/L


 


Anion Gap   15.1 H   (5-15)  MEQ/L


 


BUN   15   (9-20)  mg/dL


 


Creatinine   1.46 H   (0.66-1.25)  mg/dL


 


Estimated GFR   48.6   ML/MIN


 


Glucose   115 H   ()  mg/dL


 


Lactic Acid     (0.4-2.0)  


 


Calcium   9.0   (8.4-10.2)  mg/dL


 


Magnesium   2.3   (1.6-2.3)  mg/dL


 


Total Bilirubin   0.80   (0.2-1.3)  mg/dL


 


AST   26   (17-59)  U/L


 


ALT   17   (0-50)  U/L


 


Alkaline Phosphatase   88   ()  U/L


 


Troponin I     (0.000-0.033)  ng/mL


 


NT-Pro-B Natriuret Pep     (<300)  pg/mL


 


Serum Total Protein   6.9   (6.3-8.2)  g/dL


 


Albumin   3.9   (3.5-5.0)  g/dL


 


Slides for Path Review  YES    


 


ABO Group     


 


Rh Factor     


 


Antibody Screen     (NEGATIVE)  


 


Crossmatch     (COMPATIBLE)  














  09/24/24 09/24/24 09/24/24 Range/Units





  17:20 17:20 17:23 


 


WBC     (4.23-9.07)  x10^3/uL


 


RBC     (4.63-6.08)  x10^6/uL


 


Hgb     (13.7-17.5)  g/dL


 


Hct     (40.1-51.0)  %


 


MCV     (79.0-92.2)  fL


 


MCH     (25.7-32.2)  pg


 


MCHC     (32.3-36.5)  g/dL


 


RDW     (11.6-14.4)  %


 


Plt Count     (163-337)  x10^3/uL


 


MPV     (9.4-12.4)  fL


 


Gran %     (34.0-67.9)  %


 


Immature Gran % (Auto)     (0.001-0.429)  %


 


Nucleat RBC Rel Count     (0.00-0.2)  %


 


Eos # (Auto)     (0.04-0.54)  x10^3/uL


 


Immature Gran # (Auto)     (0.001-0.031)  x10^3u/L


 


Absolute Lymphs (auto)     (1.32-3.57)  x10^3/uL


 


Absolute Monos (auto)     (0.30-0.82)  x10^3/uL


 


Absolute Nucleated RBC     (0.00-0.012)  x10^3u/L


 


Lymphocytes %     (21.8-53.1)  %


 


Monocytes %     (5.3-12.2)  %


 


Eosinophils %     (0.8-7.0)  %


 


Basophils %     (0.2-1.2)  %


 


Absolute Granulocytes     (1.78-5.38)  x10^3/uL


 


Basophils #     (0.01-0.08)  x10^3/uL


 


PT     (9.4-12.5)  SECONDS


 


INR     (0.8-3.0)  


 


APTT     (25.1-36.5)  SECONDS


 


pO2/FiO2 Ratio     %


 


VBG pH     (7.32-7.42)  


 


VBG pCO2 at Pat Temp     (42-55)  mm/Hg


 


VBG pO2 at Pat Temp     (25-40)  mm/Hg


 


VBG HCO3     (22-28)  meq/L


 


VBG O2 Sat (Pe[place])     ()  


 


VBG Base Excess     (-2.0-2.0)  


 


VBG Hemoglobin     


 


VBG Carboxyhemoglobin     (0.0-6.9)  % T HGB


 


POC Potassium     (3.5-5.1)  


 


Sodium     (135-145)  mmol/L


 


Potassium     (3.5-5.1)  mmol/L


 


Chloride     ()  mmol/L


 


Carbon Dioxide     (22-30)  mmol/L


 


Anion Gap     (5-15)  MEQ/L


 


BUN     (9-20)  mg/dL


 


Creatinine     (0.66-1.25)  mg/dL


 


Estimated GFR     ML/MIN


 


Glucose     ()  mg/dL


 


Lactic Acid    1.2  (0.4-2.0)  


 


Calcium     (8.4-10.2)  mg/dL


 


Magnesium     (1.6-2.3)  mg/dL


 


Total Bilirubin     (0.2-1.3)  mg/dL


 


AST     (17-59)  U/L


 


ALT     (0-50)  U/L


 


Alkaline Phosphatase     ()  U/L


 


Troponin I  < 0.012    (0.000-0.033)  ng/mL


 


NT-Pro-B Natriuret Pep   449   (<300)  pg/mL


 


Serum Total Protein     (6.3-8.2)  g/dL


 


Albumin     (3.5-5.0)  g/dL


 


Slides for Path Review     


 


ABO Group     


 


Rh Factor     


 


Antibody Screen     (NEGATIVE)  


 


Crossmatch     (COMPATIBLE)  














  09/24/24 09/24/24 09/24/24 Range/Units





  17:23 18:39 18:39 


 


WBC     (4.23-9.07)  x10^3/uL


 


RBC     (4.63-6.08)  x10^6/uL


 


Hgb     (13.7-17.5)  g/dL


 


Hct     (40.1-51.0)  %


 


MCV     (79.0-92.2)  fL


 


MCH     (25.7-32.2)  pg


 


MCHC     (32.3-36.5)  g/dL


 


RDW     (11.6-14.4)  %


 


Plt Count     (163-337)  x10^3/uL


 


MPV     (9.4-12.4)  fL


 


Gran %     (34.0-67.9)  %


 


Immature Gran % (Auto)     (0.001-0.429)  %


 


Nucleat RBC Rel Count     (0.00-0.2)  %


 


Eos # (Auto)     (0.04-0.54)  x10^3/uL


 


Immature Gran # (Auto)     (0.001-0.031)  x10^3u/L


 


Absolute Lymphs (auto)     (1.32-3.57)  x10^3/uL


 


Absolute Monos (auto)     (0.30-0.82)  x10^3/uL


 


Absolute Nucleated RBC     (0.00-0.012)  x10^3u/L


 


Lymphocytes %     (21.8-53.1)  %


 


Monocytes %     (5.3-12.2)  %


 


Eosinophils %     (0.8-7.0)  %


 


Basophils %     (0.2-1.2)  %


 


Absolute Granulocytes     (1.78-5.38)  x10^3/uL


 


Basophils #     (0.01-0.08)  x10^3/uL


 


PT     (9.4-12.5)  SECONDS


 


INR     (0.8-3.0)  


 


APTT     (25.1-36.5)  SECONDS


 


pO2/FiO2 Ratio  21.0    %


 


VBG pH  7.43 H    (7.32-7.42)  


 


VBG pCO2 at Pat Temp  41 L    (42-55)  mm/Hg


 


VBG pO2 at Pat Temp  38    (25-40)  mm/Hg


 


VBG HCO3  27.2    (22-28)  meq/L


 


VBG O2 Sat (Peña)  66.4 L    ()  


 


VBG Base Excess  2.6 H    (-2.0-2.0)  


 


VBG Hemoglobin  7.6 L*    


 


VBG Carboxyhemoglobin  3.7    (0.0-6.9)  % T HGB


 


POC Potassium  3.7    (3.5-5.1)  


 


Sodium     (135-145)  mmol/L


 


Potassium     (3.5-5.1)  mmol/L


 


Chloride     ()  mmol/L


 


Carbon Dioxide     (22-30)  mmol/L


 


Anion Gap     (5-15)  MEQ/L


 


BUN     (9-20)  mg/dL


 


Creatinine     (0.66-1.25)  mg/dL


 


Estimated GFR     ML/MIN


 


Glucose     ()  mg/dL


 


Lactic Acid     (0.4-2.0)  


 


Calcium     (8.4-10.2)  mg/dL


 


Magnesium     (1.6-2.3)  mg/dL


 


Total Bilirubin     (0.2-1.3)  mg/dL


 


AST     (17-59)  U/L


 


ALT     (0-50)  U/L


 


Alkaline Phosphatase     ()  U/L


 


Troponin I     (0.000-0.033)  ng/mL


 


NT-Pro-B Natriuret Pep     (<300)  pg/mL


 


Serum Total Protein     (6.3-8.2)  g/dL


 


Albumin     (3.5-5.0)  g/dL


 


Slides for Path Review     


 


ABO Group   A   


 


Rh Factor   POSITIVE   


 


Antibody Screen   NEGATIVE   (NEGATIVE)  


 


Crossmatch   COMPATIBLE  COMPATIBLE  (COMPATIBLE)  














- Radiology Impressions


Radiology Exams & Impressions: 


                              Radiology Procedures











 Category Date Time Status


 


 CHEST 1 VIEW (PORTABLE) Stat Exams  09/24/24 17:01 Taken














Assessment/Plan


(1) Symptomatic anemia


Current Visit: Yes   Status: Acute   


Assessment & Plan: 


Will transfuse 2 units PRBC. Will defer to PCP regarding the indication for 

repeat endoscopy referral, though this may need to be considered, given that the

patient is now requiring transfusion for the first time in a decade. Monitor c

ounts for response. Without overt GI bleeding, will continue Plavix and ASA for 

now. On PPI.


Code(s): D64.9 - ANEMIA, UNSPECIFIED   





(2) Shortness of breath


Current Visit: Yes   Status: Acute   


Assessment & Plan: 


Likely due to anemia. Will monitor on telemetry and also administer Lasix 

between the 2 units. 


Code(s): R06.02 - SHORTNESS OF BREATH   





(3) CHF (congestive heart failure)


Current Visit: No   Status: Acute   


Assessment & Plan: 


Will monitor on tele. Lasix as above. Continue cardiac regimen and monitor 

clinical course. 


Code(s): I50.9 - HEART FAILURE, UNSPECIFIED   





(4) Hyperglycemia due to type 2 diabetes mellitus


Current Visit: Yes   Status: Acute   


Assessment & Plan: 


Continue regimen and monitor sugars on ISS.


Code(s): E11.65 - TYPE 2 DIABETES MELLITUS WITH HYPERGLYCEMIA   





Telemedicine Encounter





- Telemedicine Encounter


Telemedicine Encounter: 


****"The entirety of this encounter was performed via Telemedicine"****





This visit was performed using real-time audio and video connection between my 

location and thepatients locationwith the assistance of a surrogateat the 

patients location. Written or verbal consent was obtained from the 

patient/guardian to perform this [facility] 

technology. Any patient questions regarding the telemedicine interaction were 

answered.

## 2024-09-25 VITALS
RESPIRATION RATE: 16 BRPM | DIASTOLIC BLOOD PRESSURE: 62 MMHG | TEMPERATURE: 98 F | HEART RATE: 71 BPM | SYSTOLIC BLOOD PRESSURE: 124 MMHG | OXYGEN SATURATION: 94 %

## 2024-09-25 LAB
ANION GAP SERPL CALC-SCNC: 12 MEQ/L (ref 5–15)
BLD SMEAR INTERP: YES
BUN SERPL-MCNC: 15 MG/DL (ref 9–20)
CALCIUM SPEC-MCNC: 9.3 MG/DL (ref 8.4–10.2)
CHLORIDE SERPL-SCNC: 101 MMOL/L (ref 98–107)
CO2 SERPL-SCNC: 29 MMOL/L (ref 22–30)
CREAT SERPL-MCNC: 1.54 MG/DL (ref 0.66–1.25)
GFR SERPLBLD BASED ON 1.73 SQ M-ARVRAT: 45.6 ML/MIN
GLUCOSE SERPL-MCNC: 155 MG/DL (ref 74–106)
HCT VFR BLD AUTO: 29.4 % (ref 40.1–51)
HGB BLD-MCNC: 8.4 G/DL (ref 13.7–17.5)
MCH RBC QN AUTO: 21.1 PG (ref 25.7–32.2)
MCHC RBC AUTO-ENTMCNC: 28.6 G/DL (ref 32.3–36.5)
PLATELET # BLD AUTO: 402 X10^3/UL (ref 163–337)
POTASSIUM SERPLBLD-SCNC: 3.7 MMOL/L (ref 3.5–5.1)
PREALB SERPL-MCNC: 21.59 MG/DL (ref 17.6–36)
RBC # BLD AUTO: 3.98 X10^6/UL (ref 4.63–6.08)
SODIUM SERPL-SCNC: 139 MMOL/L (ref 135–145)
WBC # BLD AUTO: 7.8 X10^3/UL (ref 4.23–9.07)

## 2024-09-25 RX ADMIN — TAMSULOSIN HYDROCHLORIDE SCH MG: 0.4 CAPSULE ORAL at 00:06

## 2024-09-25 RX ADMIN — LOSARTAN POTASSIUM SCH MG: 50 TABLET, FILM COATED ORAL at 10:32

## 2024-09-25 RX ADMIN — Medication SCH MG: at 00:08

## 2024-09-25 RX ADMIN — AMLODIPINE BESYLATE SCH MG: 5 TABLET ORAL at 00:06

## 2024-09-25 RX ADMIN — SIMVASTATIN SCH MG: 20 TABLET, FILM COATED ORAL at 00:06

## 2024-09-25 RX ADMIN — FERROUS SULFATE TAB 325 MG (65 MG ELEMENTAL FE) SCH MG: 325 (65 FE) TAB at 10:32

## 2024-09-25 RX ADMIN — PAROXETINE HYDROCHLORIDE SCH MG: 20 TABLET, FILM COATED ORAL at 00:08

## 2024-09-25 RX ADMIN — PANTOPRAZOLE SODIUM SCH MG: 40 TABLET, DELAYED RELEASE ORAL at 10:32

## 2024-09-25 RX ADMIN — METOPROLOL TARTRATE SCH MG: 25 TABLET, FILM COATED ORAL at 00:06

## 2024-09-25 RX ADMIN — FAMOTIDINE SCH MG: 20 TABLET, FILM COATED ORAL at 00:07

## 2024-09-25 RX ADMIN — THERA TABS SCH TAB: TAB at 10:32

## 2024-09-25 RX ADMIN — ASPIRIN SCH MG: 81 TABLET, COATED ORAL at 10:32

## 2024-09-25 RX ADMIN — CLOPIDOGREL BISULFATE SCH MG: 75 TABLET ORAL at 10:32

## 2024-09-25 NOTE — XRAY
Indication: Cough.  Short of breath.



Comparison: July 2, 2024



Portable chest remains inflated and clear.  Heart not enlarged again with left

pacemaker.  Bony thorax intact again with osteopenia and degenerative changes.



Impression: Continued nonacute chest with chronic features.

## 2024-09-25 NOTE — PCM.DS
Discharge Summary


Date of Admission: 


09/24/24 19:43





Date of Discharge: 





9/25/24


Admitting Physician: 


VIRGILIO LAMAS MD





Primary Care Provider: 


ADELA,RAJANI








Allergies


Allergies





cephalexin [From Keflex] Allergy (Severe, Verified 09/24/24 20:14)


   kidneys shut down


codeine Allergy (Mild, Verified 09/24/24 20:14)


   Nausea and Vomiting


Sulfa (Sulfonamide Antibiotics) Allergy (Mild, Verified 09/24/24 20:14)


   hallucination











Hospital Summary





- Hospital Course


Hospital Course: 


 is a 79 year old male with a history of chronic iron deficiency 

anemia. He presented on 9/24/24 as a referral from his primary care office for 

transfusion due to anemia, prompted by reported dyspnea. He denies chest pain 

and his dyspnea improves with rest and worsens with exertion. He has had 

extensive endoscopic workup for anemia which has been unremarkable, with the 

last scope about 5 years ago at . He has not received a transfusion in over 10

years. He denies hematemesis, long term weight loss (he has lost 30 pounds over 

the past 2 months after starting on a diabetic medication), dysphagia, 

odynophagia, melena, hematochezia, or change in stool caliber. He has reported a

dry cough but no fevers or chills. In the ED, the hemoglobin was low at 6.9 so a

request for PRBC transfusion was initiated. After 2 units PRBC last night Hgb 

8.4 today. He states he feels much better and no longer SOB. He is wanting to 

d/c today. Restarted pt on ferrous sulfate daily. He states he was taken off 

multiple vitamin as he was taking too many. Will have pt f/u with PCP OP. He 

denies any further concerns at this time. 








- Vitals & Intake/Output


Vital Signs: 





                                   Vital Signs











Temperature  98.0 F   09/25/24 07:15


 


Pulse Rate  71   09/25/24 07:15


 


Respiratory Rate  16   09/25/24 07:15


 


Blood Pressure  124/62   09/25/24 07:15


 


O2 Sat by Pulse Oximetry  94 L  09/25/24 07:15











Intake & Output: 





                                 Intake & Output











 09/22/24 09/23/24 09/24/24 09/25/24





 11:59 11:59 11:59 11:59


 


Intake Total    1654


 


Output Total    2015


 


Balance    -361


 


Weight    134 kg














- Lab


Result Diagrams: 


                                 09/25/24 05:00





                                 09/25/24 05:00


Lab Results-Last 24 Hrs: 





                            Lab Results-Last 24 Hours











  09/24/24 09/24/24 09/24/24 Range/Units





  17:20 17:20 17:20 


 


WBC  7.8    (4.23-9.07)  x10^3/uL


 


RBC  3.55 L    (4.63-6.08)  x10^6/uL


 


Hgb  6.9 L*    (13.7-17.5)  g/dL


 


Hct  25.7 L    (40.1-51.0)  %


 


MCV  72.4 L    (79.0-92.2)  fL


 


MCH  19.4 L    (25.7-32.2)  pg


 


MCHC  26.8 L    (32.3-36.5)  g/dL


 


RDW  18.2 H    (11.6-14.4)  %


 


Plt Count  392 H    (163-337)  x10^3/uL


 


MPV  10.6    (9.4-12.4)  fL


 


Gran %  65.6    (34.0-67.9)  %


 


Immature Gran % (Auto)  0.3    (0.001-0.429)  %


 


Nucleat RBC Rel Count  0.0    (0.00-0.2)  %


 


Eos # (Auto)  0.40    (0.04-0.54)  x10^3/uL


 


Immature Gran # (Auto)  0.02    (0.001-0.031)  x10^3u/L


 


Absolute Lymphs (auto)  1.10 L    (1.32-3.57)  x10^3/uL


 


Absolute Monos (auto)  1.10 H    (0.30-0.82)  x10^3/uL


 


Absolute Nucleated RBC  0.00    (0.00-0.012)  x10^3u/L


 


Lymphocytes %  14.1 L    (21.8-53.1)  %


 


Monocytes %  14.1 H    (5.3-12.2)  %


 


Eosinophils %  5.1    (0.8-7.0)  %


 


Basophils %  0.8    (0.2-1.2)  %


 


Absolute Granulocytes  5.10    (1.78-5.38)  x10^3/uL


 


Basophils #  0.06    (0.01-0.08)  x10^3/uL


 


PT    10.7  (9.4-12.5)  SECONDS


 


INR    0.98  (0.8-3.0)  


 


APTT    39.5 H  (25.1-36.5)  SECONDS


 


pO2/FiO2 Ratio     %


 


VBG pH     (7.32-7.42)  


 


VBG pCO2 at Pat Temp     (42-55)  mm/Hg


 


VBG pO2 at Pat Temp     (25-40)  mm/Hg


 


VBG HCO3     (22-28)  meq/L


 


VBG O2 Sat (Peña)     ()  


 


VBG Base Excess     (-2.0-2.0)  


 


VBG Hemoglobin     


 


VBG Carboxyhemoglobin     (0.0-6.9)  % T HGB


 


POC Potassium     (3.5-5.1)  


 


Sodium   142   (135-145)  mmol/L


 


Potassium   3.8   (3.5-5.1)  mmol/L


 


Chloride   104   ()  mmol/L


 


Carbon Dioxide   27   (22-30)  mmol/L


 


Anion Gap   15.1 H   (5-15)  MEQ/L


 


BUN   15   (9-20)  mg/dL


 


Creatinine   1.46 H   (0.66-1.25)  mg/dL


 


Estimated GFR   48.6   ML/MIN


 


Glucose   115 H   ()  mg/dL


 


POC Glucometer     (74 to 106)  mg/dL


 


Lactic Acid     (0.4-2.0)  


 


Calcium   9.0   (8.4-10.2)  mg/dL


 


Magnesium   2.3   (1.6-2.3)  mg/dL


 


Total Bilirubin   0.80   (0.2-1.3)  mg/dL


 


AST   26   (17-59)  U/L


 


ALT   17   (0-50)  U/L


 


Alkaline Phosphatase   88   ()  U/L


 


Troponin I     (0.000-0.033)  ng/mL


 


NT-Pro-B Natriuret Pep     (<300)  pg/mL


 


Serum Total Protein   6.9   (6.3-8.2)  g/dL


 


Albumin   3.9   (3.5-5.0)  g/dL


 


Prealbumin     (17.6-36.0)  mg/dL


 


Slides for Path Review  YES    


 


ABO Group     


 


Rh Factor     


 


Antibody Screen     (NEGATIVE)  


 


Crossmatch     (COMPATIBLE)  














  09/24/24 09/24/24 09/24/24 Range/Units





  17:20 17:20 17:23 


 


WBC     (4.23-9.07)  x10^3/uL


 


RBC     (4.63-6.08)  x10^6/uL


 


Hgb     (13.7-17.5)  g/dL


 


Hct     (40.1-51.0)  %


 


MCV     (79.0-92.2)  fL


 


MCH     (25.7-32.2)  pg


 


MCHC     (32.3-36.5)  g/dL


 


RDW     (11.6-14.4)  %


 


Plt Count     (163-337)  x10^3/uL


 


MPV     (9.4-12.4)  fL


 


Gran %     (34.0-67.9)  %


 


Immature Gran % (Auto)     (0.001-0.429)  %


 


Nucleat RBC Rel Count     (0.00-0.2)  %


 


Eos # (Auto)     (0.04-0.54)  x10^3/uL


 


Immature Gran # (Auto)     (0.001-0.031)  x10^3u/L


 


Absolute Lymphs (auto)     (1.32-3.57)  x10^3/uL


 


Absolute Monos (auto)     (0.30-0.82)  x10^3/uL


 


Absolute Nucleated RBC     (0.00-0.012)  x10^3u/L


 


Lymphocytes %     (21.8-53.1)  %


 


Monocytes %     (5.3-12.2)  %


 


Eosinophils %     (0.8-7.0)  %


 


Basophils %     (0.2-1.2)  %


 


Absolute Granulocytes     (1.78-5.38)  x10^3/uL


 


Basophils #     (0.01-0.08)  x10^3/uL


 


PT     (9.4-12.5)  SECONDS


 


INR     (0.8-3.0)  


 


APTT     (25.1-36.5)  SECONDS


 


pO2/FiO2 Ratio     %


 


VBG pH     (7.32-7.42)  


 


VBG pCO2 at Pat Temp     (42-55)  mm/Hg


 


VBG pO2 at Pat Temp     (25-40)  mm/Hg


 


VBG HCO3     (22-28)  meq/L


 


VBG O2 Sat (Pe[place])     ()  


 


VBG Base Excess     (-2.0-2.0)  


 


VBG Hemoglobin     


 


VBG Carboxyhemoglobin     (0.0-6.9)  % T HGB


 


POC Potassium     (3.5-5.1)  


 


Sodium     (135-145)  mmol/L


 


Potassium     (3.5-5.1)  mmol/L


 


Chloride     ()  mmol/L


 


Carbon Dioxide     (22-30)  mmol/L


 


Anion Gap     (5-15)  MEQ/L


 


BUN     (9-20)  mg/dL


 


Creatinine     (0.66-1.25)  mg/dL


 


Estimated GFR     ML/MIN


 


Glucose     ()  mg/dL


 


POC Glucometer     (74 to 106)  mg/dL


 


Lactic Acid    1.2  (0.4-2.0)  


 


Calcium     (8.4-10.2)  mg/dL


 


Magnesium     (1.6-2.3)  mg/dL


 


Total Bilirubin     (0.2-1.3)  mg/dL


 


AST     (17-59)  U/L


 


ALT     (0-50)  U/L


 


Alkaline Phosphatase     ()  U/L


 


Troponin I  < 0.012    (0.000-0.033)  ng/mL


 


NT-Pro-B Natriuret Pep   449   (<300)  pg/mL


 


Serum Total Protein     (6.3-8.2)  g/dL


 


Albumin     (3.5-5.0)  g/dL


 


Prealbumin     (17.6-36.0)  mg/dL


 


Slides for Path Review     


 


ABO Group     


 


Rh Factor     


 


Antibody Screen     (NEGATIVE)  


 


Crossmatch     (COMPATIBLE)  














  09/24/24 09/24/24 09/24/24 Range/Units





  17:23 18:39 18:39 


 


WBC     (4.23-9.07)  x10^3/uL


 


RBC     (4.63-6.08)  x10^6/uL


 


Hgb     (13.7-17.5)  g/dL


 


Hct     (40.1-51.0)  %


 


MCV     (79.0-92.2)  fL


 


MCH     (25.7-32.2)  pg


 


MCHC     (32.3-36.5)  g/dL


 


RDW     (11.6-14.4)  %


 


Plt Count     (163-337)  x10^3/uL


 


MPV     (9.4-12.4)  fL


 


Gran %     (34.0-67.9)  %


 


Immature Gran % (Auto)     (0.001-0.429)  %


 


Nucleat RBC Rel Count     (0.00-0.2)  %


 


Eos # (Auto)     (0.04-0.54)  x10^3/uL


 


Immature Gran # (Auto)     (0.001-0.031)  x10^3u/L


 


Absolute Lymphs (auto)     (1.32-3.57)  x10^3/uL


 


Absolute Monos (auto)     (0.30-0.82)  x10^3/uL


 


Absolute Nucleated RBC     (0.00-0.012)  x10^3u/L


 


Lymphocytes %     (21.8-53.1)  %


 


Monocytes %     (5.3-12.2)  %


 


Eosinophils %     (0.8-7.0)  %


 


Basophils %     (0.2-1.2)  %


 


Absolute Granulocytes     (1.78-5.38)  x10^3/uL


 


Basophils #     (0.01-0.08)  x10^3/uL


 


PT     (9.4-12.5)  SECONDS


 


INR     (0.8-3.0)  


 


APTT     (25.1-36.5)  SECONDS


 


pO2/FiO2 Ratio  21.0    %


 


VBG pH  7.43 H    (7.32-7.42)  


 


VBG pCO2 at Pat Temp  41 L    (42-55)  mm/Hg


 


VBG pO2 at Pat Temp  38    (25-40)  mm/Hg


 


VBG HCO3  27.2    (22-28)  meq/L


 


VBG O2 Sat (Pe[place])  66.4 L    ()  


 


VBG Base Excess  2.6 H    (-2.0-2.0)  


 


VBG Hemoglobin  7.6 L*    


 


VBG Carboxyhemoglobin  3.7    (0.0-6.9)  % T HGB


 


POC Potassium  3.7    (3.5-5.1)  


 


Sodium     (135-145)  mmol/L


 


Potassium     (3.5-5.1)  mmol/L


 


Chloride     ()  mmol/L


 


Carbon Dioxide     (22-30)  mmol/L


 


Anion Gap     (5-15)  MEQ/L


 


BUN     (9-20)  mg/dL


 


Creatinine     (0.66-1.25)  mg/dL


 


Estimated GFR     ML/MIN


 


Glucose     ()  mg/dL


 


POC Glucometer     (74 to 106)  mg/dL


 


Lactic Acid     (0.4-2.0)  


 


Calcium     (8.4-10.2)  mg/dL


 


Magnesium     (1.6-2.3)  mg/dL


 


Total Bilirubin     (0.2-1.3)  mg/dL


 


AST     (17-59)  U/L


 


ALT     (0-50)  U/L


 


Alkaline Phosphatase     ()  U/L


 


Troponin I     (0.000-0.033)  ng/mL


 


NT-Pro-B Natriuret Pep     (<300)  pg/mL


 


Serum Total Protein     (6.3-8.2)  g/dL


 


Albumin     (3.5-5.0)  g/dL


 


Prealbumin     (17.6-36.0)  mg/dL


 


Slides for Path Review     


 


ABO Group   A   


 


Rh Factor   POSITIVE   


 


Antibody Screen   NEGATIVE   (NEGATIVE)  


 


Crossmatch   COMPATIBLE  COMPATIBLE  (COMPATIBLE)  














  09/24/24 09/25/24 09/25/24 Range/Units





  21:52 05:00 05:00 


 


WBC    7.8  (4.23-9.07)  x10^3/uL


 


RBC    3.98 L  (4.63-6.08)  x10^6/uL


 


Hgb    8.4 L D  (13.7-17.5)  g/dL


 


Hct    29.4 L  (40.1-51.0)  %


 


MCV    73.9 L  (79.0-92.2)  fL


 


MCH    21.1 L  (25.7-32.2)  pg


 


MCHC    28.6 L  (32.3-36.5)  g/dL


 


RDW    18.4 H  (11.6-14.4)  %


 


Plt Count    402 H  (163-337)  x10^3/uL


 


MPV    11.2  (9.4-12.4)  fL


 


Gran %     (34.0-67.9)  %


 


Immature Gran % (Auto)     (0.001-0.429)  %


 


Nucleat RBC Rel Count     (0.00-0.2)  %


 


Eos # (Auto)     (0.04-0.54)  x10^3/uL


 


Immature Gran # (Auto)     (0.001-0.031)  x10^3u/L


 


Absolute Lymphs (auto)     (1.32-3.57)  x10^3/uL


 


Absolute Monos (auto)     (0.30-0.82)  x10^3/uL


 


Absolute Nucleated RBC     (0.00-0.012)  x10^3u/L


 


Lymphocytes %     (21.8-53.1)  %


 


Monocytes %     (5.3-12.2)  %


 


Eosinophils %     (0.8-7.0)  %


 


Basophils %     (0.2-1.2)  %


 


Absolute Granulocytes     (1.78-5.38)  x10^3/uL


 


Basophils #     (0.01-0.08)  x10^3/uL


 


PT     (9.4-12.5)  SECONDS


 


INR     (0.8-3.0)  


 


APTT     (25.1-36.5)  SECONDS


 


pO2/FiO2 Ratio     %


 


VBG pH     (7.32-7.42)  


 


VBG pCO2 at Pat Temp     (42-55)  mm/Hg


 


VBG pO2 at Pat Temp     (25-40)  mm/Hg


 


VBG HCO3     (22-28)  meq/L


 


VBG O2 Sat (Pe[place])     ()  


 


VBG Base Excess     (-2.0-2.0)  


 


VBG Hemoglobin     


 


VBG Carboxyhemoglobin     (0.0-6.9)  % T HGB


 


POC Potassium     (3.5-5.1)  


 


Sodium     (135-145)  mmol/L


 


Potassium     (3.5-5.1)  mmol/L


 


Chloride     ()  mmol/L


 


Carbon Dioxide     (22-30)  mmol/L


 


Anion Gap     (5-15)  MEQ/L


 


BUN     (9-20)  mg/dL


 


Creatinine     (0.66-1.25)  mg/dL


 


Estimated GFR     ML/MIN


 


Glucose     ()  mg/dL


 


POC Glucometer  134 H    (74 to 106)  mg/dL


 


Lactic Acid     (0.4-2.0)  


 


Calcium     (8.4-10.2)  mg/dL


 


Magnesium     (1.6-2.3)  mg/dL


 


Total Bilirubin     (0.2-1.3)  mg/dL


 


AST     (17-59)  U/L


 


ALT     (0-50)  U/L


 


Alkaline Phosphatase     ()  U/L


 


Troponin I   < 0.012   (0.000-0.033)  ng/mL


 


NT-Pro-B Natriuret Pep     (<300)  pg/mL


 


Serum Total Protein     (6.3-8.2)  g/dL


 


Albumin     (3.5-5.0)  g/dL


 


Prealbumin     (17.6-36.0)  mg/dL


 


Slides for Path Review    YES  


 


ABO Group     


 


Rh Factor     


 


Antibody Screen     (NEGATIVE)  


 


Crossmatch     (COMPATIBLE)  














  09/25/24 09/25/24 09/25/24 Range/Units





  05:00 07:32 08:21 


 


WBC     (4.23-9.07)  x10^3/uL


 


RBC     (4.63-6.08)  x10^6/uL


 


Hgb     (13.7-17.5)  g/dL


 


Hct     (40.1-51.0)  %


 


MCV     (79.0-92.2)  fL


 


MCH     (25.7-32.2)  pg


 


MCHC     (32.3-36.5)  g/dL


 


RDW     (11.6-14.4)  %


 


Plt Count     (163-337)  x10^3/uL


 


MPV     (9.4-12.4)  fL


 


Gran %     (34.0-67.9)  %


 


Immature Gran % (Auto)     (0.001-0.429)  %


 


Nucleat RBC Rel Count     (0.00-0.2)  %


 


Eos # (Auto)     (0.04-0.54)  x10^3/uL


 


Immature Gran # (Auto)     (0.001-0.031)  x10^3u/L


 


Absolute Lymphs (auto)     (1.32-3.57)  x10^3/uL


 


Absolute Monos (auto)     (0.30-0.82)  x10^3/uL


 


Absolute Nucleated RBC     (0.00-0.012)  x10^3u/L


 


Lymphocytes %     (21.8-53.1)  %


 


Monocytes %     (5.3-12.2)  %


 


Eosinophils %     (0.8-7.0)  %


 


Basophils %     (0.2-1.2)  %


 


Absolute Granulocytes     (1.78-5.38)  x10^3/uL


 


Basophils #     (0.01-0.08)  x10^3/uL


 


PT     (9.4-12.5)  SECONDS


 


INR     (0.8-3.0)  


 


APTT     (25.1-36.5)  SECONDS


 


pO2/FiO2 Ratio     %


 


VBG pH     (7.32-7.42)  


 


VBG pCO2 at Pat Temp     (42-55)  mm/Hg


 


VBG pO2 at Pat Temp     (25-40)  mm/Hg


 


VBG HCO3     (22-28)  meq/L


 


VBG O2 Sat (Peña)     ()  


 


VBG Base Excess     (-2.0-2.0)  


 


VBG Hemoglobin     


 


VBG Carboxyhemoglobin     (0.0-6.9)  % T HGB


 


POC Potassium     (3.5-5.1)  


 


Sodium  139    (135-145)  mmol/L


 


Potassium  3.7    (3.5-5.1)  mmol/L


 


Chloride  101    ()  mmol/L


 


Carbon Dioxide  29    (22-30)  mmol/L


 


Anion Gap  12.0    (5-15)  MEQ/L


 


BUN  15    (9-20)  mg/dL


 


Creatinine  1.54 H    (0.66-1.25)  mg/dL


 


Estimated GFR  45.6    ML/MIN


 


Glucose  155 H    ()  mg/dL


 


POC Glucometer   149 H   (74 to 106)  mg/dL


 


Lactic Acid     (0.4-2.0)  


 


Calcium  9.3    (8.4-10.2)  mg/dL


 


Magnesium     (1.6-2.3)  mg/dL


 


Total Bilirubin     (0.2-1.3)  mg/dL


 


AST     (17-59)  U/L


 


ALT     (0-50)  U/L


 


Alkaline Phosphatase     ()  U/L


 


Troponin I    < 0.012  (0.000-0.033)  ng/mL


 


NT-Pro-B Natriuret Pep     (<300)  pg/mL


 


Serum Total Protein     (6.3-8.2)  g/dL


 


Albumin     (3.5-5.0)  g/dL


 


Prealbumin  21.59    (17.6-36.0)  mg/dL


 


Slides for Path Review     


 


ABO Group     


 


Rh Factor     


 


Antibody Screen     (NEGATIVE)  


 


Crossmatch     (COMPATIBLE)  











Micro Results-Entire Visit: 





                                   Accuchecks











Date                           09/25/24


 


Time                           07:38

















- Radiology Exams


Ordered Rad Exams-Entire Visit: 





                              Radiology Procedures











 Category Date Time Status


 


 CHEST 1 VIEW (PORTABLE) Stat Exams  09/24/24 17:01 Completed














- Procedures and Test


Procedures and Tests throughout Hospitalization: 





                            Therapy Orders & Screens





09/24/24 21:04


Respiratory Therapy Assessment DAILY 


   Comment: 


   Diagnosis: Symptomatic anemia, shortness of breath





09/24/24 21:05


Oxygen Nasal Cannula 2 lpm 


   Comment: 


   Diagnosis: Symptomatic anemia, shortness of breath














Discharge Exam


General Appearance: no apparent distress, alert, obese


Neurologic Exam: alert, oriented x 3, cooperative, normal mood/affect, nml 

cerebellar function, sensation nml, No motor deficits


Eye Exam: PERRL, EOMI, eyes nml inspection


Ears, Nose, Throat Exam: normal ENT inspection, pharynx normal, moist mucous 

membranes


Neck Exam: normal inspection, non-tender, supple, full range of motion


Respiratory Exam: normal breath sounds, lungs clear, No respiratory distress


Cardiovascular Exam: regular rate/rhythm, normal heart sounds


Gastrointestinal/Abdomen Exam: soft, No tenderness, No mass


Male Genitalia Exam: deferred


Rectal Exam: deferred


Back Exam: normal inspection, normal range of motion, No CVA tenderness, No 

vertebral tenderness


Extremity Exam: normal inspection, normal range of motion


Skin Exam: normal color, warm, dry, pale


Wound Assessment: 





                              Skin/Wound Assessment





Wound/Incision Assessment                                  Start:  09/24/24 21

:03


Text:                                                      Status: Active       




Freq:   Q6H                                                                     




Protocol:                                                                       




 Document     09/25/24 08:00  VB  (Rec: 09/25/24 09:05  VB  [id number])


 Wound Photo


     Comment:                                    photos in chart from admission











Final Diagnosis/Problem List





- Final Discharge Diagnosis/Problem


(1) Symptomatic anemia


Current Visit: Yes   Status: Acute   


Assessment & Plan: 


- Hgb  on admission 6.9


- 2units PRBC last night


- HGb 8.4 today. 


- sxs resolved- pt wants to d/c. 


- start ferrous sulfate daily


Code(s): D64.9 - ANEMIA, UNSPECIFIED   





(2) Shortness of breath


Current Visit: Yes   Status: Resolved   


Assessment & Plan: 


- resolved


- 2:2 anemia


- RA 94%


Code(s): R06.02 - SHORTNESS OF BREATH   





(3) CHF (congestive heart failure)


Current Visit: No   Status: Acute   


Assessment & Plan: 


-Will monitor on tele. 


- Lasix as above. 


- Continue cardiac regimen and monitor clinical course. 


Code(s): I50.9 - HEART FAILURE, UNSPECIFIED   





(4) Hyperglycemia due to type 2 diabetes mellitus


Current Visit: Yes   Status: Acute   


Assessment & Plan: 


-Continue regimen and monitor sugars on ISS.


- Hgb A1C pending


Code(s): E11.65 - TYPE 2 DIABETES MELLITUS WITH HYPERGLYCEMIA   





(5) Obesity (BMI 30-39.9)


Current Visit: Yes   Status: Chronic   


Assessment & Plan: 


- advised diet and exercise control. 


Code(s): E66.9 - OBESITY, UNSPECIFIED   





- Discharge


Discharge Date: 09/25/24


Disposition: Home, Self-Care


Condition: Stable


Prescriptions: 


New


   Ferrous Sulfate 325 mg*** [Feosol 325 mg***] 325 mg PO DAILY 30 Days #30 

tablet





Continue


   Senna 8.6 mg*** [Senokot 8.6 mg***] 8.6 mg PO DAILY PRN PRN


     PRN Reason: Constipation


   Tamsulosin HCl 0.4 mg*** [Flomax 0.4 MG***] 0.4 mg PO HS


   Multivitamin [Multi-Vitamin Daily] 1 each PO DAILY


   Famotidine 20 mg*** [Pepcid 20 MG***] 40 mg PO QHS


   Rosuvastatin Calcium 10 mg PO HS


   PARoxetine HCL [Paroxetine HCl] 20 mg PO QHS


   Omega-3/Dha/Epa/Fish Oil [Fish Oil 1,000 mg Softgel] 2,000 mg PO BID


   Amlodipine Besylate [Norvasc] 5 mg PO QHS


   Albuterol Sulfate [Proair Hfa] 8.5 gm IH Q4-6HPRN PRN


     PRN Reason: Shortness Of Breath


   Clopidogrel Bisulfate [Plavix] 75 mg PO DAILY


   Aspirin EC 81 mg*** [Ecotrin 81 mg***] 81 mg PO DAILY


   Omeprazole 20 mg PO DAILY


   Dapagliflozin Propanediol [Farxiga] 10 mg PO DAILY


   Metoprolol Tartrate 25 mg*** [Lopressor 25MG Tab***] 25 mg PO BID


   Losartan Potassium 50 mg*** [Cozaar 50 MG***] 50 mg PO DAILY


   Insulin Degludec [Tresiba Flextouch U-100] 30 units SQ QHS


Instructions:  Anemia caused by low iron, Blood transfusion


Follow up with: 


RAJANI CHAPMAN MD [Primary Care Provider] - 10/02/24 10:30 am ([facility])

## 2024-10-08 ENCOUNTER — HOSPITAL ENCOUNTER (OUTPATIENT)
Dept: HOSPITAL 33 - ED | Age: 79
Setting detail: OBSERVATION
LOS: 2 days | Discharge: ANOTHER HOSPITAL | End: 2024-10-10
Attending: INTERNAL MEDICINE | Admitting: INTERNAL MEDICINE
Payer: MEDICARE

## 2024-10-08 DIAGNOSIS — G47.30: ICD-10-CM

## 2024-10-08 DIAGNOSIS — D64.9: ICD-10-CM

## 2024-10-08 DIAGNOSIS — M79.662: ICD-10-CM

## 2024-10-08 DIAGNOSIS — E11.9: ICD-10-CM

## 2024-10-08 DIAGNOSIS — E66.9: ICD-10-CM

## 2024-10-08 DIAGNOSIS — E78.5: ICD-10-CM

## 2024-10-08 DIAGNOSIS — G62.9: ICD-10-CM

## 2024-10-08 DIAGNOSIS — I65.22: ICD-10-CM

## 2024-10-08 DIAGNOSIS — Z79.899: ICD-10-CM

## 2024-10-08 DIAGNOSIS — R41.0: ICD-10-CM

## 2024-10-08 DIAGNOSIS — M79.661: ICD-10-CM

## 2024-10-08 DIAGNOSIS — Z79.01: ICD-10-CM

## 2024-10-08 DIAGNOSIS — R53.1: Primary | ICD-10-CM

## 2024-10-08 DIAGNOSIS — G25.81: ICD-10-CM

## 2024-10-08 LAB
ALBUMIN SERPL-MCNC: 3.8 G/DL (ref 3.5–5)
ALP SERPL-CCNC: 77 U/L (ref 38–126)
ALT SERPL-CCNC: 23 U/L (ref 0–50)
ANION GAP SERPL CALC-SCNC: 15 MEQ/L (ref 5–15)
APTT PPP: 60.6 SECONDS (ref 25.1–36.5)
AST SERPL QL: 34 U/L (ref 17–59)
BASOPHILS # BLD AUTO: 0.09 X10^3/UL (ref 0.01–0.08)
BASOPHILS NFR BLD AUTO: 1.2 % (ref 0.2–1.2)
BILIRUB BLD-MCNC: 0.8 MG/DL (ref 0.2–1.3)
BLD SMEAR INTERP: YES
BUN SERPL-MCNC: 13 MG/DL (ref 9–20)
CALCIUM SPEC-MCNC: 9.3 MG/DL (ref 8.4–10.2)
CHLORIDE SERPL-SCNC: 104 MMOL/L (ref 98–107)
CO2 SERPL-SCNC: 26 MMOL/L (ref 22–30)
CREAT SERPL-MCNC: 1.26 MG/DL (ref 0.66–1.25)
EOSINOPHIL # BLD AUTO: 0.35 X10^3/UL (ref 0.04–0.54)
GFR SERPLBLD BASED ON 1.73 SQ M-ARVRAT: 58 ML/MIN
GLUCOSE SERPL-MCNC: 233 MG/DL (ref 74–106)
GLUCOSE UR-MCNC: >=1000 MG/DL
HCT VFR BLD AUTO: 32.4 % (ref 40.1–51)
HGB BLD-MCNC: 9.1 G/DL (ref 13.7–17.5)
IMM GRANULOCYTES # BLD: 0.02 X10^3U/L (ref 0–0.03)
IMM GRANULOCYTES NFR BLD: 0.3 % (ref 0–0.43)
INR PPP: 0.99 (ref 0.8–3)
LYMPHOCYTES # SPEC AUTO: 0.91 X10^3/UL (ref 1.32–3.57)
MCH RBC QN AUTO: 21.6 PG (ref 25.7–32.2)
MCHC RBC AUTO-ENTMCNC: 28.1 G/DL (ref 32.3–36.5)
MONOCYTES # BLD AUTO: 0.74 X10^3/UL (ref 0.3–0.82)
NRBC # BLD AUTO: 0 X10^3U/L (ref 0–0.01)
NRBC BLD AUTO-RTO: 0 % (ref 0–0.2)
PLATELET # BLD AUTO: 381 X10^3/UL (ref 163–337)
POTASSIUM SERPLBLD-SCNC: 3.8 MMOL/L (ref 3.5–5.1)
PROT SERPL-MCNC: 6.8 G/DL (ref 6.3–8.2)
PROTHROMBIN TIME: 10.8 SECONDS (ref 9.4–12.5)
RBC # BLD AUTO: 4.21 X10^6/UL (ref 4.63–6.08)
RBC # URNS HPF: (no result) /HPF (ref 0–5)
SODIUM SERPL-SCNC: 141 MMOL/L (ref 135–145)
WBC # BLD AUTO: 7.7 X10^3/UL (ref 4.23–9.07)
WBC URNS QL MICRO: (no result) /HPF (ref 0–5)

## 2024-10-08 PROCEDURE — 82947 ASSAY GLUCOSE BLOOD QUANT: CPT

## 2024-10-08 PROCEDURE — 93041 RHYTHM ECG TRACING: CPT

## 2024-10-08 PROCEDURE — 86140 C-REACTIVE PROTEIN: CPT

## 2024-10-08 PROCEDURE — 85027 COMPLETE CBC AUTOMATED: CPT

## 2024-10-08 PROCEDURE — 97530 THERAPEUTIC ACTIVITIES: CPT

## 2024-10-08 PROCEDURE — 84443 ASSAY THYROID STIM HORMONE: CPT

## 2024-10-08 PROCEDURE — 72126 CT NECK SPINE W/DYE: CPT

## 2024-10-08 PROCEDURE — 97161 PT EVAL LOW COMPLEX 20 MIN: CPT

## 2024-10-08 PROCEDURE — 82607 VITAMIN B-12: CPT

## 2024-10-08 PROCEDURE — 83036 HEMOGLOBIN GLYCOSYLATED A1C: CPT

## 2024-10-08 PROCEDURE — G0378 HOSPITAL OBSERVATION PER HR: HCPCS

## 2024-10-08 PROCEDURE — 85730 THROMBOPLASTIN TIME PARTIAL: CPT

## 2024-10-08 PROCEDURE — 99285 EMERGENCY DEPT VISIT HI MDM: CPT

## 2024-10-08 PROCEDURE — 36415 COLL VENOUS BLD VENIPUNCTURE: CPT

## 2024-10-08 PROCEDURE — 85652 RBC SED RATE AUTOMATED: CPT

## 2024-10-08 PROCEDURE — 94640 AIRWAY INHALATION TREATMENT: CPT

## 2024-10-08 PROCEDURE — 85610 PROTHROMBIN TIME: CPT

## 2024-10-08 PROCEDURE — 80053 COMPREHEN METABOLIC PANEL: CPT

## 2024-10-08 PROCEDURE — 96360 HYDRATION IV INFUSION INIT: CPT

## 2024-10-08 PROCEDURE — 84484 ASSAY OF TROPONIN QUANT: CPT

## 2024-10-08 PROCEDURE — 83735 ASSAY OF MAGNESIUM: CPT

## 2024-10-08 PROCEDURE — 70470 CT HEAD/BRAIN W/O & W/DYE: CPT

## 2024-10-08 PROCEDURE — 97165 OT EVAL LOW COMPLEX 30 MIN: CPT

## 2024-10-08 PROCEDURE — 94760 N-INVAS EAR/PLS OXIMETRY 1: CPT

## 2024-10-08 PROCEDURE — 85025 COMPLETE CBC W/AUTO DIFF WBC: CPT

## 2024-10-08 PROCEDURE — 81001 URINALYSIS AUTO W/SCOPE: CPT

## 2024-10-08 PROCEDURE — 93005 ELECTROCARDIOGRAM TRACING: CPT

## 2024-10-08 PROCEDURE — 93880 EXTRACRANIAL BILAT STUDY: CPT

## 2024-10-08 PROCEDURE — 93922 UPR/L XTREMITY ART 2 LEVELS: CPT

## 2024-10-08 PROCEDURE — 96361 HYDRATE IV INFUSION ADD-ON: CPT

## 2024-10-08 PROCEDURE — 93268 ECG RECORD/REVIEW: CPT

## 2024-10-08 PROCEDURE — 72131 CT LUMBAR SPINE W/O DYE: CPT

## 2024-10-08 PROCEDURE — 36000 PLACE NEEDLE IN VEIN: CPT

## 2024-10-08 RX ADMIN — METOPROLOL TARTRATE SCH MG: 25 TABLET, FILM COATED ORAL at 22:48

## 2024-10-08 RX ADMIN — INSULIN GLARGINE SCH UNIT: 100 INJECTION, SOLUTION SUBCUTANEOUS at 22:46

## 2024-10-08 RX ADMIN — FAMOTIDINE SCH MG: 20 TABLET, FILM COATED ORAL at 22:47

## 2024-10-08 RX ADMIN — ROPINIROLE SCH MG: 0.5 TABLET ORAL at 22:48

## 2024-10-08 RX ADMIN — Medication SCH MG: at 22:48

## 2024-10-08 RX ADMIN — PAROXETINE HYDROCHLORIDE SCH MG: 20 TABLET, FILM COATED ORAL at 22:48

## 2024-10-08 RX ADMIN — TAMSULOSIN HYDROCHLORIDE SCH MG: 0.4 CAPSULE ORAL at 22:48

## 2024-10-08 RX ADMIN — SIMVASTATIN SCH MG: 20 TABLET, FILM COATED ORAL at 22:47

## 2024-10-08 RX ADMIN — SENNOSIDES SCH MG: 8.6 TABLET, FILM COATED ORAL at 22:48

## 2024-10-08 RX ADMIN — AMLODIPINE BESYLATE SCH MG: 5 TABLET ORAL at 22:48

## 2024-10-08 RX ADMIN — IPRATROPIUM BROMIDE AND ALBUTEROL SULFATE SCH ML: .5; 3 SOLUTION RESPIRATORY (INHALATION) at 18:31

## 2024-10-08 NOTE — XRAY
Indication: Bilateral lower extremity weakness 1 week.



Multiple contiguous axial images obtained through the cervical spine using 100

cc Isovue 370 contrast as ordered.



Comparison: None



Osseous structures demineralized.  Minimal/mild multilevel degenerative

endplate spurring greatest at C6-T1 levels.  Also mild/moderate multilevel

bilateral degenerative facet hypertrophy.  Sagittal and coronal reformatted

images demonstrates normal alignment.  C6-T1 disc space narrowing.  No acute

compression fracture, subluxation, or jumped facet.  Normal appearing

craniocervical junction.



Visualized contrast is soft tissues demonstrates a few tiny cervical and

submandibular lymph nodes bilaterally, none pathologically enlarged.  Major

arteries and veins are normal course and caliber with minimal bilateral

carotid calcifications.  Thyroid gland enhances homogeneously.  Visualized

lung apices clear.



Impression:

1.  Osteopenia, multilevel degenerative changes greatest at C6-T1, and

bilateral carotid calcifications.

2.  Remaining CT cervical spine with contrast exam is negative.

## 2024-10-08 NOTE — XRAY
Indication: Bilateral lower extremity weakness 1 week.



Multiple contiguous axial images obtained through the lumbar spine using 100

cc Isovue 370 contrast as ordered.  Sagittal and coronal reformatted images

obtained.



Comparison: CT abdomen/pelvis September 19, 2022



Osseous structures remain demineralized.  Grossly stable L1-S1 broad-based

disc osteophyte complex, L1-L5 degenerative vacuum disc phenomena, mild

multilevel bilateral degenerative facet arthropathy, and small inferior L1

Schmorl node.  No acute fracture, suspicious bony lesions, or spinal canal

stenosis.



Sagittal and coronal reformatted images again demonstrate normal lumbar

lordosis, mild dextroscoliosis centered at L3, and L1-L5 disc space narrowing.

 No acute compression fracture or subluxation.



Visualized contrasted soft tissues again demonstrates mild aortoiliac

calcifications, nonobstructing right renal punctate calculus, and left lower

renal exophytic cyst.



Impression:

1.  Stable osteopenia, multilevel degenerative spondylosis, dextroscoliosis,

L1 Schmorl node, arteriosclerotic disease, right renal micro-calculus, and

left renal cyst.

2.  Remaining CT lumbar spine with contrast exam is negative.

## 2024-10-08 NOTE — ERPHSYRPT
- History of Present Illness


Time Seen by Provider: 10/08/24 11:05


Source: patient


Exam Limitations: no limitations


Patient Subjective Stated Complaint: C/O BLE numbness for approx the past 2 

weeks. Patient indicates the numbness started in his feet and is progressing up 

his legs now.


Triage Nursing Assessment: Patient arrived by ambulance. He is alert and 

oriented. NO SOB. He is pale. No edema. Small, scattered scabs noted to BUE. 

Able to hold BLE off of the bed without difficulties. Patient indicates he can 

move his BLE without any issues he just can't take more than 3 steps on them 

because of the numbness and weakness.


Physician History: 


79-year-old male presents to our ED via EMS.  Patient was brought in from home 

for evaluation of generalized weakness.  Patient reports that he had a blood 

transfusion on September 24.  Since then he has been experiencing numbness of 

his lower extremities.  Patient states the numbness has gotten progressively 

worse.  Numbness associated with weakness as well.  Patient reports that he can 

only ambulate 3 steps before needing to sit down.  No associated pain.  Patient 

reports that he has a history of anemia.  He was last admitted for symptomatic 

anemia and transfused approximately 2 to 3 weeks ago.  Patient denies obvious 

blood loss but reports that his doctors are requesting that he obtain a 

colonoscopy which he has not done at this time.  Daughter at bedside.  They 

voiced no other complaints or concerns at this time.











Portions of this note were created with voice recognition technology.  There may

be grammatical, spelling, punctuation or sound alike errors





Timing/Duration: today


Severity: moderate


Associated Symptoms: other (numbness)


Allergies/Adverse Reactions: 








cephalexin [From Keflex] Allergy (Severe, Verified 09/24/24 20:14)


   kidneys shut down


codeine Allergy (Mild, Verified 09/24/24 20:14)


   Nausea and Vomiting


Sulfa (Sulfonamide Antibiotics) Allergy (Mild, Verified 09/24/24 20:14)


   hallucination





Home Medications: 








Multivitamin [Multi-Vitamin Daily] 1 each PO DAILY 01/17/16 [History]


Senna 8.6 mg*** [Senokot 8.6 mg***] 8.6 mg PO HS 01/17/16 [History]


Tamsulosin HCl 0.4 mg*** [Flomax 0.4 MG***] 0.4 mg PO HS 01/17/16 [History]


Famotidine 20 mg*** [Pepcid 20 MG***] 40 mg PO QHS 07/03/20 [History]


PARoxetine HCL [Paroxetine HCl] 20 mg PO QHS 02/02/21 [History]


Rosuvastatin Calcium 10 mg PO HS 02/02/21 [History]


Albuterol Sulfate [Proair Hfa] 8.5 gm IH Q4-6HPRN PRN 09/19/22 [History]


Amlodipine Besylate [Norvasc] 5 mg PO QHS 09/19/22 [History]


Omega-3/Dha/Epa/Fish Oil [Fish Oil 1,000 mg Softgel] 2,000 mg PO BID 09/19/22 

[History]


Aspirin EC 81 mg*** [Ecotrin 81 mg***] 81 mg PO DAILY 09/24/24 [History]


Clopidogrel Bisulfate [Plavix] 75 mg PO DAILY 09/24/24 [History]


Dapagliflozin Propanediol [Farxiga] 10 mg PO DAILY 09/24/24 [History]


Insulin Degludec [Tresiba Flextouch U-100] 30 units SQ QHS 09/24/24 [History]


Losartan Potassium 50 mg*** [Cozaar 50 MG***] 50 mg PO DAILY 09/24/24 [History]


Metoprolol Tartrate 25 mg*** [Lopressor 25MG Tab***] 25 mg PO BID 09/24/24 

[History]


Omeprazole 20 mg PO DAILY 09/24/24 [History]


Pnv,Calcium 72/Iron/Folic Acid [Prenatal Vitamin Plus Low Iron] 1 tab PO HS 10/0

8/24 [History]


Pnv,Calcium 72/Iron/Folic Acid [Prenatal Vitamin Plus Low Iron] 2 tab PO DAILY 

10/08/24 [History]





Hx Tetanus, Diphtheria Vaccination/Date Given: Yes


Hx Influenza Vaccination/Date Given: Yes


Hx Pneumococcal Vaccination/Date Given: Yes


Immunizations Up to Date: Yes





Travel Risk





- International Travel


Have you traveled outside of the country in past 3 weeks: No





- Emerging Infectious Disease


Are you exhibiting symptoms associated with any current EIDs: No


Symptoms: Cough: New Onset, Shortness of Breath


Comment: cough and sob present intermittently at baseline d/t copd





- Review of Systems


Constitutional: No Symptoms, No Fever, No Chills


Eyes: No Symptoms


Ears, Nose, & Throat: No Symptoms


Respiratory: No Symptoms, No Cough, No Dyspnea


Cardiac: No Symptoms, No Chest Pain, No Edema, No Syncope


Abdominal/Gastrointestinal: No Symptoms, No Abdominal Pain, No Nausea, No 

Vomiting, No Diarrhea


Genitourinary Symptoms: No Symptoms, No Dysuria


Musculoskeletal: No Symptoms, No Back Pain, No Neck Pain


Skin: No Symptoms, No Rash


Neurological: No Symptoms, No Dizziness, No Focal Weakness, No Sensory Changes


Psychological: No Symptoms


Endocrine: No Symptoms


Hematologic/Lymphatic: No Symptoms


Immunological/Allergic: No Symptoms


All Other Systems: Reviewed and Negative





- Past Medical History


Pertinent Past Medical History: Yes


Neurological History: Peripheral Neuropathy


ENT History: No Pertinent History


Cardiac History: Arrhythmia, Deep Vein Thrombosis, High Cholesterol, 

Hypertension


Respiratory History: COPD, Pulmonary Embolism, Sleep Apnea


Endocrine Medical History: Diabetes Type II


Musculoskeletal History: Osteoarthritis


GI Medical History: GERD, Gallbladder Disease


 History: No Pertinent History


Psycho-Social History: Depression


Male Reproductive Disorders: Prostate Problems


Other Medical History: blood clot, anemia





- Past Surgical History


Past Surgical History: Yes


Neuro Surgical History: No Pertinent History


Cardiac: Cardiac Catheterization, Internal Defibrillator, Pacemaker


Respiratory: No Pertinent History


Gastrointestinal: Cholecystectomy


Genitourinary: No Pertinent History


Musculoskeletal: Orthopedic Surgery


Male Surgical History: Other


Other Surgical History: dental surgical extractions,left knee, tear of tendons 

repaired, watchman procedure in May 2024, Cardiologist: Dr. Perez/Becky,


Significant Family History: diabetes, hypertension





- Social History


Smoking Status: Former smoker


How long have you smoked: 42 years


Exposure to second hand smoke: No


Alcohol Use: None


Drug Use: none


Patient Lives Alone: No





- Social Determinants of Health


Will the patient participate in the screening: Yes


Do you worry about a steady place to live?: No


Do you have any problems with any of the following?: No known problems


In the past 12 months,have you had to go without utilities?: No


Transportation Issues: No


Has anyone in your support network made you feel unsafe?: No


Have you or anyone in your house had to go without enough: No





- Nursing Vital Signs


Nursing Vital Signs: 


                               Initial Vital Signs











Temperature  97.5 F   10/08/24 10:57


 


Pulse Rate  65   10/08/24 10:57


 


Respiratory Rate  17   10/08/24 10:57


 


Blood Pressure  156/76   10/08/24 10:57


 


O2 Sat by Pulse Oximetry  99   10/08/24 10:57








                                   Pain Scale











Pain Intensity                 0

















- Physical Exam


General Appearance: no apparent distress, alert, other (Pale appearance)


Eye Exam: PERRL/EOMI, eyes nml inspection


Ears, Nose, Throat Exam: normal ENT inspection, TMs normal, pharynx normal, 

moist mucous membranes


Neck Exam: normal inspection, non-tender, supple, full range of motion


Respiratory Exam: normal breath sounds, lungs clear, airway intact, No 

respiratory distress


Cardiovascular Exam: regular rate/rhythm, normal heart sounds, normal peripheral

 pulses


Gastrointestinal/Abdomen Exam: soft, normal bowel sounds, No tenderness, No mass


Back Exam: normal inspection, normal range of motion, No CVA tenderness, No 

vertebral tenderness


Extremity Exam: normal inspection, normal range of motion, pelvis stable


Neurologic Exam: alert, oriented x 3, cooperative, normal mood/affect, nml 

cerebellar function, nml station & gait, sensation nml, No motor deficits


Skin Exam: normal color, warm, dry, No rash


Lymphatic Exam: No adenopathy


**SpO2 Interpretation**: normal


SpO2: 99


O2 Delivery: Room Air





- Course


Nursing assessment & vital signs reviewed: Yes


EKG Interpreted by Me: RATE ([id number]), Sinus Rhythm, NORMAL AXIS, NORMAL INTERVALS, 

Right Bundle Branch Block


Ordered Tests: 


                               Active Orders 24 hr











 Category Date Time Status


 


 Bedrest ROUTINE Activity  10/08/24 14:02 Active


 


 Call Admit Doctor for Orders ON ADMISSION Care  10/08/24 14:02 Active


 


 Cardiac Monitor ROUTINE Care  10/08/24 14:02 Active


 


 Cardiac Monitor STAT Care  10/08/24 11:27 Completed


 


 Code Status Order ROUTINE Care  10/08/24 14:02 Active


 


 EKG-ER Only STAT Care  10/08/24 11:27 Completed


 


 IV Insertion STAT Care  10/08/24 11:27 Completed


 


 Neuro Checks Q4H Care  10/08/24 14:02 Active


 


 Place in Observation ROUTINE Care  10/08/24 14:02 Active


 


 Pulse Oximetry (ED) STAT Care  10/08/24 11:27 Completed


 


 Telemetry q6h Care  10/08/24 14:02 Active


 


 CBC W DIFF Stat Lab  10/08/24 11:27 Completed


 


 CMP Stat Lab  10/08/24 11:27 Completed


 


 MAG [MAGNESIUM] Stat Lab  10/08/24 11:56 Completed


 


 PROTIME WITH INR Stat Lab  10/08/24 11:27 Completed


 


 PTT Stat Lab  10/08/24 11:27 Completed


 


 TROPONIN Q4H Lab  10/08/24 11:30 Completed


 


 TROPONIN Q4H Lab  10/08/24 14:50 Completed


 


 TROPONIN Q4H Lab  10/08/24 18:21 Completed


 


 UA W/RFX UR CULTURE Stat Lab  10/08/24 12:09 Completed


 


 Pulse Oximetry .spot check RT  10/08/24 14:02 Active








Medication Summary











Generic Name Dose Route Start Last Admin





  Trade Name Freq  PRN Reason Stop Dose Admin


 


Albuterol Sulfate  2 puff  10/08/24 15:15 





  Albuterol Common Canister Inhaler  IH  11/07/24 15:14 





  Q4H PRN PRN  





  SHORTNESS OF BREATH  


 


Albuterol/Ipratropium  3 ml  10/08/24 19:00  10/08/24 18:31





  Ipratropium/Albuterol Sulfate 3 Ml Ampul.Neb  IH  11/07/24 18:59  3 ml





  HS JOYCELYN   Administration


 


Amlodipine Besylate  5 mg  10/08/24 22:00  10/08/24 22:48





  Amlodipine Besylate 5 Mg Tablet  PO  11/07/24 21:59  5 mg





  QHS JOYCELYN   Administration


 


Aspirin  81 mg  10/09/24 10:00 





  Aspirin 81 Mg Tablet.Ec  PO  11/08/24 09:59 





  DAILY JOYCELYN  


 


Clopidogrel Bisulfate  75 mg  10/09/24 10:00 





  Clopidogrel Bisulfate 75 Mg Tablet  PO  11/08/24 09:59 





  DAILY JOYCELYN  


 


Famotidine  40 mg  10/08/24 22:00  10/08/24 22:47





  Famotidine 20 Mg Tablet  PO  11/07/24 21:59  40 mg





  QHS JOYCELYN   Administration


 


Fish Oil  2,000 mg  10/08/24 22:00  10/08/24 22:48





  Omega-3 Fatty Acids/Fish Oil 1000 Mg Capsule  PO  11/07/24 21:59  2,000 mg





  BID JOYCELYN   Administration


 


Insulin Glargine  30 unit  10/08/24 22:00  10/08/24 22:46





  Insulin Glargine** 1 Unit  SQ  11/07/24 21:59  30 unit





  HS JOYCELYN   Administration


 


Insulin Human Lispro  0 unit  10/08/24 15:52 





  Insulin Lispro 1 Unit  SQ  11/07/24 15:51 





  UD PRN  





  HYPERGLYCEMIA  


 


Losartan Potassium  50 mg  10/09/24 10:00 





  Losartan Potassium 50 Mg Tablet  PO  11/08/24 09:59 





  DAILY JOYCELYN  


 


Metoprolol Tartrate  25 mg  10/08/24 22:00  10/08/24 22:48





  Metoprolol Tartrate 25 Mg Tab  PO  11/07/24 21:59  25 mg





  BID JOYCELYN   Administration


 


Miscellaneous Information  0 each  10/08/24 15:30 





  Medication Intervention 1 Each Each    11/07/24 15:29 





  .RN TO CHECK WITH PT JOYCELYN  


 


Multivitamins Therapeutic  1 tab  10/09/24 10:00 





  Multivitamins,Therapeutic 1 Tab Tab  PO  11/08/24 09:59 





  DAILY JOYCELYN  


 


Pantoprazole Sodium  40 mg  10/09/24 10:00 





  Protonix (Pantoprazole) 40 Mg Tablet  PO  11/08/24 09:59 





  DAILY JOYCELYN  


 


Paroxetine HCl  20 mg  10/08/24 22:00  10/08/24 22:48





  Paroxetine Hcl 20 Mg Tablet  PO  11/07/24 21:59  20 mg





  QHS JOYCELYN   Administration


 


Ropinirole HCl  0.5 mg  10/08/24 22:00  10/08/24 22:48





  Ropinirole Hcl 0.5 Mg Tablet  PO  11/07/24 21:59  0.5 mg





  HS JOYCELYN   Administration


 


Senna  8.6 mg  10/08/24 22:00  10/08/24 22:48





  Senna 8.6 Mg Tablet  PO  11/07/24 21:59  8.6 mg





  HS JOYCELYN   Administration


 


Simvastatin  20 mg  10/08/24 22:00  10/08/24 22:47





  Simvastatin 20 Mg Tablet  PO  11/07/24 21:59  20 mg





  HS JOYCELYN   Administration


 


Tamsulosin HCl  0.4 mg  10/08/24 22:00  10/08/24 22:48





  Tamsulosin Hcl 0.4 Mg Cap  PO  11/07/24 21:59  0.4 mg





  HS JOYCELYN   Administration














Discontinued Medications














Generic Name Dose Route Start Last Admin





  Trade Name Freq  PRN Reason Stop Dose Admin


 


Sodium Chloride  1,000 mls @ 100 mls/hr  10/08/24 11:30  10/08/24 11:35





  Sodium Chloride 0.9% 1000 Ml  IV  11/07/24 11:29  100 mls/hr





  .Q10H JOYCELYN   Administration


 


Non-Formulary Medication  1 tab  10/08/24 22:00 





  Pnv,Calcium 72/Iron/Folic Acid [Prenatal Vitamin Plus Low Iron]  PO  11/07/24 

21:59 





  HS JOYCELYN  


 


Non-Formulary Medication  2 tab  10/09/24 10:00 





  Pnv,Calcium 72/Iron/Folic Acid [Prenatal Vitamin Plus Low Iron]  PO  11/08/24 

09:59 





  DAILY JOYCELYN  











Lab/Rad Data: 


                           Laboratory Result Diagrams





                                 10/08/24 11:27 





                                 10/08/24 11:27 





                               Laboratory Results











  10/08/24 10/08/24 10/08/24 Range/Units





  12:09 11:56 11:30 


 


WBC     (4.23-9.07)  x10^3/uL


 


RBC     (4.63-6.08)  x10^6/uL


 


Hgb     (13.7-17.5)  g/dL


 


Hct     (40.1-51.0)  %


 


MCV     (79.0-92.2)  fL


 


MCH     (25.7-32.2)  pg


 


MCHC     (32.3-36.5)  g/dL


 


RDW     (11.6-14.4)  %


 


Plt Count     (163-337)  x10^3/uL


 


MPV     (9.4-12.4)  fL


 


Gran %     (34.0-67.9)  %


 


Immature Gran % (Auto)     (0.001-0.429)  %


 


Nucleat RBC Rel Count     (0.00-0.2)  %


 


Eos # (Auto)     (0.04-0.54)  x10^3/uL


 


Immature Gran # (Auto)     (0.001-0.031)  x10^3u/L


 


Absolute Lymphs (auto)     (1.32-3.57)  x10^3/uL


 


Absolute Monos (auto)     (0.30-0.82)  x10^3/uL


 


Absolute Nucleated RBC     (0.00-0.012)  x10^3u/L


 


Lymphocytes %     (21.8-53.1)  %


 


Monocytes %     (5.3-12.2)  %


 


Eosinophils %     (0.8-7.0)  %


 


Basophils %     (0.2-1.2)  %


 


Absolute Granulocytes     (1.78-5.38)  x10^3/uL


 


Basophils #     (0.01-0.08)  x10^3/uL


 


PT     (9.4-12.5)  SECONDS


 


INR     (0.8-3.0)  


 


APTT     (25.1-36.5)  SECONDS


 


Sodium     (135-145)  mmol/L


 


Potassium     (3.5-5.1)  mmol/L


 


Chloride     ()  mmol/L


 


Carbon Dioxide     (22-30)  mmol/L


 


Anion Gap     (5-15)  MEQ/L


 


BUN     (9-20)  mg/dL


 


Creatinine     (0.66-1.25)  mg/dL


 


Estimated GFR     ML/MIN


 


Glucose     ()  mg/dL


 


Calcium     (8.4-10.2)  mg/dL


 


Magnesium   2.2   (1.6-2.3)  mg/dL


 


Total Bilirubin     (0.2-1.3)  mg/dL


 


AST     (17-59)  U/L


 


ALT     (0-50)  U/L


 


Alkaline Phosphatase     ()  U/L


 


Troponin I    < 0.012  (0.000-0.033)  ng/mL


 


Serum Total Protein     (6.3-8.2)  g/dL


 


Albumin     (3.5-5.0)  g/dL


 


Urine Color  Yellow    (Yellow)  


 


Urine Appearance  Clear    (Clear)  


 


Urine pH  6.0    (4.6-8.0)  


 


Ur Specific Gravity  >=1.030 A    (1.005-1.030)  


 


Urine Protein  Negative    (Negative)  


 


Urine Glucose (UA)  >=1000 A    (Negative)  mg/dL


 


Urine Ketones  Negative    (Negative)  


 


Urine Blood  Negative    (Negative)  


 


Urine Nitrite  Negative    (Negative)  


 


Urine Bilirubin  Negative    (Negative)  


 


Urine Urobilinogen  0.2    (0.2)  mg/dL


 


Ur Leukocyte Esterase  Negative    (Negative)  


 


U Hyaline Cast (Auto)  NONE SEEN    (0-2)  /LPF


 


Urine Microscopic RBC  0-2    (0-5)  /HPF


 


Urine Microscopic WBC  0-2    (0-5)  /HPF


 


Ur Epithelial Cells  None Seen    (None Seen)  /HPF


 


Urine Bacteria  None Seen    (None Seen)  /HPF


 


Urine Culture Reflexed  NO    (NO)  


 


Slides for Path Review     














  10/08/24 10/08/24 10/08/24 Range/Units





  11:27 11:27 11:27 


 


WBC    7.7  (4.23-9.07)  x10^3/uL


 


RBC    4.21 L  (4.63-6.08)  x10^6/uL


 


Hgb    9.1 L  (13.7-17.5)  g/dL


 


Hct    32.4 L  (40.1-51.0)  %


 


MCV    77.0 L  (79.0-92.2)  fL


 


MCH    21.6 L  (25.7-32.2)  pg


 


MCHC    28.1 L  (32.3-36.5)  g/dL


 


RDW    21.1 H  (11.6-14.4)  %


 


Plt Count    381 H  (163-337)  x10^3/uL


 


MPV    10.7  (9.4-12.4)  fL


 


Gran %    72.6 H  (34.0-67.9)  %


 


Immature Gran % (Auto)    0.3  (0.001-0.429)  %


 


Nucleat RBC Rel Count    0.0  (0.00-0.2)  %


 


Eos # (Auto)    0.35  (0.04-0.54)  x10^3/uL


 


Immature Gran # (Auto)    0.02  (0.001-0.031)  x10^3u/L


 


Absolute Lymphs (auto)    0.91 L  (1.32-3.57)  x10^3/uL


 


Absolute Monos (auto)    0.74  (0.30-0.82)  x10^3/uL


 


Absolute Nucleated RBC    0.00  (0.00-0.012)  x10^3u/L


 


Lymphocytes %    11.8 L  (21.8-53.1)  %


 


Monocytes %    9.6  (5.3-12.2)  %


 


Eosinophils %    4.5  (0.8-7.0)  %


 


Basophils %    1.2  (0.2-1.2)  %


 


Absolute Granulocytes    5.62 H  (1.78-5.38)  x10^3/uL


 


Basophils #    0.09 H  (0.01-0.08)  x10^3/uL


 


PT  10.8    (9.4-12.5)  SECONDS


 


INR  0.99    (0.8-3.0)  


 


APTT  60.6 H    (25.1-36.5)  SECONDS


 


Sodium   141   (135-145)  mmol/L


 


Potassium   3.8   (3.5-5.1)  mmol/L


 


Chloride   104   ()  mmol/L


 


Carbon Dioxide   26   (22-30)  mmol/L


 


Anion Gap   15.0   (5-15)  MEQ/L


 


BUN   13   (9-20)  mg/dL


 


Creatinine   1.26 H   (0.66-1.25)  mg/dL


 


Estimated GFR   58.0   ML/MIN


 


Glucose   233 H   ()  mg/dL


 


Calcium   9.3   (8.4-10.2)  mg/dL


 


Magnesium     (1.6-2.3)  mg/dL


 


Total Bilirubin   0.80   (0.2-1.3)  mg/dL


 


AST   34   (17-59)  U/L


 


ALT   23   (0-50)  U/L


 


Alkaline Phosphatase   77   ()  U/L


 


Troponin I     (0.000-0.033)  ng/mL


 


Serum Total Protein   6.8   (6.3-8.2)  g/dL


 


Albumin   3.8   (3.5-5.0)  g/dL


 


Urine Color     (Yellow)  


 


Urine Appearance     (Clear)  


 


Urine pH     (4.6-8.0)  


 


Ur Specific Gravity     (1.005-1.030)  


 


Urine Protein     (Negative)  


 


Urine Glucose (UA)     (Negative)  mg/dL


 


Urine Ketones     (Negative)  


 


Urine Blood     (Negative)  


 


Urine Nitrite     (Negative)  


 


Urine Bilirubin     (Negative)  


 


Urine Urobilinogen     (0.2)  mg/dL


 


Ur Leukocyte Esterase     (Negative)  


 


U Hyaline Cast (Auto)     (0-2)  /LPF


 


Urine Microscopic RBC     (0-5)  /HPF


 


Urine Microscopic WBC     (0-5)  /HPF


 


Ur Epithelial Cells     (None Seen)  /HPF


 


Urine Bacteria     (None Seen)  /HPF


 


Urine Culture Reflexed     (NO)  


 


Slides for Path Review    YES  














- Progress


Progress: improved


Progress Note: 


79-year-old male presents to emergency department for evaluation of generalized 

weakness and difficulty walking.  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

Barr syndrome.  Patient require hospitalization for further evaluation and 

treatment.








Case discussed with nurse practitioner who accepts admission to observation at 

approximately 2 PM.








Portions of this note were created with voice recognition technology.  There may

be grammatical, spelling, punctuation or sound alike errors











Complexity of problem addressed is moderate acute complicated.  No critical care

time.  Complexity of data reviewed and analyzed is extensive.  Test ordered test

reviewed results analyzed and correlated clinically with history and physical 

exam.  Management discussed with accepting nurse practitioner.  Risk of 

complication and or risk of morbidity/mortality of patient management is high.  

Patient requires hospitalization for further evaluation and treatment.  Vital 

stable.  Time spent to discharge patient is approximately 20 minutes.  Plan of 

care established for shared decision making.  No social determinants of health 

present to impede follow-up











Portions of this note were created with voice recognition technology.  There may

be grammatical, spelling, punctuation or sound alike errors


10/09/24 04:27











Counseled pt/family regarding: lab results, diagnosis





- Departure


Departure Disposition: Observation


Clinical Impression: 


 Generalized weakness, Pale skin, Elevated serum creatinine, Microcytic anemia





Condition: Stable


Critical Care Time: No

## 2024-10-08 NOTE — XRAY
Indication: Bilateral lower extremity weakness 1 week.



Multiple contiguous axial images obtained through the head prior to and

following 100 cc Isovue 370 contrast as ordered.



Comparison: March 15, 2021



Again age-appropriate global atrophy and mild periventricular degenerative

micro-ischemia bilaterally.  No acute intracranial hemorrhage, abnormal

extra-axial fluid collection, or mass effect.  Postcontrast images are

negative for adenoma enhancing intra-or extra-axial mass.  Fourth ventricle is

midline.  Bony calvarium intact.  Visualized paranasal sinuses and mastoid air

cells are clear.



Impression: Again nonacute senile brain.  Negative contrast exam.

## 2024-10-08 NOTE — PCM.HP
History of Present Illness





- Chief Complaint


Chief Complaint: Lower extremity numbness and weakness


Date: 10/08/24


History of Present Illness: 


 is a 79 year old male with PMHX of iron def. anemia, HTN, 

hyperlipidemia, PE, A-fib, DVT, Sleep apnea, Type II DM, peripheral neuropathy, 

OA, chronic obesity, and GERD. He presented o our ED via EMS today.  Patient was

brought in from home for evaluation of generalized weakness.  Patient reports 

that he had a blood transfusion on September 24.  Since then he has been 

experiencing numbness of his lower extremities and progressive weakness.  

Patient states the numbness has gotten progressively worse. He has a hx of 

peripheral neuropathy but was told by PCP his sxs are r/t his anemia and takes 

no meds for this. He states he has cramping of his leg BL from hips down at 

times. He denies loss of bowel or bladder. He reports chronic lumbar bacl pain 

with cramping. He  Patient reports that he can only ambulate 3 steps before 

needing to sit down. Walking makes his back pain worse. Patient denies obvious 

blood loss since last admission and Hgb 9 today. This is improved from last 

visit. He was to f/u OP for colonoscopy which he has not done yet.  Daughter at 

bedside and all questions and conerns answered. She reports pt has intermittent 

confusion. Labs done in ER overall non-concerning. CT head, cervical, and lumbar

spine ordered. Will consider neurology consult. More labs ordered for further 

eval of sxs. He denies CP, SOB, abd. pain, N/V/D. 








- Review of Systems


Constitutional: Weakness, No Fever, No Chills


Eyes: No Symptoms


Ears, Nose, & Throat: No Symptoms


Respiratory: No Cough, No Short Of Breath


Cardiac: No Chest Pain, No Edema, No Syncope


Abdominal/Gastrointestinal: No Abdominal Pain, No Nausea, No Vomiting, No 

Diarrhea


Genitourinary Symptoms: No Dysuria


Musculoskeletal: Back Pain, No Neck Pain


Skin: No Rash


Neurological: Parasthesia (BLLE numbness), No Dizziness, No Focal Weakness, No 

Sensory Changes


Psychological: No Symptoms


Endocrine: No Symptoms


Hematologic/Lymphatic: No Symptoms


Immunological/Allergic: No Symptoms





Medications & Allergies


Home Medications: 


                              Home Medication List





Multivitamin [Multi-Vitamin Daily] 1 each PO DAILY 01/17/16 [History Confirmed 

10/08/24]


Senna 8.6 mg*** [Senokot 8.6 mg***] 8.6 mg PO HS 01/17/16 [History Confirmed 

10/08/24]


Tamsulosin HCl 0.4 mg*** [Flomax 0.4 MG***] 0.4 mg PO HS 01/17/16 [History 

Confirmed 10/08/24]


Famotidine 20 mg*** [Pepcid 20 MG***] 40 mg PO QHS 07/03/20 [History Confirmed 

10/08/24]


PARoxetine HCL [Paroxetine HCl] 20 mg PO QHS 02/02/21 [History Confirmed 

10/08/24]


Rosuvastatin Calcium 10 mg PO HS 02/02/21 [History Confirmed 10/08/24]


Albuterol Sulfate [Proair Hfa] 8.5 gm IH Q4-6HPRN PRN 09/19/22 [History 

Confirmed 10/08/24]


Amlodipine Besylate [Norvasc] 5 mg PO QHS 09/19/22 [History Confirmed 10/08/24]


Omega-3/Dha/Epa/Fish Oil [Fish Oil 1,000 mg Softgel] 2,000 mg PO BID 09/19/22 

[History Confirmed 10/08/24]


Aspirin EC 81 mg*** [Ecotrin 81 mg***] 81 mg PO DAILY 09/24/24 [History 

Confirmed 10/08/24]


Clopidogrel Bisulfate [Plavix] 75 mg PO DAILY 09/24/24 [History Confirmed 

10/08/24]


Dapagliflozin Propanediol [Farxiga] 10 mg PO DAILY 09/24/24 [History Confirmed 

10/08/24]


Insulin Degludec [Tresiba Flextouch U-100] 30 units SQ QHS 09/24/24 [History 

Confirmed 10/08/24]


Losartan Potassium 50 mg*** [Cozaar 50 MG***] 50 mg PO DAILY 09/24/24 [History 

Confirmed 10/08/24]


Metoprolol Tartrate 25 mg*** [Lopressor 25MG Tab***] 25 mg PO BID 09/24/24 

[History Confirmed 10/08/24]


Omeprazole 20 mg PO DAILY 09/24/24 [History Confirmed 10/08/24]


Pnv,Calcium 72/Iron/Folic Acid [Prenatal Vitamin Plus Low Iron] 1 tab PO HS 

10/08/24 [History Confirmed 10/08/24]


Pnv,Calcium 72/Iron/Folic Acid [Prenatal Vitamin Plus Low Iron] 2 tab PO DAILY 

10/08/24 [History Confirmed 10/08/24]








Allergies/Adverse Reactions: 


                                    Allergies











Allergy/AdvReac Type Severity Reaction Status Date / Time


 


cephalexin [From Keflex] Allergy Severe kidneys Verified 09/24/24 20:14





   shut down  


 


codeine Allergy Mild Nausea and Verified 09/24/24 20:14





   Vomiting  


 


Sulfa (Sulfonamide Allergy Mild hallucinati Verified 09/24/24 20:14





Antibiotics)   on  














- Past Medical History


Past Medical History: Yes


Neurological History: Peripheral Neuropathy


ENT History: No Pertinent History


Cardiac History: Arrhythmia, Deep Vein Thrombosis, High Cholesterol, 

Hypertension


Respiratory History: COPD, Pulmonary Embolism, Sleep Apnea


Endocrine Medical History: Diabetes Type II


Musculoskelatal History: Osteoarthritis


GI Medical History: GERD, Gallbladder Disease


 History: No Pertinent History


Pyscho-Social History: Depression


Male Reproductive Disorders: Prostate Problems


Comment: anemia





- Past Surgical History


Past Surgical History: Yes


Neuro Surgical History: No Pertinent History


Cardiac History: Cardiac Catheterization, Internal Defibrillator, Pacemaker


Respiratory Surgery: No Pertinent History


GI Surgical History: Cholecystectomy


Genitourinary Surgical Hx: No Pertinent History


Musculskeletal Surgical Hx: Orthopedic Surgery


Male Surgical History: Other


Other Surgical History: dental surgical extractions,left knee, tear of tendons 

repaired, watchman procedure in May 2024, Cardiologist: Dr. Perez/Becky,


Significant Family History: diabetes, hypertension





- Social History


Smoking Status: Former smoker


How long have you smoked: 42 years


Exposure to second hand smoke: No


Alcohol: None


Drug Use: none





- Social Determinants of Health


Will the patient participate in the screening: Yes


Do you worry about a steady place to live?: No


Do you have any problems with any of the following?: No known problems


In the past 12 months,have you had to go without utilities?: No


Have you or anyone in your house had to go without enough: No


Transportation Issues: No


Has anyone in your support network made you feel unsafe?: No


Does the patient want assistance with any of the above?: No





- Physical Exam


Vital Signs: 


                               Vital Signs - 24 hr











  Temp Pulse Resp BP BP Pulse Ox


 


 10/08/24 14:23  98.0 F  64  22   118/64  96


 


 10/08/24 13:01   65  22  128/75   96


 


 10/08/24 12:31   66  18  139/75   97


 


 10/08/24 12:01       99


 


 10/08/24 12:00   66  31 H  125/60   97


 


 10/08/24 11:39       96


 


 10/08/24 11:30   63  24  133/69   97


 


 10/08/24 11:01   66  25 H  148/83  


 


 10/08/24 10:57  97.5 F  65  17   156/76  99











General Appearance: no apparent distress, alert, obese


Neurologic Exam: alert, oriented x 3, cooperative, normal mood/affect, nml 

cerebellar function, nml station & gait, sensation nml, No motor deficits


Eye Exam: PERRL/EOMI, eyes nml inspection


Ears, Nose, Throat Exam: normal ENT inspection, TMs normal, pharynx normal, 

moist mucous membranes


Neck Exam: normal inspection, non-tender, supple, full range of motion


Respiratory Exam: normal breath sounds, lungs clear, No respiratory distress


Cardiovascular Exam: regular rate/rhythm, normal heart sounds, normal peripheral

pulses


Gastrointestinal/Abdomen Exam: soft, normal bowel sounds, No tenderness, No mass


Back Exam: normal inspection, normal range of motion, No CVA tenderness, No 

vertebral tenderness


Extremity Exam: normal inspection, normal range of motion, pelvis stable


Skin Exam: normal color, warm, dry, No rash


Lymphatic Exam: No adenopathy





Results





- Labs


Lab/Micro Results: 


                            Lab Results-Last 24 Hours











  10/08/24 10/08/24 10/08/24 Range/Units





  11:27 11:27 11:27 


 


WBC  7.7    (4.23-9.07)  x10^3/uL


 


RBC  4.21 L    (4.63-6.08)  x10^6/uL


 


Hgb  9.1 L    (13.7-17.5)  g/dL


 


Hct  32.4 L    (40.1-51.0)  %


 


MCV  77.0 L    (79.0-92.2)  fL


 


MCH  21.6 L    (25.7-32.2)  pg


 


MCHC  28.1 L    (32.3-36.5)  g/dL


 


RDW  21.1 H    (11.6-14.4)  %


 


Plt Count  381 H    (163-337)  x10^3/uL


 


MPV  10.7    (9.4-12.4)  fL


 


Gran %  72.6 H    (34.0-67.9)  %


 


Immature Gran % (Auto)  0.3    (0.001-0.429)  %


 


Nucleat RBC Rel Count  0.0    (0.00-0.2)  %


 


Eos # (Auto)  0.35    (0.04-0.54)  x10^3/uL


 


Immature Gran # (Auto)  0.02    (0.001-0.031)  x10^3u/L


 


Absolute Lymphs (auto)  0.91 L    (1.32-3.57)  x10^3/uL


 


Absolute Monos (auto)  0.74    (0.30-0.82)  x10^3/uL


 


Absolute Nucleated RBC  0.00    (0.00-0.012)  x10^3u/L


 


Lymphocytes %  11.8 L    (21.8-53.1)  %


 


Monocytes %  9.6    (5.3-12.2)  %


 


Eosinophils %  4.5    (0.8-7.0)  %


 


Basophils %  1.2    (0.2-1.2)  %


 


Absolute Granulocytes  5.62 H    (1.78-5.38)  x10^3/uL


 


Basophils #  0.09 H    (0.01-0.08)  x10^3/uL


 


PT    10.8  (9.4-12.5)  SECONDS


 


INR    0.99  (0.8-3.0)  


 


APTT    60.6 H  (25.1-36.5)  SECONDS


 


Sodium   141   (135-145)  mmol/L


 


Potassium   3.8   (3.5-5.1)  mmol/L


 


Chloride   104   ()  mmol/L


 


Carbon Dioxide   26   (22-30)  mmol/L


 


Anion Gap   15.0   (5-15)  MEQ/L


 


BUN   13   (9-20)  mg/dL


 


Creatinine   1.26 H   (0.66-1.25)  mg/dL


 


Estimated GFR   58.0   ML/MIN


 


Glucose   233 H   ()  mg/dL


 


Calcium   9.3   (8.4-10.2)  mg/dL


 


Magnesium     (1.6-2.3)  mg/dL


 


Total Bilirubin   0.80   (0.2-1.3)  mg/dL


 


AST   34   (17-59)  U/L


 


ALT   23   (0-50)  U/L


 


Alkaline Phosphatase   77   ()  U/L


 


Troponin I     (0.000-0.033)  ng/mL


 


Serum Total Protein   6.8   (6.3-8.2)  g/dL


 


Albumin   3.8   (3.5-5.0)  g/dL


 


Urine Color     (Yellow)  


 


Urine Appearance     (Clear)  


 


Urine pH     (4.6-8.0)  


 


Ur Specific Gravity     (1.005-1.030)  


 


Urine Protein     (Negative)  


 


Urine Glucose (UA)     (Negative)  mg/dL


 


Urine Ketones     (Negative)  


 


Urine Blood     (Negative)  


 


Urine Nitrite     (Negative)  


 


Urine Bilirubin     (Negative)  


 


Urine Urobilinogen     (0.2)  mg/dL


 


Ur Leukocyte Esterase     (Negative)  


 


U Hyaline Cast (Auto)     (0-2)  /LPF


 


Urine Microscopic RBC     (0-5)  /HPF


 


Urine Microscopic WBC     (0-5)  /HPF


 


Ur Epithelial Cells     (None Seen)  /HPF


 


Urine Bacteria     (None Seen)  /HPF


 


Urine Culture Reflexed     (NO)  


 


Slides for Path Review  YES    














  10/08/24 10/08/24 10/08/24 Range/Units





  11:30 11:56 12:09 


 


WBC     (4.23-9.07)  x10^3/uL


 


RBC     (4.63-6.08)  x10^6/uL


 


Hgb     (13.7-17.5)  g/dL


 


Hct     (40.1-51.0)  %


 


MCV     (79.0-92.2)  fL


 


MCH     (25.7-32.2)  pg


 


MCHC     (32.3-36.5)  g/dL


 


RDW     (11.6-14.4)  %


 


Plt Count     (163-337)  x10^3/uL


 


MPV     (9.4-12.4)  fL


 


Gran %     (34.0-67.9)  %


 


Immature Gran % (Auto)     (0.001-0.429)  %


 


Nucleat RBC Rel Count     (0.00-0.2)  %


 


Eos # (Auto)     (0.04-0.54)  x10^3/uL


 


Immature Gran # (Auto)     (0.001-0.031)  x10^3u/L


 


Absolute Lymphs (auto)     (1.32-3.57)  x10^3/uL


 


Absolute Monos (auto)     (0.30-0.82)  x10^3/uL


 


Absolute Nucleated RBC     (0.00-0.012)  x10^3u/L


 


Lymphocytes %     (21.8-53.1)  %


 


Monocytes %     (5.3-12.2)  %


 


Eosinophils %     (0.8-7.0)  %


 


Basophils %     (0.2-1.2)  %


 


Absolute Granulocytes     (1.78-5.38)  x10^3/uL


 


Basophils #     (0.01-0.08)  x10^3/uL


 


PT     (9.4-12.5)  SECONDS


 


INR     (0.8-3.0)  


 


APTT     (25.1-36.5)  SECONDS


 


Sodium     (135-145)  mmol/L


 


Potassium     (3.5-5.1)  mmol/L


 


Chloride     ()  mmol/L


 


Carbon Dioxide     (22-30)  mmol/L


 


Anion Gap     (5-15)  MEQ/L


 


BUN     (9-20)  mg/dL


 


Creatinine     (0.66-1.25)  mg/dL


 


Estimated GFR     ML/MIN


 


Glucose     ()  mg/dL


 


Calcium     (8.4-10.2)  mg/dL


 


Magnesium   2.2   (1.6-2.3)  mg/dL


 


Total Bilirubin     (0.2-1.3)  mg/dL


 


AST     (17-59)  U/L


 


ALT     (0-50)  U/L


 


Alkaline Phosphatase     ()  U/L


 


Troponin I  < 0.012    (0.000-0.033)  ng/mL


 


Serum Total Protein     (6.3-8.2)  g/dL


 


Albumin     (3.5-5.0)  g/dL


 


Urine Color    Yellow  (Yellow)  


 


Urine Appearance    Clear  (Clear)  


 


Urine pH    6.0  (4.6-8.0)  


 


Ur Specific Gravity    >=1.030 A  (1.005-1.030)  


 


Urine Protein    Negative  (Negative)  


 


Urine Glucose (UA)    >=1000 A  (Negative)  mg/dL


 


Urine Ketones    Negative  (Negative)  


 


Urine Blood    Negative  (Negative)  


 


Urine Nitrite    Negative  (Negative)  


 


Urine Bilirubin    Negative  (Negative)  


 


Urine Urobilinogen    0.2  (0.2)  mg/dL


 


Ur Leukocyte Esterase    Negative  (Negative)  


 


U Hyaline Cast (Auto)    NONE SEEN  (0-2)  /LPF


 


Urine Microscopic RBC    0-2  (0-5)  /HPF


 


Urine Microscopic WBC    0-2  (0-5)  /HPF


 


Ur Epithelial Cells    None Seen  (None Seen)  /HPF


 


Urine Bacteria    None Seen  (None Seen)  /HPF


 


Urine Culture Reflexed    NO  (NO)  


 


Slides for Path Review     














- Radiology Impressions


Radiology Exams & Impressions: 


                              Radiology Procedures











 Category Date Time Status


 


 CERVICAL SPINE WITH CONTRAST [CT] Routine Exams  10/08/24 15:10 Ordered


 


 HEAD WITH CONTRAST [CT] Routine Exams  10/08/24 15:10 Ordered


 


 LUMBAR SPINE WITH [CT] Routine Exams  10/08/24 15:10 Ordered














- Other Procedures and Tests


                               Respiratory Therapy





10/08/24 14:36


RT Screen per Nursing Assess ONCE 














Assessment/Plan


(1) Generalized weakness


Current Visit: Yes   Status: Acute   


Assessment & Plan: 


- CT head, cervical, and lumbar spine with contrast


- PT eval and treat


- Consider rehab placement


Code(s): R53.1 - WEAKNESS   





(2) Peripheral neuropathy


Current Visit: Yes   Status: Acute   


Assessment & Plan: 


- Chronic


- A1C pending


- does not take any home meds for this


- B12, TSH, CRP, ESR, A1C pending


- Consider gabapentin





Code(s): G62.9 - POLYNEUROPATHY, UNSPECIFIED   





(3) Anemia


Current Visit: Yes   Status: Chronic   


Assessment & Plan: 


- Hgb 9.1- stable


- reviewed most recent irion panel labs


- needs OP colonoscopy


Code(s): D64.9 - ANEMIA, UNSPECIFIED   





(4) Type II diabetes mellitus


Current Visit: Yes   Status: Acute   


Assessment & Plan: 


- Lantus 30 units at HS


- S/S humalog


- A1C pending








(5) Hyperlipidemia


Current Visit: Yes   Status: Acute   


Assessment & Plan: 


- Continue statin


Code(s): E78.5 - HYPERLIPIDEMIA, UNSPECIFIED   





(6) Obesity (BMI 30-39.9)


Current Visit: No   Status: Chronic   


Assessment & Plan: 


- Advised ADA diet and exercise control


Code(s): E66.9 - OBESITY, UNSPECIFIED   





(7) Sleep apnea


Current Visit: Yes   Status: Chronic   


Assessment & Plan: 


- RT CPAP set up


Code(s): G47.30 - SLEEP APNEA, UNSPECIFIED   





(8) Restless legs


Current Visit: Yes   Status: Acute   


Assessment & Plan: 


- Start requip at HS








VTE:Plavix


PPI: Protonix


Next of KIN: daughter


D/C plan: 1-2 days


Code status: SCO








Telemedicine Encounter





- Telemedicine Encounter


Telemedicine Encounter: 


****"The entirety of this encounter was performed via Telemedicine"****





This visit was performed using real-time audio and video connection between my 

location and thepatients locationwith the assistance of a surrogateat the 

patients location. Written or verbal consent was obtained from the 

patient/guardian to perform this visit usingsynchrDctiotelemedicine 

technology. Any patient questions regarding the telemedicine interaction were 

answered.
abdominal pain

## 2024-10-09 VITALS
TEMPERATURE: 98.5 F | HEART RATE: 76 BPM | OXYGEN SATURATION: 92 % | RESPIRATION RATE: 20 BRPM | DIASTOLIC BLOOD PRESSURE: 62 MMHG | SYSTOLIC BLOOD PRESSURE: 141 MMHG

## 2024-10-09 LAB
ALBUMIN SERPL-MCNC: 3.9 G/DL (ref 3.5–5)
ALP SERPL-CCNC: 82 U/L (ref 38–126)
ALT SERPL-CCNC: 18 U/L (ref 0–50)
ANION GAP SERPL CALC-SCNC: 15 MEQ/L (ref 5–15)
AST SERPL QL: 33 U/L (ref 17–59)
BILIRUB BLD-MCNC: 0.7 MG/DL (ref 0.2–1.3)
BLD SMEAR INTERP: YES
BUN SERPL-MCNC: 12 MG/DL (ref 9–20)
CALCIUM SPEC-MCNC: 9.1 MG/DL (ref 8.4–10.2)
CHLORIDE SERPL-SCNC: 105 MMOL/L (ref 98–107)
CO2 SERPL-SCNC: 27 MMOL/L (ref 22–30)
CREAT SERPL-MCNC: 1.27 MG/DL (ref 0.66–1.25)
GFR SERPLBLD BASED ON 1.73 SQ M-ARVRAT: 57.5 ML/MIN
GLUCOSE SERPL-MCNC: 147 MG/DL (ref 74–106)
HCT VFR BLD AUTO: 31.3 % (ref 40.1–51)
HGB BLD-MCNC: 8.8 G/DL (ref 13.7–17.5)
MAGNESIUM SERPL-MCNC: 2.2 MG/DL (ref 1.6–2.3)
MCH RBC QN AUTO: 21.5 PG (ref 25.7–32.2)
MCHC RBC AUTO-ENTMCNC: 28.1 G/DL (ref 32.3–36.5)
PLATELET # BLD AUTO: 401 X10^3/UL (ref 163–337)
POTASSIUM SERPLBLD-SCNC: 3.6 MMOL/L (ref 3.5–5.1)
PROT SERPL-MCNC: 7.2 G/DL (ref 6.3–8.2)
RBC # BLD AUTO: 4.09 X10^6/UL (ref 4.63–6.08)
SODIUM SERPL-SCNC: 143 MMOL/L (ref 135–145)
WBC # BLD AUTO: 7.2 X10^3/UL (ref 4.23–9.07)

## 2024-10-09 RX ADMIN — CLOPIDOGREL BISULFATE SCH MG: 75 TABLET ORAL at 08:45

## 2024-10-09 RX ADMIN — LOSARTAN POTASSIUM SCH MG: 50 TABLET, FILM COATED ORAL at 08:46

## 2024-10-09 RX ADMIN — PANTOPRAZOLE SODIUM SCH MG: 40 TABLET, DELAYED RELEASE ORAL at 08:46

## 2024-10-09 RX ADMIN — ASPIRIN SCH MG: 81 TABLET, COATED ORAL at 08:46

## 2024-10-09 RX ADMIN — INSULIN LISPRO PRN UNIT: 100 INJECTION, SOLUTION INTRAVENOUS; SUBCUTANEOUS at 22:12

## 2024-10-09 RX ADMIN — THERA TABS SCH TAB: TAB at 08:45

## 2024-10-09 NOTE — PCM.NOTE
Date and Time: 10/09/24  1300





Subjective Assessment: 


10/8/24


 is a 79 year old male with PMHX of iron def. anemia, HTN, 

hyperlipidemia, PE, A-fib, DVT, Sleep apnea, Type II DM, peripheral neuropathy, 

OA, chronic obesity, and GERD. He presented o our ED via EMS today.  Patient was

brought in from home for evaluation of generalized weakness.  Patient reports 

that he had a blood transfusion on September 24.  Since then he has been 

experiencing numbness of his lower extremities and progressive weakness.  

Patient states the numbness has gotten progressively worse. He has a hx of 

peripheral neuropathy but was told by PCP his sxs are r/t his anemia and takes 

no meds for this. He states he has cramping of his leg BL from hips down at 

times. He denies loss of bowel or bladder. He reports chronic lumbar bacl pain 

with cramping. He  Patient reports that he can only ambulate 3 steps before 

needing to sit down. Walking makes his back pain worse. Patient denies obvious 

blood loss since last admission and Hgb 9 today. This is improved from last 

visit. He was to f/u OP for colonoscopy which he has not done yet.  Daughter at 

bedside and all questions and conerns answered. She reports pt has intermittent 

confusion. Labs done in ER overall non-concerning. CT head, cervical, and lumbar

spine ordered. Will consider neurology consult. More labs ordered for further 

eval of sxs. He denies CP, SOB, abd. pain, N/V/D. 





10/9/24


Pt resting in bed. He did feel that Requip was helpful for restless legs last 

night. However, he feels that the medication made him a bit sleepy this morning.

Reviewed CT results with the pt. Carotid duplex Bl ordered as calcifications 

seen on CT. Neurology consult ordered for drop foot BL, weakness, and numbness 

of BLLE. PT consult pending. Pt will likely need rehab at d/c. BOOM ordered for 

identification of possible PAD. Will start low dose Gabapentin at HS for 

peripheral neuropathy sxs. Concern for increased sedation since Requip made him 

sleepy. He denies CP, SOB, abd. pain, N/V/D. 














- Review of Systems


Constitutional: Weakness, No Fever, No Chills


Eyes: No Symptoms


Ears, Nose, & Throat: No Symptoms


Respiratory: No Cough, No Short Of Breath


Cardiac: No Chest Pain, No Edema, No Syncope


Abdominal/Gastrointestinal: No Abdominal Pain, No Nausea, No Vomiting, No 

Diarrhea


Genitourinary Symptoms: No Dysuria


Musculoskeletal: No Back Pain, No Neck Pain


Skin: No Rash


Neurological: Parasthesia (BLLE, with drop foot BL), No Dizziness, No Focal 

Weakness, No Sensory Changes


Psychological: No Symptoms


Endocrine: No Symptoms


Hematologic/Lymphatic: No Symptoms


Immunological/Allergic: No Symptoms





Objective Exam


General Appearance: no apparent distress, alert, obese


Neurologic Exam: alert, oriented x 3, cooperative, normal mood/affect, nml 

cerebellar function, motor weakness, other (BLLE drop foot), No motor deficits


Skin Exam: normal color, warm, dry


Eye Exam: PERRL, EOMI, eyes nml inspection


Ears, Nose, Throat Exam: normal ENT inspection, pharynx normal, moist mucous 

membranes


Neck Exam: normal inspection, non-tender, supple, full range of motion


Respiratory Exam: normal breath sounds, lungs clear, No respiratory distress


Cardiovascular Exam: regular rate/rhythm, normal heart sounds


Gastrointestinal/Abdomen Exam: soft, No tenderness, No mass


Extremity Exam: normal inspection, normal range of motion


Back Exam: normal inspection, normal range of motion, No CVA tenderness, No 

vertebral tenderness


Male Genitalia Exam: deferred


Rectal Exam: deferred





Objective Data


Vital Signs: 


                               Vital Signs - 24 hr











  Temp Pulse Resp BP BP Pulse Ox


 


 10/09/24 07:50  97.9 F  74  16   157/72  92 L


 


 10/09/24 06:48   74  16    92 L


 


 10/09/24 04:33       99


 


 10/09/24 04:00  97.6 F  65  22   124/58  94 L


 


 10/08/24 23:33  97.7 F  72  20   154/64  94 L


 


 10/08/24 20:00  97.3 F  69  22   129/62  96


 


 10/08/24 18:31   65  18    95


 


 10/08/24 17:24   68  18    96


 


 10/08/24 16:00  97.8 F  56 L  18   142/70  92 L


 


 10/08/24 15:20       96


 


 10/08/24 14:23  98.0 F  64  22   118/64  96


 


 10/08/24 14:02       96


 


 10/08/24 13:01   65  22  128/75   96








                        Pain Assessment - Last Documented











Pain Intensity                 0











Intake and Output: 


                                 Intake & Output











 10/07/24 10/08/24 10/09/24 10/10/24





 11:59 11:59 11:59 11:59


 


Intake Total   720 


 


Output Total   1200 


 


Balance   -480 


 


Weight  133 kg 132.6 kg 











Lab Results: 


                            Lab Results-Last 24 Hours











  10/08/24 10/08/24 10/08/24 Range/Units





  11:27 11:56 14:50 


 


WBC     (4.23-9.07)  x10^3/uL


 


RBC     (4.63-6.08)  x10^6/uL


 


Hgb     (13.7-17.5)  g/dL


 


Hct     (40.1-51.0)  %


 


MCV     (79.0-92.2)  fL


 


MCH     (25.7-32.2)  pg


 


MCHC     (32.3-36.5)  g/dL


 


RDW     (11.6-14.4)  %


 


Plt Count     (163-337)  x10^3/uL


 


MPV     (9.4-12.4)  fL


 


ESR     (0-15)  mm/hr


 


Sodium     (135-145)  mmol/L


 


Potassium     (3.5-5.1)  mmol/L


 


Chloride     ()  mmol/L


 


Carbon Dioxide     (22-30)  mmol/L


 


Anion Gap     (5-15)  MEQ/L


 


BUN     (9-20)  mg/dL


 


Creatinine     (0.66-1.25)  mg/dL


 


Estimated GFR     ML/MIN


 


Glucose     ()  mg/dL


 


POC Glucometer     (74 to 106)  mg/dL


 


Hemoglobin A1c     (4.5-6.0)  %


 


Calcium     (8.4-10.2)  mg/dL


 


Magnesium   2.2   (1.6-2.3)  mg/dL


 


Total Bilirubin     (0.2-1.3)  mg/dL


 


AST     (17-59)  U/L


 


ALT     (0-50)  U/L


 


Alkaline Phosphatase     ()  U/L


 


Troponin I    < 0.012  (0.000-0.033)  ng/mL


 


Serum Total Protein     (6.3-8.2)  g/dL


 


Albumin     (3.5-5.0)  g/dL


 


Vitamin B12     (239-931)  pg/mL


 


TSH 3rd Generation     (0.470-4.680)  mIU/L


 


Slides for Path Review  YES    














  10/08/24 10/08/24 10/08/24 Range/Units





  15:00 15:00 15:00 


 


WBC     (4.23-9.07)  x10^3/uL


 


RBC     (4.63-6.08)  x10^6/uL


 


Hgb     (13.7-17.5)  g/dL


 


Hct     (40.1-51.0)  %


 


MCV     (79.0-92.2)  fL


 


MCH     (25.7-32.2)  pg


 


MCHC     (32.3-36.5)  g/dL


 


RDW     (11.6-14.4)  %


 


Plt Count     (163-337)  x10^3/uL


 


MPV     (9.4-12.4)  fL


 


ESR     (0-15)  mm/hr


 


Sodium     (135-145)  mmol/L


 


Potassium     (3.5-5.1)  mmol/L


 


Chloride     ()  mmol/L


 


Carbon Dioxide     (22-30)  mmol/L


 


Anion Gap     (5-15)  MEQ/L


 


BUN     (9-20)  mg/dL


 


Creatinine     (0.66-1.25)  mg/dL


 


Estimated GFR     ML/MIN


 


Glucose     ()  mg/dL


 


POC Glucometer     (74 to 106)  mg/dL


 


Hemoglobin A1c   7.52 H   (4.5-6.0)  %


 


Calcium     (8.4-10.2)  mg/dL


 


Magnesium     (1.6-2.3)  mg/dL


 


Total Bilirubin     (0.2-1.3)  mg/dL


 


AST     (17-59)  U/L


 


ALT     (0-50)  U/L


 


Alkaline Phosphatase     ()  U/L


 


Troponin I     (0.000-0.033)  ng/mL


 


Serum Total Protein     (6.3-8.2)  g/dL


 


Albumin     (3.5-5.0)  g/dL


 


Vitamin B12  999 H    (239-931)  pg/mL


 


TSH 3rd Generation    2.274  (0.470-4.680)  mIU/L


 


Slides for Path Review     














  10/08/24 10/08/24 10/08/24 Range/Units





  17:25 18:21 18:21 


 


WBC     (4.23-9.07)  x10^3/uL


 


RBC     (4.63-6.08)  x10^6/uL


 


Hgb     (13.7-17.5)  g/dL


 


Hct     (40.1-51.0)  %


 


MCV     (79.0-92.2)  fL


 


MCH     (25.7-32.2)  pg


 


MCHC     (32.3-36.5)  g/dL


 


RDW     (11.6-14.4)  %


 


Plt Count     (163-337)  x10^3/uL


 


MPV     (9.4-12.4)  fL


 


ESR    66 H  (0-15)  mm/hr


 


Sodium     (135-145)  mmol/L


 


Potassium     (3.5-5.1)  mmol/L


 


Chloride     ()  mmol/L


 


Carbon Dioxide     (22-30)  mmol/L


 


Anion Gap     (5-15)  MEQ/L


 


BUN     (9-20)  mg/dL


 


Creatinine     (0.66-1.25)  mg/dL


 


Estimated GFR     ML/MIN


 


Glucose     ()  mg/dL


 


POC Glucometer  118 H    (74 to 106)  mg/dL


 


Hemoglobin A1c     (4.5-6.0)  %


 


Calcium     (8.4-10.2)  mg/dL


 


Magnesium     (1.6-2.3)  mg/dL


 


Total Bilirubin     (0.2-1.3)  mg/dL


 


AST     (17-59)  U/L


 


ALT     (0-50)  U/L


 


Alkaline Phosphatase     ()  U/L


 


Troponin I   < 0.012   (0.000-0.033)  ng/mL


 


Serum Total Protein     (6.3-8.2)  g/dL


 


Albumin     (3.5-5.0)  g/dL


 


Vitamin B12     (239-931)  pg/mL


 


TSH 3rd Generation     (0.470-4.680)  mIU/L


 


Slides for Path Review     














  10/08/24 10/09/24 10/09/24 Range/Units





  21:01 05:17 05:17 


 


WBC   7.2   (4.23-9.07)  x10^3/uL


 


RBC   4.09 L   (4.63-6.08)  x10^6/uL


 


Hgb   8.8 L   (13.7-17.5)  g/dL


 


Hct   31.3 L   (40.1-51.0)  %


 


MCV   76.5 L   (79.0-92.2)  fL


 


MCH   21.5 L   (25.7-32.2)  pg


 


MCHC   28.1 L   (32.3-36.5)  g/dL


 


RDW   21.5 H   (11.6-14.4)  %


 


Plt Count   401 H   (163-337)  x10^3/uL


 


MPV   10.8   (9.4-12.4)  fL


 


ESR     (0-15)  mm/hr


 


Sodium    143  (135-145)  mmol/L


 


Potassium    3.6  (3.5-5.1)  mmol/L


 


Chloride    105  ()  mmol/L


 


Carbon Dioxide    27  (22-30)  mmol/L


 


Anion Gap    15.0  (5-15)  MEQ/L


 


BUN    12  (9-20)  mg/dL


 


Creatinine    1.27 H  (0.66-1.25)  mg/dL


 


Estimated GFR    57.5  ML/MIN


 


Glucose    147 H  ()  mg/dL


 


POC Glucometer  194 H    (74 to 106)  mg/dL


 


Hemoglobin A1c     (4.5-6.0)  %


 


Calcium    9.1  (8.4-10.2)  mg/dL


 


Magnesium    2.2  (1.6-2.3)  mg/dL


 


Total Bilirubin    0.70  (0.2-1.3)  mg/dL


 


AST    33  (17-59)  U/L


 


ALT    18  (0-50)  U/L


 


Alkaline Phosphatase    82  ()  U/L


 


Troponin I     (0.000-0.033)  ng/mL


 


Serum Total Protein    7.2  (6.3-8.2)  g/dL


 


Albumin    3.9  (3.5-5.0)  g/dL


 


Vitamin B12     (239-931)  pg/mL


 


TSH 3rd Generation     (0.470-4.680)  mIU/L


 


Slides for Path Review   YES   














  10/09/24 10/09/24 Range/Units





  07:02 12:01 


 


WBC    (4.23-9.07)  x10^3/uL


 


RBC    (4.63-6.08)  x10^6/uL


 


Hgb    (13.7-17.5)  g/dL


 


Hct    (40.1-51.0)  %


 


MCV    (79.0-92.2)  fL


 


MCH    (25.7-32.2)  pg


 


MCHC    (32.3-36.5)  g/dL


 


RDW    (11.6-14.4)  %


 


Plt Count    (163-337)  x10^3/uL


 


MPV    (9.4-12.4)  fL


 


ESR    (0-15)  mm/hr


 


Sodium    (135-145)  mmol/L


 


Potassium    (3.5-5.1)  mmol/L


 


Chloride    ()  mmol/L


 


Carbon Dioxide    (22-30)  mmol/L


 


Anion Gap    (5-15)  MEQ/L


 


BUN    (9-20)  mg/dL


 


Creatinine    (0.66-1.25)  mg/dL


 


Estimated GFR    ML/MIN


 


Glucose    ()  mg/dL


 


POC Glucometer  148 H  232 H  (74 to 106)  mg/dL


 


Hemoglobin A1c    (4.5-6.0)  %


 


Calcium    (8.4-10.2)  mg/dL


 


Magnesium    (1.6-2.3)  mg/dL


 


Total Bilirubin    (0.2-1.3)  mg/dL


 


AST    (17-59)  U/L


 


ALT    (0-50)  U/L


 


Alkaline Phosphatase    ()  U/L


 


Troponin I    (0.000-0.033)  ng/mL


 


Serum Total Protein    (6.3-8.2)  g/dL


 


Albumin    (3.5-5.0)  g/dL


 


Vitamin B12    (239-931)  pg/mL


 


TSH 3rd Generation    (0.470-4.680)  mIU/L


 


Slides for Path Review    











Radiology Exams: 


                              Radiology Procedures











 Category Date Time Status


 


 BOOM/LIMB PRESSURES BILATERAL [US] Routine Exams  10/09/24 11:10 Completed


 


 CAROTID BILATERAL [US] Routine Exams  10/09/24 08:18 Completed


 


 CERVICAL SPINE WITH CONTRAST [CT] Routine Exams  10/08/24 15:10 Completed


 


 HEAD W/WO CONTRAST [CT] Routine Exams  10/08/24 15:10 Completed


 


 LUMBAR SPINE WITH [CT] Routine Exams  10/08/24 15:10 Completed











Multi-Disciplinary Progress Notes: 


                        Multi-Disciplinary Progress Notes





10/09/24 12:58 Case Management Note by Haylie Graves COMPLETE- NO LEVEL II REQUIRED. COPIES PLACE ON CHART





Initialized on 10/09/24 12:58 - END OF NOTE

















Assessment/Plan


(1) Generalized weakness


Current Visit: Yes   Status: Acute   Code(s): R53.1 - WEAKNESS   





(2) Peripheral neuropathy


Current Visit: Yes   Status: Acute   Code(s): G62.9 - POLYNEUROPATHY, 

UNSPECIFIED   





(3) Anemia


Current Visit: Yes   Status: Chronic   Code(s): D64.9 - ANEMIA, UNSPECIFIED   





(4) Type II diabetes mellitus


Current Visit: Yes   Status: Acute   





(5) Hyperlipidemia


Current Visit: Yes   Status: Acute   Code(s): E78.5 - HYPERLIPIDEMIA, UN

SPECIFIED   





(6) Obesity (BMI 30-39.9)


Current Visit: No   Status: Chronic   Code(s): E66.9 - OBESITY, UNSPECIFIED   





(7) Sleep apnea


Current Visit: Yes   Status: Chronic   Code(s): G47.30 - SLEEP APNEA, 

UNSPECIFIED   





(8) Restless legs


Current Visit: Yes   Status: Acute   


Assessment & Plan: 


(1) Generalized weakness


Current Visit: Yes   Status: Acute   


Assessment & Plan: 


- CT head, cervical, and lumbar spine with contrast- reviewed


- PT eval and treat


- Consider rehab placement


10/9


- neurology consult


- BL drop foot


Code(s): R53.1 - WEAKNESS   





(2) Peripheral neuropathy


Current Visit: Yes   Status: Acute   


Assessment & Plan: 


- Chronic


- A1C 


- does not take any home meds for this


- B12, TSH, CRP, ESR, A1C 


- Consider gabapentin


10/9


- start gabapentin at HS


- neurology consult


- BOOM' s BL normal 


Code(s): G62.9 - POLYNEUROPATHY, UNSPECIFIED   





(3) Anemia


Current Visit: Yes   Status: Chronic   


Assessment & Plan: 


- Hgb 9.1- stable


- reviewed most recent irion panel labs


- needs OP colonoscopy


10/9


- Hgb 8.8


Code(s): D64.9 - ANEMIA, UNSPECIFIED   





(4) Type II diabetes mellitus


Current Visit: Yes   Status: Acute   


Assessment & Plan: 


- Lantus 30 units at HS


- S/S humalog


- A1C 7.52- controlled








(5) Hyperlipidemia


Current Visit: Yes   Status: Acute   


Assessment & Plan: 


- Continue statin


Code(s): E78.5 - HYPERLIPIDEMIA, UNSPECIFIED   





(6) Obesity (BMI 30-39.9)


Current Visit: No   Status: Chronic   


Assessment & Plan: 


- Advised ADA diet and exercise control


Code(s): E66.9 - OBESITY, UNSPECIFIED   





(7) Sleep apnea


Current Visit: Yes   Status: Chronic   


Assessment & Plan: 


- RT CPAP set up


Code(s): G47.30 - SLEEP APNEA, UNSPECIFIED   





(8) Restless legs


Current Visit: Yes   Status: Acute   


Assessment & Plan: 


- Requip at HS

















(9) Intermittent confusion


Current Visit: Yes   Status: Acute   


Assessment & Plan: 


- per daughter is new. 


- carotid duplex reviewed








VTE:Plavix


PPI: Protonix


Next of KIN: daughter


D/C plan: 1-2 days


Code status: SCO


Code(s): R41.0 - DISORIENTATION, UNSPECIFIED

## 2024-10-09 NOTE — PCM.NOTE
Date and Time: 10/09/24  1712





[facility] Consult





**Physician Signature**


This document was electronically signed by: Moses Cooley DO  





**Consult Cover Page**


FROM: [facility], [phone]


Call Back Number: [phone]


SUBJECT: Consult Recommendations


Date and Time of Report: 10/09/2024 05:05 PM ET


Items Contained in this Document: Neurology Consult Note





**Consult Information**


Member Facility: [facility]


Facility MRN: [id number]


Consult ID: [id number]


Facility Time Zone: ET


Date and Time of Request: 10- 12:11 PM ET


Requesting Clinician: Dr. Hunt


Patient Name: Kennedy Boland


YOB: 1945


Gender: Male


Patient identity was confirmed at the beginning of the consult with the 

patient/family/staff using two personal identifiers: Patient name and 





**Reason for Consult**


Reason for Consult: Inpatient





**General**


Chief Complaint: per intake.... "Foot drop, weakness/numbness BLLE"


Patient Location and Admission Status: Inpatient


Family Members and Medical Staff Present During Exam: patient's nurse,


History of Present Illness: I entered the patients room by way of secure two-way

audiovisual conferencing.  Patient name and date of birth confirmed with patient

and nursing staff at bedside, patient agreeable to proceed with encounter.





Patient is in the hospital for further evaluation of generalized 

weakness/weakness in the lower extremities. Patient starts by telling me his 

symptoms are associated with "neuropathy" which started a month or so ago. He 

does state that about a month ago he had a cough, then started developing some 

numbness in his feet. He ended up going to his PCP told his cough is associated 

with anemia. At some point it sounds like he was given a transfusion. Patient 

tells me symptoms started in his feet, then about a week later up to his knees 

and then a little later up to his thighs and he does report sensory impairment 

and weakness from the "beltline" down. Does not clearly report new change in 

bowel or bladder function. Does have some low back pain and states when sitting 

for a while he will get sharp pains in the bottom of his feet. Otherwise no corey

r provoking or relieving factors. With ambulation he may be able to take a 

couple steps and then feels like his legs are going to buckle. At the time of 

this encounter patient had difficulty standing even with assistance. Patient 

denies associated speech/language/cognitive impairment, new visual changes, no 

new swallowing or breathing issues, denies new symptoms in his upper extremities

or neck. No clear report of sensory disturbance around the chest, again reports 

symptoms from the lower extremities up to the "beltline".





Patient does have multiple comorbidities, so far no major abnormalities with 

labs, CT of the head, neck or lumbar spine.  These studies do chronic appearing 

changes without obvious acute appearing pathology that could explain patients 

presenting, progressive symptoms.


Number of Documented HPI Elements: 4+





**Medical History**


Medical History: Coronary Artery Disease, Congestive Heart Failure, Diabetes 

Mellitus, Hyperlipidemia, Hypertension, Neuropathy, Obesity


Other Medical History: -- bilateral pulmonary emboli


-- syncope and collapse


-- pancreatitis, gastroenteritis, paralytic ileus


-- symptomatic anemia


-- generalized weakness


--sleep apnea


-- intermittent confusion


-- elevated serum creatinine


-- micro Citic anemia


Other Past Procedures: --watchmen procedure May 2024


--pacemaker


--cholecystectomy, orthopedic surgery dental surgeries,


Pertinent Family History: Unknown





**Allergies**


Allergies: Codeine, Sulfa


Other Allergies: cephalexin





**Medications**


Other Anti-Coagulants: reports use of Eliquis which was discontinued after 

watchmen procedure may 2024


Anti-Platelets: ASA 81 mg, Plavix (Clopidogrel)


Other Medications: -- home medications including rosuvastatin 10 mg daily, 

metoprolol, losartan, amlodipine, insulin, farxiga, tangelo sin, director team, 

omeprazole, supplements, vitamins,





--- Inpatient medications including blood pressure related medications, insulin,

gabapentin, paroxetine, pantoprazole,  Zocor 20 mg, ropinirole  0.5 mg nightly, 

aspirin and Plavix.





**Social History**


Alcohol Use: None


Drug Use: None


Tobacco Use: Past





**Vital Signs**


Temperature F: 97.9


Temperature C: 36.6


Blood Pressure (mmHg): 157/72


Heart Rate (bpm): 74


Date and Time: 10/09/2024 04:40:33 PM  ET


POC Glucose(mg/dL): 147





**Review Of Systems**


Neurological: See HPI


Cardiovascular: Pertinent Positives/Negatives


Cardiovascular Comments: no active chest pain


Respiratory: Pertinent Positives/Negatives


Respiratory comments: no active shortness of breath


Musculoskeletal: See HPI





**Exam**


Exam: Awake, alert and oriented, pleasant without acute distress. Speech is 

fluent, no aphasia. Attends to both sides of the environment equally, peripheral

visual fields appear grossly preserved, EOMI, facial expressions symmetric (mild

dec left lower at rest?) and speech is clear.  No drift/clumsiness of the upper 

extremities. Patient had significant difficulties raising from a chair even with

assistance.    Detailed sensory exam, deep tendon reflexes limited by way of 

tele-visit. Abnormal spontaneous movements not observed.


Clinician assisting with exam: patients nurses





**Labs and Imaging**


Labs available?: Yes


INR: 0.99


Blood Glucose (mg/dL): 147


WBC (mcL): 7


HGB (g/dL): 8


HCT (%): 31


PLT (mcL): 401


Cr (mg/dL): 1.27


Other Labs: --hemoglobin A1c 7.5


--B12 999


--TSH normal


CT Brain Findings: No acute changes, Atrophy, Chronic microvascular disease


Other Imaging Studies/Findings: --CT cervical spine


Impression:


1.  Osteopenia, multilevel degenerative changes greatest at C6-T1, and


bilateral carotid calcifications.


2.  Remaining CT cervical spine with contrast exam is negative.





--carotid ultrasound


Impression: Grossly stable minimal arteriosclerotic plaquing bilaterally.


Velocity measurements again negative for hemodynamically significant


flow-limiting stenosis.





--CT lumbar spine


Impression:


1.  Stable osteopenia, multilevel degenerative spondylosis, dextroscoliosis,


L1 Schmorl node, arteriosclerotic disease, right renal micro-calculus, and


left renal cyst.


2.  Remaining CT lumbar spine with contrast exam is negative.





**Assessment and Recommendations**


Assessment: Acute onset?... subacute progression of lower extremity 

weakness/sensory changes... starting in bilateral feet after, around the time of

the "cough" ~ a month ago (pt states he told a/w anemia).... symptoms seem to 

slowly progress up the lower extremities, now up to the "beltline".   No clear 

newly associated symptoms above the belt line...specifically no new troubles 

breathing or swallowing. Patient does report pain in the low back as well as 

sharp pains going to bilateral feet when sitting for extended period of time.  

No new b/b issues reported.  Now only able to barely walk a couple steps before 

legs giving way.   Lumbar spine CT not showing significant pathology that would 

clearly explain all of the symptoms.





DDx  Includes association with lumbar/lower thoracic spine disease?,   Guillain-

Barr type syndrome?


Recommendations: As discussed with María Macdonald NP, for now from 

neurology perspective,





--given progression of patient symptoms as outlined and ddx  considered patient 

should have lumbar puncture to evaluate for inflammatory process that may or may

not support a Guillain-Barr type syndrome.


-- should have MRI lumbar/t spine however it was just learned that patient 

reports having a pacemaker, unclear if compatible with MRI, will need to be 

further investigated


-- LP capability not available at this facility, patient to be transferred to 

higher level of care for further evaluation of his progressive symptoms, 

ambulatory dysfunction.





Thank you for the opportunity to be involved in the care and management of this 

patient.  Neurological recommendations provided. Please do not hesitate to call 

back with any new developments/information, data updates or questions.  At your 

request video follow up evaluation can be completed / arranged, please have 

video cart at bedside if requested.





Moses Cooley Jr, DO, MS


Adult Neurologist


[facility]


Disposition: Transfer patient to higher level of care





**Diagnosis**


Impression: Other


Diagnosis Other: progressive lower extremity weakness, paresthesias, ambulatory 

dysfunction


Case discussed with: María Macdonald NP





**ICD-10 Code**


ICD-10 Code (Primary): G83.11 : Monoplegia of lower limb affecting right 

dominant side


ICD-10 Code: G83.14 : Monoplegia of lower limb affecting left nondominant side


ICD-10 Code: R20.2 : Paresthesia of skin


ICD-10 Code: R26.2 : Difficulty in walking, not elsewhere classified


ICD-10 Code: R29.90 : Unspecified symptoms and signs involving the nervous 

system





**Attestation**


Interaction Mode: Video & Phone


Time of Phone Call : 10- 04:23 PM  ET


Time of Video Call : 10- 04:02 PM  ET


Interaction Attestation: Clinical telemedicine services delivered using HIPAA-

compliant interactive video-audio telecommunications while the patient and the 

rendering provider were not in the same physical location. Written report was 

provided to the requesting provider.


Evaluation Duration (mins): 89





**Key Timer Summary**


Date and Time of Request: 10- 12:11 PM  ET





**Physician Signature**


This document was electronically signed by: Moses Cooley DO  











Objective Exam





- Vital Signs


Vital Signs: 





                               Vital Signs - 24 hr











  10/08/24 10/08/24 10/08/24





  17:24 18:31 20:00


 


Temperature   97.3 F


 


Pulse Rate 68 65 69


 


Respiratory 18 18 22





Rate   


 


Blood Pressure   129/62





[Right Arm]   


 


O2 Sat by Pulse 96 95 96





Oximetry   














  10/08/24 10/09/24 10/09/24





  23:33 04:00 04:33


 


Temperature 97.7 F 97.6 F 


 


Pulse Rate 72 65 


 


Respiratory 20 22 





Rate   


 


Blood Pressure 154/64 124/58 





[Right Arm]   


 


O2 Sat by Pulse 94 L 94 L 99





Oximetry   














  10/09/24 10/09/24 10/09/24





  06:48 07:50 16:00


 


Temperature  97.9 F 97.4 F


 


Pulse Rate 74 74 69


 


Respiratory 16 16 18





Rate   


 


Blood Pressure  157/72 110/58





[Right Arm]   


 


O2 Sat by Pulse 92 L 92 L 92 L





Oximetry   














Objective Data





- Labs


Lab/Micro Results: 





                            Lab Results-Last 24 Hours











  10/08/24 10/08/24 10/08/24 Range/Units





  17:25 18:21 18:21 


 


WBC     (4.23-9.07)  x10^3/uL


 


RBC     (4.63-6.08)  x10^6/uL


 


Hgb     (13.7-17.5)  g/dL


 


Hct     (40.1-51.0)  %


 


MCV     (79.0-92.2)  fL


 


MCH     (25.7-32.2)  pg


 


MCHC     (32.3-36.5)  g/dL


 


RDW     (11.6-14.4)  %


 


Plt Count     (163-337)  x10^3/uL


 


MPV     (9.4-12.4)  fL


 


ESR    66 H  (0-15)  mm/hr


 


Sodium     (135-145)  mmol/L


 


Potassium     (3.5-5.1)  mmol/L


 


Chloride     ()  mmol/L


 


Carbon Dioxide     (22-30)  mmol/L


 


Anion Gap     (5-15)  MEQ/L


 


BUN     (9-20)  mg/dL


 


Creatinine     (0.66-1.25)  mg/dL


 


Estimated GFR     ML/MIN


 


Glucose     ()  mg/dL


 


POC Glucometer  118 H    (74 to 106)  mg/dL


 


Calcium     (8.4-10.2)  mg/dL


 


Magnesium     (1.6-2.3)  mg/dL


 


Total Bilirubin     (0.2-1.3)  mg/dL


 


AST     (17-59)  U/L


 


ALT     (0-50)  U/L


 


Alkaline Phosphatase     ()  U/L


 


Troponin I   < 0.012   (0.000-0.033)  ng/mL


 


Serum Total Protein     (6.3-8.2)  g/dL


 


Albumin     (3.5-5.0)  g/dL


 


Slides for Path Review     














  10/08/24 10/09/24 10/09/24 Range/Units





  21:01 05:17 05:17 


 


WBC   7.2   (4.23-9.07)  x10^3/uL


 


RBC   4.09 L   (4.63-6.08)  x10^6/uL


 


Hgb   8.8 L   (13.7-17.5)  g/dL


 


Hct   31.3 L   (40.1-51.0)  %


 


MCV   76.5 L   (79.0-92.2)  fL


 


MCH   21.5 L   (25.7-32.2)  pg


 


MCHC   28.1 L   (32.3-36.5)  g/dL


 


RDW   21.5 H   (11.6-14.4)  %


 


Plt Count   401 H   (163-337)  x10^3/uL


 


MPV   10.8   (9.4-12.4)  fL


 


ESR     (0-15)  mm/hr


 


Sodium    143  (135-145)  mmol/L


 


Potassium    3.6  (3.5-5.1)  mmol/L


 


Chloride    105  ()  mmol/L


 


Carbon Dioxide    27  (22-30)  mmol/L


 


Anion Gap    15.0  (5-15)  MEQ/L


 


BUN    12  (9-20)  mg/dL


 


Creatinine    1.27 H  (0.66-1.25)  mg/dL


 


Estimated GFR    57.5  ML/MIN


 


Glucose    147 H  ()  mg/dL


 


POC Glucometer  194 H    (74 to 106)  mg/dL


 


Calcium    9.1  (8.4-10.2)  mg/dL


 


Magnesium    2.2  (1.6-2.3)  mg/dL


 


Total Bilirubin    0.70  (0.2-1.3)  mg/dL


 


AST    33  (17-59)  U/L


 


ALT    18  (0-50)  U/L


 


Alkaline Phosphatase    82  ()  U/L


 


Troponin I     (0.000-0.033)  ng/mL


 


Serum Total Protein    7.2  (6.3-8.2)  g/dL


 


Albumin    3.9  (3.5-5.0)  g/dL


 


Slides for Path Review   YES   














  10/09/24 10/09/24 10/09/24 Range/Units





  07:02 12:01 16:14 


 


WBC     (4.23-9.07)  x10^3/uL


 


RBC     (4.63-6.08)  x10^6/uL


 


Hgb     (13.7-17.5)  g/dL


 


Hct     (40.1-51.0)  %


 


MCV     (79.0-92.2)  fL


 


MCH     (25.7-32.2)  pg


 


MCHC     (32.3-36.5)  g/dL


 


RDW     (11.6-14.4)  %


 


Plt Count     (163-337)  x10^3/uL


 


MPV     (9.4-12.4)  fL


 


ESR     (0-15)  mm/hr


 


Sodium     (135-145)  mmol/L


 


Potassium     (3.5-5.1)  mmol/L


 


Chloride     ()  mmol/L


 


Carbon Dioxide     (22-30)  mmol/L


 


Anion Gap     (5-15)  MEQ/L


 


BUN     (9-20)  mg/dL


 


Creatinine     (0.66-1.25)  mg/dL


 


Estimated GFR     ML/MIN


 


Glucose     ()  mg/dL


 


POC Glucometer  148 H  232 H  127 H  (74 to 106)  mg/dL


 


Calcium     (8.4-10.2)  mg/dL


 


Magnesium     (1.6-2.3)  mg/dL


 


Total Bilirubin     (0.2-1.3)  mg/dL


 


AST     (17-59)  U/L


 


ALT     (0-50)  U/L


 


Alkaline Phosphatase     ()  U/L


 


Troponin I     (0.000-0.033)  ng/mL


 


Serum Total Protein     (6.3-8.2)  g/dL


 


Albumin     (3.5-5.0)  g/dL


 


Slides for Path Review     








                                   Accuchecks











Date                           10/09/24


 


Date                           10/09/24


 


Date                           10/09/24

















- Other Procedures & Test


Other Procedures & Test: 





                               Respiratory Therapy





10/08/24 17:20


Oxygen Nasal Cannula 2 lpm 





10/09/24 07:00


Respiratory Therapy Assessment DAILY 














- Radiology Orders


Radiology Orders: 





                              Radiology Procedures











 Category Date Time Status


 


 BOOM/LIMB PRESSURES BILATERAL [US] Routine Exams  10/09/24 11:10 Completed


 


 CAROTID BILATERAL [US] Routine Exams  10/09/24 08:18 Completed


 


 CERVICAL SPINE WITH CONTRAST [CT] Routine Exams  10/08/24 15:10 Completed


 


 HEAD W/WO CONTRAST [CT] Routine Exams  10/08/24 15:10 Completed


 


 LUMBAR SPINE WITH [CT] Routine Exams  10/08/24 15:10 Completed














Assessment & Plan





- Encounter


Encounter: 


"The entirety of this encounter was performed via Telemedicine using audio and 

visual "

## 2024-10-09 NOTE — XRAY
Indication: Intermittent confusion.



Two-dimensional sonogram and color Doppler imaging carotid arteries of the

neck performed.



Comparison: June 19, 2019



Examination right carotid circulation again demonstrates intimal thickening at

the level of the bulb.  Otherwise widely patent common carotid, carotid bulb,

internal carotid, external carotid arteries.  PSV CCA is 106 cm/s.  PSV ICA is

89 cm/s.  ICA/CCA ratio is 0.8.  Normal antegrade vertebral artery flow.



Examination left carotid circulation again demonstrates minimal calcified

plaque at the level of the bulb extending into the origin of the internal

carotid artery.  Widely patent common carotid and external carotid artery.

PSV CCA is 87 cm/s.  PSV ICA is 114 cm/s.  ICA/CCA ratio is 1.3.  Normal

antegrade vertebral artery flow.



Impression: Grossly stable minimal arteriosclerotic plaquing bilaterally.

Velocity measurements again negative for hemodynamically significant

flow-limiting stenosis.

## 2024-10-09 NOTE — XRAY
Indication: Bilateral leg numbness.



Bilateral ankle brachial index exam performed.



Comparison: None



Right arm brachial pressure is 118.  Right ankle pressure is 147.  Ankle

brachial index is 1.0, normal.



Left arm brachial pressure is 142.  Left ankle pressure is 152.  Ankle

brachial is 1.1, normal.



Impression: Left and right ABIs are normal.